# Patient Record
Sex: MALE | Race: WHITE | NOT HISPANIC OR LATINO | Employment: OTHER | URBAN - METROPOLITAN AREA
[De-identification: names, ages, dates, MRNs, and addresses within clinical notes are randomized per-mention and may not be internally consistent; named-entity substitution may affect disease eponyms.]

---

## 2017-01-04 ENCOUNTER — HOSPITAL ENCOUNTER (EMERGENCY)
Facility: HOSPITAL | Age: 82
Discharge: HOME | End: 2017-01-05
Attending: EMERGENCY MEDICINE | Admitting: EMERGENCY MEDICINE

## 2017-01-04 DIAGNOSIS — N39.0 ACUTE UTI: ICD-10-CM

## 2017-01-04 DIAGNOSIS — S22.080A T12 COMPRESSION FRACTURE, INITIAL ENCOUNTER (HCC): Primary | ICD-10-CM

## 2017-01-04 LAB
BACTERIA UR QL AUTO: ABNORMAL /HPF
BILIRUB UR QL STRIP: NEGATIVE
CLARITY UR: ABNORMAL
COLOR UR: YELLOW
GLUCOSE UR STRIP-MCNC: ABNORMAL MG/DL
HGB UR QL STRIP.AUTO: ABNORMAL
HYALINE CASTS UR QL AUTO: ABNORMAL /LPF
KETONES UR QL STRIP: NEGATIVE
LEUKOCYTE ESTERASE UR QL STRIP.AUTO: NEGATIVE
NITRITE UR QL STRIP: NEGATIVE
PH UR STRIP.AUTO: 6 [PH] (ref 4.5–8)
PROT UR QL STRIP: ABNORMAL
RBC # UR: ABNORMAL /HPF
REF LAB TEST METHOD: ABNORMAL
SP GR UR STRIP: 1.01 (ref 1–1.03)
SQUAMOUS #/AREA URNS HPF: ABNORMAL /HPF
UROBILINOGEN UR QL STRIP: ABNORMAL
WBC UR QL AUTO: ABNORMAL /HPF

## 2017-01-04 PROCEDURE — 99284 EMERGENCY DEPT VISIT MOD MDM: CPT | Performed by: EMERGENCY MEDICINE

## 2017-01-04 PROCEDURE — 99284 EMERGENCY DEPT VISIT MOD MDM: CPT

## 2017-01-04 PROCEDURE — 81001 URINALYSIS AUTO W/SCOPE: CPT | Performed by: EMERGENCY MEDICINE

## 2017-01-04 PROCEDURE — 87086 URINE CULTURE/COLONY COUNT: CPT | Performed by: EMERGENCY MEDICINE

## 2017-01-04 RX ORDER — BACLOFEN 10 MG/1
10 TABLET ORAL 3 TIMES DAILY
COMMUNITY

## 2017-01-04 RX ORDER — IPRATROPIUM BROMIDE AND ALBUTEROL SULFATE 2.5; .5 MG/3ML; MG/3ML
3 SOLUTION RESPIRATORY (INHALATION) EVERY 4 HOURS PRN
COMMUNITY
End: 2017-02-10 | Stop reason: HOSPADM

## 2017-01-04 RX ORDER — BUMETANIDE 1 MG/1
1 TABLET ORAL 2 TIMES DAILY
COMMUNITY

## 2017-01-04 RX ORDER — ISOSORBIDE DINITRATE 10 MG/1
10 TABLET ORAL 3 TIMES DAILY
COMMUNITY

## 2017-01-04 RX ORDER — POTASSIUM CHLORIDE 20MEQ/15ML
20 LIQUID (ML) ORAL DAILY
COMMUNITY

## 2017-01-04 RX ORDER — FERROUS SULFATE 220 (44)/5
325.6 ELIXIR ORAL DAILY
COMMUNITY

## 2017-01-04 RX ORDER — FAMOTIDINE 20 MG/1
20 TABLET, FILM COATED ORAL 2 TIMES DAILY
COMMUNITY

## 2017-01-04 RX ORDER — MONTELUKAST SODIUM 10 MG/1
10 TABLET ORAL NIGHTLY
COMMUNITY
End: 2017-01-26 | Stop reason: HOSPADM

## 2017-01-04 RX ORDER — SENNOSIDES 8.8 MG/5ML
5 LIQUID ORAL NIGHTLY
COMMUNITY

## 2017-01-04 RX ORDER — FLUTICASONE PROPIONATE 50 MCG
2 SPRAY, SUSPENSION (ML) NASAL DAILY
COMMUNITY

## 2017-01-04 RX ORDER — SODIUM CHLORIDE 1000 MG
2 TABLET, SOLUBLE MISCELLANEOUS 2 TIMES DAILY
COMMUNITY
End: 2017-01-26 | Stop reason: HOSPADM

## 2017-01-04 RX ORDER — SIMVASTATIN 10 MG
10 TABLET ORAL NIGHTLY
COMMUNITY
End: 2017-02-10 | Stop reason: HOSPADM

## 2017-01-04 RX ORDER — ERGOCALCIFEROL 1.25 MG/1
50000 CAPSULE ORAL
COMMUNITY

## 2017-01-04 RX ORDER — BUDESONIDE 0.5 MG/2ML
0.5 INHALANT ORAL 2 TIMES DAILY
COMMUNITY

## 2017-01-05 ENCOUNTER — APPOINTMENT (OUTPATIENT)
Dept: CT IMAGING | Facility: HOSPITAL | Age: 82
End: 2017-01-05

## 2017-01-05 VITALS
RESPIRATION RATE: 20 BRPM | WEIGHT: 171 LBS | HEIGHT: 73 IN | HEART RATE: 96 BPM | SYSTOLIC BLOOD PRESSURE: 142 MMHG | DIASTOLIC BLOOD PRESSURE: 83 MMHG | OXYGEN SATURATION: 93 % | TEMPERATURE: 97.9 F | BODY MASS INDEX: 22.66 KG/M2

## 2017-01-05 PROCEDURE — 74176 CT ABD & PELVIS W/O CONTRAST: CPT

## 2017-01-05 RX ORDER — CEFUROXIME AXETIL 250 MG/1
250 TABLET ORAL ONCE
Status: COMPLETED | OUTPATIENT
Start: 2017-01-05 | End: 2017-01-05

## 2017-01-05 RX ORDER — CEFUROXIME AXETIL 250 MG/1
250 TABLET ORAL 2 TIMES DAILY
Qty: 10 TABLET | Refills: 0 | Status: SHIPPED | OUTPATIENT
Start: 2017-01-05 | End: 2017-01-26 | Stop reason: HOSPADM

## 2017-01-05 RX ADMIN — CEFUROXIME AXETIL 250 MG: 250 TABLET ORAL at 01:39

## 2017-01-05 NOTE — ED NOTES
Reva from [facility] called. Patient fell 4 days ago and was complaining of flank pain. He had an xray this morning which showed a compression fracture of T-12. They were unsure if it was new or old and wanted patient evaluated. Also reported incontinence x 4 days     Gill Rai RN  01/04/17 2054

## 2017-01-05 NOTE — DISCHARGE INSTRUCTIONS
Follow-up with Dr. Amos this week for the urinary tract infection and evaluation for pain control for the vertebral fracture.  Follow-up with Dr. Alicia within one week for the vertebral fracture.

## 2017-01-05 NOTE — ED PROVIDER NOTES
Subjective   History of Present Illness  History of Present Illness    Chief complaint: Fall    Location: Nursing home    Quality/Severity:  Back pain, unable to quantify the level of pain    Timing/Onset/Duration: Acute onset 4 days ago    Modifying Factors: It hurts to move, feels better to remain still    Associated Symptoms: There's been no change in bladder or bowel function    Narrative: This 81-year-old white male fell 4 days ago and had an x-ray done to nursing home today that showed a T12 compression fracture of unknown chronicity.  The patiehnt was seen here for evaluation for this.  The patient apparently has also been complaining of left flank pain.  The patient has no other complaints.    PCP:  Bette      Review of Systems   Reason unable to perform ROS: Patient nonverbal.        Medication List      START taking these medications          cefuroxime 250 MG tablet   Commonly known as:  CEFTIN   Take 1 tablet by mouth 2 (Two) Times a Day.         CONTINUE taking these medications          ACETAMINOPHEN-CODEINE #3 PO       baclofen 10 MG tablet   Commonly known as:  LIORESAL       budesonide 0.5 MG/2ML nebulizer solution   Commonly known as:  PULMICORT       bumetanide 1 MG tablet   Commonly known as:  BUMEX       enoxaparin 80 MG/0.8ML solution syringe   Commonly known as:  LOVENOX       famotidine 20 MG tablet   Commonly known as:  PEPCID       ferrous sulfate 220 (44 FE) MG/5ML solution       FLONASE 50 MCG/ACT nasal spray   Generic drug:  fluticasone       Insulin Glargine 100 UNIT/ML injection pen   Commonly known as:  LANTUS SOLOSTAR       ipratropium-albuterol 0.5-2.5 mg/mL nebulizer   Commonly known as:  DUO-NEB       isosorbide dinitrate 10 MG tablet   Commonly known as:  ISORDIL       montelukast 10 MG tablet   Commonly known as:  SINGULAIR       potassium chloride 20 MEQ/15ML (10%) solution   Commonly known as:  KAYCIEL       Senna 8.8 MG/5ML syrup       simvastatin 10 MG tablet   Commonly known  as:  ZOCOR       SITagliptin 100 MG tablet   Commonly known as:  JANUVIA       sodium chloride 1 G tablet       vitamin D 07025 UNITS capsule capsule   Commonly known as:  ERGOCALCIFEROL           Past Medical History   Diagnosis Date   • Anemia    • Arthritis    • CAD (coronary artery disease)    • CHF (congestive heart failure)    • Chronic pain    • Constipation    • COPD (chronic obstructive pulmonary disease)    • Diabetes mellitus    • 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 No pertinent past surgical history.    History reviewed. No pertinent family history.    Social History     Social History   • Marital status:      Spouse name: N/A   • Number of children: N/A   • Years of education: N/A     Social History Main Topics   • Smoking status: Former Smoker   • Smokeless tobacco: None   • Alcohol use No   • Drug use: No   • Sexual activity: Defer     Other Topics Concern   • None     Social History Narrative   • None           Objective   Physical Exam   Constitutional: He appears well-developed and well-nourished. No distress.   ED Triage Vitals:  Temp: 97.9 °F (36.6 °C) (01/04/17 2104)  Heart Rate: 94 (01/04/17 2059)  Resp: 20 (01/04/17 2059)  BP: 129/89 (01/04/17 2059)  SpO2: 96 % (01/04/17 2059)  Temp src: Oral (01/04/17 2104)  Heart Rate Source: Monitor (01/04/17 2059)  Patient Position: Lying (01/04/17 2059)  BP Location: Right arm (01/04/17 2059)  FiO2 (%): n/a    The patient's vitals were reviewed by me.  Unless otherwise noted they are within normal limits.     HENT:   Head: Normocephalic and atraumatic.   Right Ear: External ear normal.   Left Ear: External  ear normal.   Nose: Nose normal.   Mouth/Throat: Oropharynx is clear and moist.   Eyes: Conjunctivae and EOM are normal. Pupils are equal, round, and reactive to light. Right eye exhibits no discharge. Left eye exhibits no discharge. No scleral icterus.   Neck: Normal range of motion. Neck supple. No JVD present. No tracheal deviation present. No thyromegaly present.   There is no tenderness or deformity upon palpation of the cervical, thoracic, lumbar sacral or coccygeal spine.  No bony step-offs.   Cardiovascular: Normal rate, regular rhythm, normal heart sounds and intact distal pulses.  Exam reveals no gallop and no friction rub.    No murmur heard.  Pulmonary/Chest: Effort normal and breath sounds normal. No stridor. No respiratory distress. He has no wheezes. He has no rales. He exhibits no tenderness.   Abdominal: Soft. Bowel sounds are normal. He exhibits no distension and no mass. There is no tenderness. There is no rebound and no guarding. No hernia.   Musculoskeletal: Normal range of motion. He exhibits no edema or deformity.   Lymphadenopathy:     He has no cervical adenopathy.   Neurological: He exhibits normal muscle tone.   Patient is sleeping but arousable.  He is nonverbal.   Skin: Skin is warm and dry. No rash noted. He is not diaphoretic. No erythema. No pallor.   Psychiatric: His behavior is normal.   Nursing note and vitals reviewed.      Procedures         ED Course  ED Course   Comment By Time   The laboratory values were reviewed by me.  There is 13-20 RBCs.  There is 13-20 daily BCs.  There is trace bacteria.  There is 3-6 squamous epithelial cells.  The urine has greater than 1000 glucose.  The laboratory values are otherwise unremarkable. Melvin Hawkins MD 01/05 0107      1:47 AM, 01/05/17:  I discussed this case with Dr. Chen, on call for neurosurgery at [facility].  The plan will be to send the patient back to the nursing home with management of his pain by Dr. Amos.   The patient is to have a follow-up appointment with Dr. Mcmahan within one week.  I will prescribe the patient an antibiotic for his urinary tract infection.  The patient has Tylenol 3 and baclofen artery prescribed for pain.    1:47 AM, 01/05/17:  The patient was reassessed.  His vital signs are stable.  Neurological exam: Unchanged.  Abdominal exam: Soft nontender no masses positive bowel sounds.            MDM    Final diagnoses:   T12 compression fracture, initial encounter   Acute UTI     CT Abdomen Pelvis Without Contrast   ED Interpretation   The CT the abdomen and pelvis without contrast indication acute   severe endplate fracture of the T12 vertebral body with minimal   compression.  There is a distended urinary bladder and enlarged   prostate, correlate for a lipid obstruction.  This was read by   Dr. Barrios.        Labs Reviewed   URINALYSIS W/ CULTURE IF INDICATED - Abnormal; Notable for the following:        Result Value    Appearance, UA Hazy (*)     Glucose, UA >=1000 mg/dL (3+) (*)     Blood, UA Trace (*)     Protein, UA Trace (*)     All other components within normal limits   URINALYSIS, MICROSCOPIC ONLY - Abnormal; Notable for the following:     RBC, UA 13-20 (*)     WBC, UA 13-20 (*)     Bacteria, UA Trace (*)     Squamous Epithelial Cells, UA 3-6 (*)     All other components within normal limits   URINE CULTURE     No results found.    Final diagnoses:   T12 compression fracture, initial encounter   Acute UTI         ED Medications:  Medications   cefuroxime (CEFTIN) tablet 250 mg (250 mg Oral Given 1/5/17 0139)       New Medications:     Medication List      START taking these medications          cefuroxime 250 MG tablet   Commonly known as:  CEFTIN   Take 1 tablet by mouth 2 (Two) Times a Day.         CONTINUE taking these medications          ACETAMINOPHEN-CODEINE #3 PO       baclofen 10 MG tablet   Commonly known as:  LIORESAL       budesonide 0.5 MG/2ML nebulizer solution   Commonly known  as:  PULMICORT       bumetanide 1 MG tablet   Commonly known as:  BUMEX       enoxaparin 80 MG/0.8ML solution syringe   Commonly known as:  LOVENOX       famotidine 20 MG tablet   Commonly known as:  PEPCID       ferrous sulfate 220 (44 FE) MG/5ML solution       FLONASE 50 MCG/ACT nasal spray   Generic drug:  fluticasone       Insulin Glargine 100 UNIT/ML injection pen   Commonly known as:  LANTUS SOLOSTAR       ipratropium-albuterol 0.5-2.5 mg/mL nebulizer   Commonly known as:  DUO-NEB       isosorbide dinitrate 10 MG tablet   Commonly known as:  ISORDIL       montelukast 10 MG tablet   Commonly known as:  SINGULAIR       potassium chloride 20 MEQ/15ML (10%) solution   Commonly known as:  KAYCIEL       Senna 8.8 MG/5ML syrup       simvastatin 10 MG tablet   Commonly known as:  ZOCOR       SITagliptin 100 MG tablet   Commonly known as:  JANUVIA       sodium chloride 1 G tablet       vitamin D 72502 UNITS capsule capsule   Commonly known as:  ERGOCALCIFEROL           Stopped Medications:     Medication List      START taking these medications          cefuroxime 250 MG tablet   Commonly known as:  CEFTIN   Take 1 tablet by mouth 2 (Two) Times a Day.         CONTINUE taking these medications          ACETAMINOPHEN-CODEINE #3 PO       baclofen 10 MG tablet   Commonly known as:  LIORESAL       budesonide 0.5 MG/2ML nebulizer solution   Commonly known as:  PULMICORT       bumetanide 1 MG tablet   Commonly known as:  BUMEX       enoxaparin 80 MG/0.8ML solution syringe   Commonly known as:  LOVENOX       famotidine 20 MG tablet   Commonly known as:  PEPCID       ferrous sulfate 220 (44 FE) MG/5ML solution       FLONASE 50 MCG/ACT nasal spray   Generic drug:  fluticasone       Insulin Glargine 100 UNIT/ML injection pen   Commonly known as:  LANTUS SOLOSTAR       ipratropium-albuterol 0.5-2.5 mg/mL nebulizer   Commonly known as:  DUO-NEB       isosorbide dinitrate 10 MG tablet   Commonly known as:  ISORDIL       montelukast  10 MG tablet   Commonly known as:  SINGULAIR       potassium chloride 20 MEQ/15ML (10%) solution   Commonly known as:  KAYCIEL       Senna 8.8 MG/5ML syrup       simvastatin 10 MG tablet   Commonly known as:  ZOCOR       SITagliptin 100 MG tablet   Commonly known as:  JANUVIA       sodium chloride 1 G tablet       vitamin D 59580 UNITS capsule capsule   Commonly known as:  ERGOCALCIFEROL                  Melvin Hawkins MD  01/05/17 0128       Melvin Hawkins MD  01/05/17 0151

## 2017-01-06 LAB — BACTERIA SPEC AEROBE CULT: NO GROWTH

## 2017-01-21 ENCOUNTER — APPOINTMENT (OUTPATIENT)
Dept: GENERAL RADIOLOGY | Facility: HOSPITAL | Age: 82
End: 2017-01-21

## 2017-01-21 ENCOUNTER — HOSPITAL ENCOUNTER (INPATIENT)
Facility: HOSPITAL | Age: 82
LOS: 4 days | Discharge: NURSING, REHAB OR LONG-TERM CARE | End: 2017-01-26
Attending: EMERGENCY MEDICINE | Admitting: INTERNAL MEDICINE

## 2017-01-21 ENCOUNTER — APPOINTMENT (OUTPATIENT)
Dept: CT IMAGING | Facility: HOSPITAL | Age: 82
End: 2017-01-21

## 2017-01-21 DIAGNOSIS — R13.12 OROPHARYNGEAL DYSPHAGIA: ICD-10-CM

## 2017-01-21 DIAGNOSIS — J18.9 PNEUMONIA OF RIGHT LOWER LOBE DUE TO INFECTIOUS ORGANISM: Primary | ICD-10-CM

## 2017-01-21 DIAGNOSIS — K56.41 FECAL IMPACTION (HCC): ICD-10-CM

## 2017-01-21 DIAGNOSIS — R33.9 URINARY RETENTION: ICD-10-CM

## 2017-01-21 DIAGNOSIS — A41.9 SEPSIS, DUE TO UNSPECIFIED ORGANISM: ICD-10-CM

## 2017-01-21 DIAGNOSIS — N40.0 PROSTATIC HYPERTROPHY: ICD-10-CM

## 2017-01-21 DIAGNOSIS — R73.9 HYPERGLYCEMIA: ICD-10-CM

## 2017-01-21 LAB
ALBUMIN SERPL-MCNC: 3.7 G/DL (ref 3.5–5.2)
ALBUMIN/GLOB SERPL: 0.8 G/DL
ALP SERPL-CCNC: 181 U/L (ref 40–129)
ALT SERPL W P-5'-P-CCNC: 12 U/L (ref 5–41)
ANION GAP SERPL CALCULATED.3IONS-SCNC: 17.2 MMOL/L
AST SERPL-CCNC: 13 U/L (ref 5–40)
BACTERIA UR QL AUTO: ABNORMAL /HPF
BASOPHILS # BLD AUTO: 0.05 10*3/MM3 (ref 0–0.2)
BASOPHILS NFR BLD AUTO: 0.3 % (ref 0–2)
BILIRUB SERPL-MCNC: 0.5 MG/DL (ref 0.2–1.2)
BILIRUB UR QL STRIP: NEGATIVE
BUN BLD-MCNC: 25 MG/DL (ref 8–23)
BUN/CREAT SERPL: 20.2 (ref 7–25)
CALCIUM SPEC-SCNC: 9.8 MG/DL (ref 8.8–10.5)
CHLORIDE SERPL-SCNC: 104 MMOL/L (ref 98–107)
CLARITY UR: CLEAR
CO2 SERPL-SCNC: 25.8 MMOL/L (ref 22–29)
COLOR UR: YELLOW
CREAT BLD-MCNC: 1.24 MG/DL (ref 0.76–1.27)
D-LACTATE SERPL-SCNC: 2.2 MMOL/L (ref 0.5–2)
DEPRECATED RDW RBC AUTO: 43.2 FL (ref 37–54)
EOSINOPHIL # BLD AUTO: 0 10*3/MM3 (ref 0.1–0.3)
EOSINOPHIL NFR BLD AUTO: 0 % (ref 0–4)
ERYTHROCYTE [DISTWIDTH] IN BLOOD BY AUTOMATED COUNT: 13.2 % (ref 11.5–14.5)
FLUAV AG NPH QL: NEGATIVE
FLUBV AG NPH QL IA: NEGATIVE
GFR SERPL CREATININE-BSD FRML MDRD: 56 ML/MIN/1.73
GLOBULIN UR ELPH-MCNC: 4.5 GM/DL
GLUCOSE BLD-MCNC: 564 MG/DL (ref 65–99)
GLUCOSE BLDC GLUCOMTR-MCNC: 303 MG/DL (ref 70–130)
GLUCOSE UR STRIP-MCNC: ABNORMAL MG/DL
HCT VFR BLD AUTO: 48.7 % (ref 42–52)
HGB BLD-MCNC: 15.9 G/DL (ref 14–18)
HGB UR QL STRIP.AUTO: ABNORMAL
HIV1 P24 AG SER QL: NORMAL
HIV1+2 AB SER QL: NEGATIVE
HYALINE CASTS UR QL AUTO: ABNORMAL /LPF
IMM GRANULOCYTES # BLD: 0.08 10*3/MM3 (ref 0–0.03)
IMM GRANULOCYTES NFR BLD: 0.5 % (ref 0–0.5)
KETONES UR QL STRIP: NEGATIVE
LEUKOCYTE ESTERASE UR QL STRIP.AUTO: NEGATIVE
LYMPHOCYTES # BLD AUTO: 0.86 10*3/MM3 (ref 0.6–4.8)
LYMPHOCYTES NFR BLD AUTO: 5.1 % (ref 20–45)
MCH RBC QN AUTO: 29.1 PG (ref 27–31)
MCHC RBC AUTO-ENTMCNC: 32.6 G/DL (ref 31–37)
MCV RBC AUTO: 89 FL (ref 80–94)
MONOCYTES # BLD AUTO: 0.91 10*3/MM3 (ref 0–1)
MONOCYTES NFR BLD AUTO: 5.4 % (ref 3–8)
NEUTROPHILS # BLD AUTO: 15.1 10*3/MM3 (ref 1.5–8.3)
NEUTROPHILS NFR BLD AUTO: 88.7 % (ref 45–70)
NITRITE UR QL STRIP: NEGATIVE
NRBC BLD MANUAL-RTO: 0 /100 WBC (ref 0–0)
PH UR STRIP.AUTO: 6 [PH] (ref 4.5–8)
PLATELET # BLD AUTO: 344 10*3/MM3 (ref 140–500)
PMV BLD AUTO: 11.6 FL (ref 7.4–10.4)
POTASSIUM BLD-SCNC: 4.4 MMOL/L (ref 3.5–5.2)
PROT SERPL-MCNC: 8.2 G/DL (ref 6–8.5)
PROT UR QL STRIP: NEGATIVE
RBC # BLD AUTO: 5.47 10*6/MM3 (ref 4.7–6.1)
RBC # UR: ABNORMAL /HPF
REF LAB TEST METHOD: ABNORMAL
SODIUM BLD-SCNC: 147 MMOL/L (ref 136–145)
SP GR UR STRIP: 1.01 (ref 1–1.03)
SQUAMOUS #/AREA URNS HPF: ABNORMAL /HPF
UROBILINOGEN UR QL STRIP: ABNORMAL
WBC NRBC COR # BLD: 17 10*3/MM3 (ref 4.8–10.8)
WBC UR QL AUTO: ABNORMAL /HPF

## 2017-01-21 PROCEDURE — 96365 THER/PROPH/DIAG IV INF INIT: CPT

## 2017-01-21 PROCEDURE — 51702 INSERT TEMP BLADDER CATH: CPT

## 2017-01-21 PROCEDURE — 87804 INFLUENZA ASSAY W/OPTIC: CPT | Performed by: EMERGENCY MEDICINE

## 2017-01-21 PROCEDURE — 96375 TX/PRO/DX INJ NEW DRUG ADDON: CPT

## 2017-01-21 PROCEDURE — 85025 COMPLETE CBC W/AUTO DIFF WBC: CPT | Performed by: EMERGENCY MEDICINE

## 2017-01-21 PROCEDURE — 25010000002 AZITHROMYCIN PER 500 MG: Performed by: EMERGENCY MEDICINE

## 2017-01-21 PROCEDURE — 87340 HEPATITIS B SURFACE AG IA: CPT | Performed by: EMERGENCY MEDICINE

## 2017-01-21 PROCEDURE — 71010 HC CHEST PA OR AP: CPT

## 2017-01-21 PROCEDURE — 82962 GLUCOSE BLOOD TEST: CPT

## 2017-01-21 PROCEDURE — 96368 THER/DIAG CONCURRENT INF: CPT

## 2017-01-21 PROCEDURE — 80053 COMPREHEN METABOLIC PANEL: CPT | Performed by: EMERGENCY MEDICINE

## 2017-01-21 PROCEDURE — 87040 BLOOD CULTURE FOR BACTERIA: CPT | Performed by: EMERGENCY MEDICINE

## 2017-01-21 PROCEDURE — 0 IOPAMIDOL 61 % SOLUTION: Performed by: EMERGENCY MEDICINE

## 2017-01-21 PROCEDURE — 96361 HYDRATE IV INFUSION ADD-ON: CPT

## 2017-01-21 PROCEDURE — 25010000002 CEFTRIAXONE PER 250 MG: Performed by: EMERGENCY MEDICINE

## 2017-01-21 PROCEDURE — 74177 CT ABD & PELVIS W/CONTRAST: CPT

## 2017-01-21 PROCEDURE — G0432 EIA HIV-1/HIV-2 SCREEN: HCPCS | Performed by: EMERGENCY MEDICINE

## 2017-01-21 PROCEDURE — 87899 AGENT NOS ASSAY W/OPTIC: CPT | Performed by: EMERGENCY MEDICINE

## 2017-01-21 PROCEDURE — 81001 URINALYSIS AUTO W/SCOPE: CPT | Performed by: EMERGENCY MEDICINE

## 2017-01-21 PROCEDURE — 93005 ELECTROCARDIOGRAM TRACING: CPT | Performed by: EMERGENCY MEDICINE

## 2017-01-21 PROCEDURE — 63710000001 INSULIN REGULAR HUMAN PER 5 UNITS: Performed by: EMERGENCY MEDICINE

## 2017-01-21 PROCEDURE — 83605 ASSAY OF LACTIC ACID: CPT | Performed by: EMERGENCY MEDICINE

## 2017-01-21 PROCEDURE — 99284 EMERGENCY DEPT VISIT MOD MDM: CPT | Performed by: EMERGENCY MEDICINE

## 2017-01-21 PROCEDURE — 93010 ELECTROCARDIOGRAM REPORT: CPT | Performed by: INTERNAL MEDICINE

## 2017-01-21 PROCEDURE — 99285 EMERGENCY DEPT VISIT HI MDM: CPT

## 2017-01-21 RX ORDER — SODIUM CHLORIDE 0.9 % (FLUSH) 0.9 %
10 SYRINGE (ML) INJECTION AS NEEDED
Status: DISCONTINUED | OUTPATIENT
Start: 2017-01-21 | End: 2017-01-26 | Stop reason: HOSPADM

## 2017-01-21 RX ORDER — GUAIFENESIN 600 MG/1
600 TABLET, EXTENDED RELEASE ORAL 2 TIMES DAILY
Status: ON HOLD | COMMUNITY
Start: 2017-01-14 | End: 2017-01-26

## 2017-01-21 RX ORDER — SENNOSIDES 8.6 MG
TABLET ORAL NIGHTLY
COMMUNITY
End: 2017-01-26 | Stop reason: HOSPADM

## 2017-01-21 RX ORDER — AZITHROMYCIN 500 MG/1
INJECTION, POWDER, LYOPHILIZED, FOR SOLUTION INTRAVENOUS
Status: DISPENSED
Start: 2017-01-21 | End: 2017-01-22

## 2017-01-21 RX ORDER — DOXYCYCLINE HYCLATE 100 MG/1
100 CAPSULE ORAL 2 TIMES DAILY
COMMUNITY
Start: 2017-01-14 | End: 2017-01-26 | Stop reason: HOSPADM

## 2017-01-21 RX ADMIN — IOPAMIDOL 100 ML: 612 INJECTION, SOLUTION INTRAVENOUS at 23:04

## 2017-01-21 RX ADMIN — SODIUM CHLORIDE 500 MG: 900 INJECTION, SOLUTION INTRAVENOUS at 23:10

## 2017-01-21 RX ADMIN — CEFTRIAXONE 1 G: 1 INJECTION, SOLUTION INTRAVENOUS at 22:25

## 2017-01-21 RX ADMIN — SODIUM CHLORIDE 1000 ML: 900 INJECTION, SOLUTION INTRAVENOUS at 21:21

## 2017-01-21 RX ADMIN — HUMAN INSULIN 10 UNITS: 100 INJECTION, SOLUTION SUBCUTANEOUS at 21:40

## 2017-01-21 NOTE — IP AVS SNAPSHOT
AFTER VISIT SUMMARY             Chance Bocanegra           About your hospitalization     You were admitted on:  January 22, 2017 You last received care in the:  [facility] SURG       Procedures & Surgeries         Medications    If you or your caregiver advised us that you are currently taking a medication and that medication is marked below as “Resume”, this simply indicates that we have reviewed those medications to make sure our new therapy recommendations do not interfere.  If you have concerns about medications other than those new ones which we are prescribing today, please consult the physician who prescribed them (or your primary physician).  Our review of your home medications is not meant to indicate that we are directing their use.             Your Medications      START taking these medications      MG capsule   Take 100 mg by mouth 2 (Two) Times a Day.   Last time this was given:  1/26/2017  8:43 AM   Next Dose Due:  1/26/17 @ 6:00pm           insulin aspart 100 UNIT/ML injection   Inject 0-7 Units under the skin 4 (Four) Times a Day Before Meals & at Bedtime.   Last time this was given:  1/25/2017  5:30 PM   Commonly known as:  novoLOG           levoFLOXacin 500 MG tablet   Take 1 tablet by mouth Daily for 6 doses. Indications: Pneumonia, Infection of Blood or Tissues affecting the Whole Body   Last time this was given:  1/26/2017  4:30 AM   Commonly known as:  LEVAQUIN   Next Dose Due:  1/27/16 @ 9:00 am           linezolid 600 MG tablet   Take 1 tablet by mouth Every 12 (Twelve) Hours for 6 days. Indications: Pneumonia, Infection of Blood or Tissues affecting the Whole Body   Last time this was given:  1/26/2017 10:30 AM   Commonly known as:  ZYVOX   Next Dose Due:  1/26/17 @ 9:00 pm           nitroglycerin 0.4 MG SL tablet   Place 1 tablet under the tongue Every 5 (Five) Minutes As Needed for chest pain. Take no more than 3 doses in 15 minutes.   Commonly known as:   NITROSTAT             CHANGE how you take these medications     Senna 8.8 MG/5ML syrup   Take 5 mL by mouth Every Night.   What changed:  Another medication with the same name was removed. Continue taking this medication, and follow the directions you see here.   Next Dose Due:  1/26/17 @ 6:00 pm             CONTINUE taking these medications     ACETAMINOPHEN-CODEINE #3 PO   Take 1 tablet by mouth 3 (Three) Times a Day.   Last time this was given:  1/22/2017  8:06 PM   Next Dose Due:  1/26/17 @ bedtime           baclofen 10 MG tablet   Take 10 mg by mouth 3 (Three) Times a Day.   Last time this was given:  1/26/2017  8:43 AM   Commonly known as:  LIORESAL   Next Dose Due:  1/26/17 @ 2:00 pm           budesonide 0.5 MG/2ML nebulizer solution   Take 0.5 mg by nebulization 2 (Two) Times a Day.   Last time this was given:  1/26/2017  8:02 AM   Commonly known as:  PULMICORT   Next Dose Due:  1/26/17 @ 6:00 pm           bumetanide 1 MG tablet   Take 1 mg by mouth 2 (Two) Times a Day. Patient takes 2 mg in the morning at 0800; 1 mg in the afternoon at 1400   Commonly known as:  BUMEX           enoxaparin 80 MG/0.8ML solution syringe   Inject 80 mg under the skin Every 12 (Twelve) Hours.   Last time this was given:  1/26/2017  8:43 AM   Commonly known as:  LOVENOX   Next Dose Due:  1/26/17 @ 9:00 pm           famotidine 20 MG tablet   Take 20 mg by mouth 2 (Two) Times a Day.   Last time this was given:  1/26/2017  8:43 AM   Commonly known as:  PEPCID   Next Dose Due:  1/26/17 prior to dinner           ferrous sulfate 220 (44 FE) MG/5ML solution   Take 325.6 mg by mouth Daily.   Next Dose Due:  1/26/17 @ 6:00 pm           FLONASE 50 MCG/ACT nasal spray   2 sprays into each nostril Daily.   Last time this was given:  1/26/2017  8:43 AM   Generic drug:  fluticasone   Next Dose Due:  1/26/17 @ 9:00 am           guaiFENesin 600 MG 12 hr tablet   Take 1 tablet by mouth 2 (Two) Times a Day for 8 days.   Last time this was given:   1/26/2017  8:43 AM   Commonly known as:  MUCINEX   Next Dose Due:  1/26/17 @ 9:00 pm           HUMALOG SC   Inject 3 Units under the skin 3 (Three) Times a Day With Meals. Hold if blood sugar is below 100   Next Dose Due:  1/26/17 @ 6:00 pm           Insulin Glargine 100 UNIT/ML injection pen   Inject 44 Units under the skin Daily.   Commonly known as:  LANTUS SOLOSTAR   Next Dose Due:  1/27/16 @ 9:00 am           ipratropium-albuterol 0.5-2.5 mg/mL nebulizer   Take 3 mL by nebulization Every 4 (Four) Hours As Needed for wheezing.   Last time this was given:  1/24/2017 10:48 AM   Commonly known as:  DUO-NEB           isosorbide dinitrate 10 MG tablet   Take 10 mg by mouth 3 (Three) Times a Day.   Last time this was given:  1/26/2017  8:43 AM   Commonly known as:  ISORDIL           potassium chloride 20 MEQ/15ML (10%) solution   Take 20 mEq by mouth Daily.   Last time this was given:  1/23/2017 11:37 PM   Commonly known as:  KAYCIEL   Next Dose Due:  1/26/17 @ 6:00 pm           PROBIOTIC DAILY PO   Take 1 tablet by mouth 2 (Two) Times a Day.   Next Dose Due:  1/26/17 @ 6:00 pm           simvastatin 10 MG tablet   Take 10 mg by mouth Every Night.   Commonly known as:  ZOCOR   Next Dose Due:  1/26/17 @ 9:00 pm           SITagliptin 100 MG tablet   Take 100 mg by mouth Daily.   Last time this was given:  1/26/2017  8:43 AM   Commonly known as:  JANUVIA   Next Dose Due:  1/26/17 @ 9:00 am           vitamin D 02672 UNITS capsule capsule   Take 50,000 Units by mouth Every 7 (Seven) Days.   Commonly known as:  ERGOCALCIFEROL   Notes to Patient:  Take every 7 days             STOP taking these medications     cefuroxime 250 MG tablet   Commonly known as:  CEFTIN           doxycycline 100 MG capsule   Commonly known as:  VIBRAMYCIN           montelukast 10 MG tablet   Commonly known as:  SINGULAIR           sodium chloride 1 G tablet                Where to Get Your Medications      These medications were sent to [facility]  Pharmacy - [facility], [place] - [address] - [phone]  - [phone] FX  [address], [place] [address]     Phone:  [phone]     guaiFENesin 600 MG 12 hr tablet         Information about where to get these medications is not yet available     ! Ask your nurse or doctor about these medications      MG capsule    insulin aspart 100 UNIT/ML injection    levoFLOXacin 500 MG tablet    linezolid 600 MG tablet    nitroglycerin 0.4 MG SL tablet                  Your Medications      Your Medication List           Morning Noon Evening Bedtime As Needed    ACETAMINOPHEN-CODEINE #3 PO   Take 1 tablet by mouth 3 (Three) Times a Day.                                         baclofen 10 MG tablet   Take 10 mg by mouth 3 (Three) Times a Day.   Commonly known as:  LIORESAL                                         budesonide 0.5 MG/2ML nebulizer solution   Take 0.5 mg by nebulization 2 (Two) Times a Day.   Commonly known as:  PULMICORT                                      bumetanide 1 MG tablet   Take 1 mg by mouth 2 (Two) Times a Day. Patient takes 2 mg in the morning at 0800; 1 mg in the afternoon at 1400   Commonly known as:  BUMEX                                    MG capsule   Take 100 mg by mouth 2 (Two) Times a Day.                                      enoxaparin 80 MG/0.8ML solution syringe   Inject 80 mg under the skin Every 12 (Twelve) Hours.   Commonly known as:  LOVENOX                                   famotidine 20 MG tablet   Take 20 mg by mouth 2 (Two) Times a Day.   Commonly known as:  PEPCID                                      ferrous sulfate 220 (44 FE) MG/5ML solution   Take 325.6 mg by mouth Daily.                                   FLONASE 50 MCG/ACT nasal spray   2 sprays into each nostril Daily.   Generic drug:  fluticasone                                   guaiFENesin 600 MG 12 hr tablet   Take 1 tablet by mouth 2 (Two) Times a Day for 8 days.   Commonly known as:   MUCINEX                                      HUMALOG SC   Inject 3 Units under the skin 3 (Three) Times a Day With Meals. Hold if blood sugar is below 100                                         insulin aspart 100 UNIT/ML injection   Inject 0-7 Units under the skin 4 (Four) Times a Day Before Meals & at Bedtime.   Commonly known as:  novoLOG                                   Insulin Glargine 100 UNIT/ML injection pen   Inject 44 Units under the skin Daily.   Commonly known as:  LANTUS SOLOSTAR                                   ipratropium-albuterol 0.5-2.5 mg/mL nebulizer   Take 3 mL by nebulization Every 4 (Four) Hours As Needed for wheezing.   Commonly known as:  DUO-NEB                                   isosorbide dinitrate 10 MG tablet   Take 10 mg by mouth 3 (Three) Times a Day.   Commonly known as:  ISORDIL                                         levoFLOXacin 500 MG tablet   Take 1 tablet by mouth Daily for 6 doses. Indications: Pneumonia, Infection of Blood or Tissues affecting the Whole Body   Commonly known as:  LEVAQUIN                                   linezolid 600 MG tablet   Take 1 tablet by mouth Every 12 (Twelve) Hours for 6 days. Indications: Pneumonia, Infection of Blood or Tissues affecting the Whole Body   Commonly known as:  ZYVOX                                      nitroglycerin 0.4 MG SL tablet   Place 1 tablet under the tongue Every 5 (Five) Minutes As Needed for chest pain. Take no more than 3 doses in 15 minutes.   Commonly known as:  NITROSTAT                                   potassium chloride 20 MEQ/15ML (10%) solution   Take 20 mEq by mouth Daily.   Commonly known as:  ESCOBAR                                         PROBIOTIC DAILY PO   Take 1 tablet by mouth 2 (Two) Times a Day.                                      Senna 8.8 MG/5ML syrup   Take 5 mL by mouth Every Night.                                   simvastatin 10 MG tablet   Take 10 mg by mouth Every Night.   Commonly known as:   ZOCOR                                   SITagliptin 100 MG tablet   Take 100 mg by mouth Daily.   Commonly known as:  JANUVIA                                   vitamin D 28626 UNITS capsule capsule   Take 50,000 Units by mouth Every 7 (Seven) Days.   Commonly known as:  ERGOCALCIFEROL   Notes to Patient:  Take every 7 days                                            Instructions for After Discharge        Activity Instructions     Activity as Tolerated       Recommend evaluation by your PT and OT on return to the nursing home.             Diet Instructions     Diet: Consistent Carbohydrate, Cardiac, Soft Texture; Nectar / Syrup Thick Liquids; Ground       Discharge Diet:   Consistent Carbohydrate  Cardiac  Soft Texture      Fluid Consistency:  Nectar / Syrup Thick Liquids   Soft Options:  Ground   Orders Placed This Encounter      Diet Soft Texture; Ground; Nectar / Syrup Thick; Cardiac, Consistent Carbohydrate             Discharge References/Attachments     ASPIRATION PRECAUTIONS (ENGLISH)    PNEUMONIA, ADULT, EASY-TO-READ (ENGLISH)    URINARY RETENTION, ACUTE, MALE, EASY-TO-READ (ENGLISH)       Follow-ups for After Discharge        Follow-up Information     Follow up with [facility] .    Specialties:  Skilled Nursing Facility, Intermediate Care Facility    Contact information:    [address] [facility] [address] [phone]        Follow up with Thomas Amos MD .    Specialty:  Internal Medicine    Contact information:    [address] [facility] [address] [phone]        Referrals and Follow-ups to Schedule     Call MD With Problems / Concerns    As directed    Instructions:  Be sure Dr. Amos knows patient is returning to the nursing home.       Follow-Up    As directed    Dr. Amos   Follow Up Details:  To be admitted to Dr. Amos's service             MyChart Signup     Our records indicate that you have declined [facility] MyChart signup. If you would like to sign up for MyChart, please  email [email or web address] or call [phone] to obtain an activation code.         Summary of Your Hospitalization        Reason for Hospitalization     Your primary diagnosis was:  Not on File    Your diagnoses also included:  Pneumonia Of Right Lower Lobe Due To Infectious Organism      Care Providers     Provider Service Role Specialty    Edwin Rizo MD Internal Medicine Attending Provider Internal Medicine    Donato Alvarado MD -- Consulting Physician  Pulmonary Disease      Your Allergies  Date Reviewed: 1/22/2017    Allergen Reactions    Flomax (Tamsulosin Hcl) Not Noted         Penicillins Not Noted         Pravachol (Pravastatin) Not Noted         Prevacid (Lansoprazole) Not Noted      Pending Labs     Order Current Status    Blood Culture Preliminary result    Blood Culture Preliminary result      Patient Belongings Returned     Document Return of Belongings Flowsheet     Were the patient bedside belongings sent home?   Yes   Belongings Retrieved from Security & Sent Home   N/A    Belongings Sent to Safe   --   Medications Retrieved from Pharmacy & Sent Home   N/A              More Information      Aspiration Precautions  Aspiration is the breathing in (inhalation) of a liquid or object into the lungs. Things that can be inhaled into the lungs include:   · Food.  · Any type of liquid, such as drinks or saliva.  · Stomach contents, such as vomit or stomach acid.  When these things go into the lungs, damage can occur and serious complications can result, such as:  · Lung infection (pneumonia).  · Collection of infected liquid (pus) in the lungs (lung abscess).  · Death.  CAUSES  The cause of aspiration may include:   · A lowered level of awareness (consciousness) due to:    Traumatic brain injury or head injury.    Stroke.    Diseases of the nerves, brain, or spinal cord.    Seizures.  · A problem with the gag reflex. The gag reflex protects the body from swallowing things too quickly  or things that are too large.  · Medical conditions that affect swallowing.  · Conditions that affect the food pipe (esophagus).  · Acid reflux. This is when stomach acid moves into the esophagus.  · Any type of surgery where a medicine to sleep (general anesthetic) or relax (sedative) is given.  · Alcohol abuse.  · Illegal drug abuse.  · Taking medicine that causes sleepiness, confusion, or weakness.  · Aging.  · Dental problems.  · Having a feeding tube.  SIGNS AND SYMPTOMS  Symptoms of aspiration may include:   · Coughing after swallowing food or liquids.  · Difficulty breathing. This may include:    Breathing quickly.    Breathing very slowly.    Loud breathing.    Rumbling sounds from the lungs while breathing.  · Coughing up phlegm (sputum) that:    Is yellow, tan, or green.    Has pieces of food in it.    Is bad smelling.  · A change in voice so that it sounds scratchy.  · A change in skin color. The skin may look red or blue.     · Fever.  · Watery eyes.  · Pain in the chest or back.  · A pained look on the face.     · A feeling of fullness in the throat or that something is stuck in the throat.  DIAGNOSIS  Aspiration may be diagnosed by:   · Chest X-ray.  · Bronchoscopy. This is a surgical procedure in which a thin, flexible tube with a camera is inserted into the nose or mouth to the lungs. The health care provider can then view the lungs.  · A swallowing evaluation study to find out:    A person's risk of aspiration.    How difficult it is for a person to swallow.    What types of foods are safe for a person to eat.  PREVENTION  If you are caring for someone who can eat and drink through his or her mouth:   · Have the person sit in an upright position when eating food or drinking fluids, such as:    Sitting up in a chair.    If sitting in a chair is not possible, position the person in bed so he or she is upright.  · Remind the person to eat slowly and chew well.  · Do not distract the person. This is  especially important for people with thinking or memory (cognitive) problems.  · Check the person's mouth for leftover food after eating.  · Keep the person sitting upright for 30-45 minutes after eating.  · Do not serve food or drink for at least 2 hours before bedtime.  If you are caring for someone with a feeding tube who cannot eat or drink through his or her mouth:  · Keep the person in an upright position as much as possible.  · Do not  lay the person flat if he or she is getting continuous feedings. Turn the feeding pump off if you need to lay the person flat for any reason.  · Check feeding tube residuals as directed by your health care provider. Ask your health care provider what residual amount is too high.  General guidelines to prevent aspiration in someone you are caring for include:  · Feed small amounts of food. Do not force feed.  · Food should be thickened as directed by the person's speech pathologist.  · Use as little water as possible when brushing the person's teeth or cleaning his or her mouth.  · Provide oral care before and after meals.  · Never put food or liquids in the mouth of a person who is not fully alert.  · Crush pills and put them in soft food such as pudding or ice cream. Some pills should not be crushed. Check with your health care provider before crushing any medicine.  SEEK MEDICAL CARE IF:  · The person has a feeding tube and the feeding tube residual amount is too high.  · The person has a fever.  · The person tries to avoid food, such as refusing to eat or be fed, or is eating less than normal.  SEEK IMMEDIATE MEDICAL CARE IF:   · The person has trouble breathing or starts to breathe quickly.  · The person is breathing very slowly or stops breathing.  · The person coughs a lot after eating or drinking.  · The person has a long-lasting (chronic) cough.  · The person coughs up thick, yellow, or tan sputum.  MAKE SURE YOU:   · Understand these instructions.  · Will watch the  person's condition.  · Will get help right away if the person is not doing well or gets worse.     This information is not intended to replace advice given to you by your health care provider. Make sure you discuss any questions you have with your health care provider.     Document Released: 01/20/2012 Document Revised: 01/08/2016 Document Reviewed: 03/25/2015  [employer] Interactive Patient Education ©2016 [employer]          Community-Acquired Pneumonia, Adult  Pneumonia is an infection of the lungs. One type of pneumonia can happen while a person is in a hospital. A different type can happen when a person is not in a hospital (community-acquired pneumonia). It is easy for this kind to spread from person to person. It can spread to you if you breathe near an infected person who coughs or sneezes. Some symptoms include:  · A dry cough.  · A wet (productive) cough.  · Fever.  · Sweating.  · Chest pain.  HOME CARE  · Take over-the-counter and prescription medicines only as told by your doctor.    Only take cough medicine if you are losing sleep.    If you were prescribed an antibiotic medicine, take it as told by your doctor. Do not stop taking the antibiotic even if you start to feel better.  · Sleep with your head and neck raised (elevated). You can do this by putting a few pillows under your head, or you can sleep in a recliner.  · Do not use tobacco products. These include cigarettes, chewing tobacco, and e-cigarettes. If you need help quitting, ask your doctor.  · Drink enough water to keep your pee (urine) clear or pale yellow.  A shot (vaccine) can help prevent pneumonia. Shots are often suggested for:  · People older than 65 years of age.  · People older than 19 years of age:    Who are having cancer treatment.    Who have long-term (chronic) lung disease.    Who have problems with their body's defense system (immune system).  You may also prevent pneumonia if you take these actions:  · Get the flu  (influenza) shot every year.  · Go to the dentist as often as told.  · Wash your hands often. If soap and water are not available, use hand .  GET HELP IF:  · You have a fever.  · You lose sleep because your cough medicine does not help.  GET HELP RIGHT AWAY IF:  · You are short of breath and it gets worse.  · You have more chest pain.  · Your sickness gets worse. This is very serious if:    You are an older adult.    Your body's defense system is weak.  · You cough up blood.     This information is not intended to replace advice given to you by your health care provider. Make sure you discuss any questions you have with your health care provider.     Document Released: 06/05/2009 Document Revised: 09/07/2016 Document Reviewed: 04/13/2016  [employer] Interactive Patient Education ©2016 [employer]          Acute Urinary Retention, Male  Acute urinary retention is when you are unable to pee (urinate). Acute urinary retention is common in older men. Prostates can get bigger, which blocks the flow of pee.   HOME CARE  · Drink enough fluids to keep your pee clear or pale yellow.  · If you are sent home with a tube that drains the bladder (catheter), there will be a drainage bag attached to it. There are two types of bags. One is big that you can wear at night without having to empty it. One is smaller and needs to be emptied more often.    Keep the drainage bag empty.    Keep the drainage bag lower than your catheter.  · Only take medicine as told by your doctor.  GET HELP IF:  · You have a low-grade fever.  · You have spasms or you are leaking pee when you have spasms.  GET HELP RIGHT AWAY IF:   · You have chills or a fever.  · Your catheter stops draining pee.  · Your catheter falls out.  · You have increased bleeding that does not stop after you have rested and increased the amount of fluids you had been drinking.  MAKE SURE YOU:   · Understand these instructions.  · Will watch your condition.  · Will get  help right away if you are not doing well or get worse.     This information is not intended to replace advice given to you by your health care provider. Make sure you discuss any questions you have with your health care provider.     Document Released: 06/05/2009 Document Revised: 05/03/2016 Document Reviewed: 05/29/2014  [employer] Interactive Patient Education ©2016 [employer]            SYMPTOMS OF A STROKE    Call 911 or have someone take you to the Emergency Department if you have any of the following:    · Sudden numbness or weakness of your face, arm or leg especially on one side of the body  · Sudden confusion, diffiiculty speaking or trouble understanding   · Changes in your vision or loss of sight in one eye  · Sudden severe headache with no known cause  · sudden dizziness, trouble walking, loss of balance or coordination    It is important to seek emergency care right away if you have further stroke symptoms. If you get emergency help quickly, the powerful clot-dissolving medicines can reduce the disabilities caused by a stroke.     For more information:    American Stroke Association  1-725-6-STROKE  www.strokeassociation.org           IF YOU SMOKE OR USE TOBACCO PLEASE READ THE FOLLOWING:    Why is smoking bad for me?  Smoking increases the risk of heart disease, lung disease, vascular disease, stroke, and cancer.     If you smoke, STOP!    If you would like more information on quitting smoking, please visit the [employer] website: [email or web address]/healthier-[email or web address]   This link will provide additional resources including the QUIT line and the [employer] support groups.     For more information:    American Cancer Society  ([phone] 223-6481    American Heart Association  1-804.657.8411               YOU ARE THE MOST IMPORTANT FACTOR IN YOUR RECOVERY.     Follow all instructions carefully.     I have reviewed my discharge instructions with my nurse, including the  following information, if applicable:     Information about my illness and diagnosis   Follow up appointments (including lab draws)   Wound Care   Equipment Needs   Medications (new and continuing) along with side effects   Preventative information such as vaccines and smoking cessations   Diet   Pain   I know when to contact my Doctor's office or seek emergency care      I want my nurse to describe the side effects of my medications: YES NO   If the answer is no, I understand the side effects of my medications: YES NO   My nurse described the side effects of my medications in a way that I could understand: YES NO   I have taken my personal belongings and my own medications with me at discharge: YES NO            I have received this information and my questions have been answered. I have discussed any concerns I see with this plan with the nurse or physician. I understand these instructions.    Signature of Patient or Responsible Person: _____________________________________    Date: _________________  Time: __________________    Signature of Healthcare Provider: _______________________________________  Date: _________________  Time: __________________

## 2017-01-22 PROBLEM — J18.9 PNEUMONIA OF RIGHT LOWER LOBE DUE TO INFECTIOUS ORGANISM: Status: ACTIVE | Noted: 2017-01-22

## 2017-01-22 LAB
ALBUMIN SERPL-MCNC: 3 G/DL (ref 3.5–5.2)
ALBUMIN/GLOB SERPL: 0.9 G/DL
ALP SERPL-CCNC: 137 U/L (ref 40–129)
ALT SERPL W P-5'-P-CCNC: 9 U/L (ref 5–41)
ANION GAP SERPL CALCULATED.3IONS-SCNC: 11 MMOL/L
AST SERPL-CCNC: 13 U/L (ref 5–40)
B PERT DNA SPEC QL NAA+PROBE: NOT DETECTED
BASOPHILS # BLD AUTO: 0.04 10*3/MM3 (ref 0–0.2)
BASOPHILS NFR BLD AUTO: 0.3 % (ref 0–2)
BILIRUB SERPL-MCNC: 0.3 MG/DL (ref 0.2–1.2)
BUN BLD-MCNC: 19 MG/DL (ref 8–23)
BUN/CREAT SERPL: 20.9 (ref 7–25)
C PNEUM DNA NPH QL NAA+NON-PROBE: NOT DETECTED
CALCIUM SPEC-SCNC: 9 MG/DL (ref 8.8–10.5)
CHLORIDE SERPL-SCNC: 113 MMOL/L (ref 98–107)
CO2 SERPL-SCNC: 30 MMOL/L (ref 22–29)
CREAT BLD-MCNC: 0.91 MG/DL (ref 0.76–1.27)
D-LACTATE SERPL-SCNC: 1.4 MMOL/L (ref 0.5–2)
D-LACTATE SERPL-SCNC: 1.8 MMOL/L (ref 0.5–2)
DEPRECATED RDW RBC AUTO: 44.7 FL (ref 37–54)
EOSINOPHIL # BLD AUTO: 0.01 10*3/MM3 (ref 0.1–0.3)
EOSINOPHIL NFR BLD AUTO: 0.1 % (ref 0–4)
ERYTHROCYTE [DISTWIDTH] IN BLOOD BY AUTOMATED COUNT: 13.3 % (ref 11.5–14.5)
FLUAV H1 2009 PAND RNA NPH QL NAA+PROBE: NOT DETECTED
FLUAV H1 HA GENE NPH QL NAA+PROBE: NOT DETECTED
FLUAV H3 RNA NPH QL NAA+PROBE: NOT DETECTED
FLUAV SUBTYP SPEC NAA+PROBE: NOT DETECTED
FLUBV RNA ISLT QL NAA+PROBE: NOT DETECTED
GFR SERPL CREATININE-BSD FRML MDRD: 80 ML/MIN/1.73
GLOBULIN UR ELPH-MCNC: 3.5 GM/DL
GLUCOSE BLD-MCNC: 195 MG/DL (ref 65–99)
GLUCOSE BLDC GLUCOMTR-MCNC: 156 MG/DL (ref 70–130)
GLUCOSE BLDC GLUCOMTR-MCNC: 156 MG/DL (ref 70–130)
GLUCOSE BLDC GLUCOMTR-MCNC: 198 MG/DL (ref 70–130)
GLUCOSE BLDC GLUCOMTR-MCNC: 222 MG/DL (ref 70–130)
GLUCOSE BLDC GLUCOMTR-MCNC: 225 MG/DL (ref 70–130)
HADV DNA SPEC NAA+PROBE: NOT DETECTED
HBV SURFACE AG SERPL QL IA: NORMAL
HCOV 229E RNA SPEC QL NAA+PROBE: NOT DETECTED
HCOV HKU1 RNA SPEC QL NAA+PROBE: NOT DETECTED
HCOV NL63 RNA SPEC QL NAA+PROBE: NOT DETECTED
HCOV OC43 RNA SPEC QL NAA+PROBE: NOT DETECTED
HCT VFR BLD AUTO: 41.9 % (ref 42–52)
HGB BLD-MCNC: 13.4 G/DL (ref 14–18)
HMPV RNA NPH QL NAA+NON-PROBE: NOT DETECTED
HPIV1 RNA SPEC QL NAA+PROBE: NOT DETECTED
HPIV2 RNA SPEC QL NAA+PROBE: NOT DETECTED
HPIV3 RNA NPH QL NAA+PROBE: NOT DETECTED
HPIV4 P GENE NPH QL NAA+PROBE: NOT DETECTED
IMM GRANULOCYTES # BLD: 0.06 10*3/MM3 (ref 0–0.03)
IMM GRANULOCYTES NFR BLD: 0.4 % (ref 0–0.5)
LYMPHOCYTES # BLD AUTO: 1.79 10*3/MM3 (ref 0.6–4.8)
LYMPHOCYTES NFR BLD AUTO: 12.4 % (ref 20–45)
M PNEUMO IGG SER IA-ACNC: NOT DETECTED
MAGNESIUM SERPL-MCNC: 2.3 MG/DL (ref 1.7–2.5)
MCH RBC QN AUTO: 29.2 PG (ref 27–31)
MCHC RBC AUTO-ENTMCNC: 32 G/DL (ref 31–37)
MCV RBC AUTO: 91.3 FL (ref 80–94)
MONOCYTES # BLD AUTO: 0.92 10*3/MM3 (ref 0–1)
MONOCYTES NFR BLD AUTO: 6.4 % (ref 3–8)
NEUTROPHILS # BLD AUTO: 11.59 10*3/MM3 (ref 1.5–8.3)
NEUTROPHILS NFR BLD AUTO: 80.4 % (ref 45–70)
NRBC BLD MANUAL-RTO: 0 /100 WBC (ref 0–0)
PLATELET # BLD AUTO: 268 10*3/MM3 (ref 140–500)
PMV BLD AUTO: 11.4 FL (ref 7.4–10.4)
POTASSIUM BLD-SCNC: 3.5 MMOL/L (ref 3.5–5.2)
PROCALCITONIN SERPL-MCNC: 0.15 NG/ML (ref 0.1–0.25)
PROT SERPL-MCNC: 6.5 G/DL (ref 6–8.5)
RBC # BLD AUTO: 4.59 10*6/MM3 (ref 4.7–6.1)
RHINOVIRUS RNA SPEC NAA+PROBE: NOT DETECTED
RSV RNA NPH QL NAA+NON-PROBE: NOT DETECTED
SODIUM BLD-SCNC: 154 MMOL/L (ref 136–145)
WBC NRBC COR # BLD: 14.41 10*3/MM3 (ref 4.8–10.8)

## 2017-01-22 PROCEDURE — 84145 PROCALCITONIN (PCT): CPT | Performed by: INTERNAL MEDICINE

## 2017-01-22 PROCEDURE — 63710000001 INSULIN ASPART PER 5 UNITS: Performed by: INTERNAL MEDICINE

## 2017-01-22 PROCEDURE — G8981 BODY POS CURRENT STATUS: HCPCS | Performed by: PHYSICAL THERAPIST

## 2017-01-22 PROCEDURE — 87581 M.PNEUMON DNA AMP PROBE: CPT | Performed by: INTERNAL MEDICINE

## 2017-01-22 PROCEDURE — 94640 AIRWAY INHALATION TREATMENT: CPT

## 2017-01-22 PROCEDURE — 82962 GLUCOSE BLOOD TEST: CPT

## 2017-01-22 PROCEDURE — 63710000001 INSULIN DETEMIR PER 5 UNITS: Performed by: INTERNAL MEDICINE

## 2017-01-22 PROCEDURE — 25010000002 VANCOMYCIN PER 500 MG: Performed by: INTERNAL MEDICINE

## 2017-01-22 PROCEDURE — 25010000002 LEVOFLOXACIN PER 250 MG

## 2017-01-22 PROCEDURE — 83735 ASSAY OF MAGNESIUM: CPT | Performed by: INTERNAL MEDICINE

## 2017-01-22 PROCEDURE — 92610 EVALUATE SWALLOWING FUNCTION: CPT

## 2017-01-22 PROCEDURE — 83605 ASSAY OF LACTIC ACID: CPT | Performed by: EMERGENCY MEDICINE

## 2017-01-22 PROCEDURE — G8982 BODY POS GOAL STATUS: HCPCS | Performed by: PHYSICAL THERAPIST

## 2017-01-22 PROCEDURE — 93005 ELECTROCARDIOGRAM TRACING: CPT | Performed by: INTERNAL MEDICINE

## 2017-01-22 PROCEDURE — 25010000002 VANCOMYCIN PER 500 MG

## 2017-01-22 PROCEDURE — 87486 CHLMYD PNEUM DNA AMP PROBE: CPT | Performed by: INTERNAL MEDICINE

## 2017-01-22 PROCEDURE — 80053 COMPREHEN METABOLIC PANEL: CPT | Performed by: INTERNAL MEDICINE

## 2017-01-22 PROCEDURE — 87798 DETECT AGENT NOS DNA AMP: CPT | Performed by: INTERNAL MEDICINE

## 2017-01-22 PROCEDURE — 97162 PT EVAL MOD COMPLEX 30 MIN: CPT | Performed by: PHYSICAL THERAPIST

## 2017-01-22 PROCEDURE — 94799 UNLISTED PULMONARY SVC/PX: CPT

## 2017-01-22 PROCEDURE — 83605 ASSAY OF LACTIC ACID: CPT | Performed by: INTERNAL MEDICINE

## 2017-01-22 PROCEDURE — 97110 THERAPEUTIC EXERCISES: CPT | Performed by: PHYSICAL THERAPIST

## 2017-01-22 PROCEDURE — 99222 1ST HOSP IP/OBS MODERATE 55: CPT | Performed by: INTERNAL MEDICINE

## 2017-01-22 PROCEDURE — 25010000002 ENOXAPARIN PER 10 MG: Performed by: INTERNAL MEDICINE

## 2017-01-22 PROCEDURE — 87081 CULTURE SCREEN ONLY: CPT | Performed by: INTERNAL MEDICINE

## 2017-01-22 PROCEDURE — 93010 ELECTROCARDIOGRAM REPORT: CPT | Performed by: INTERNAL MEDICINE

## 2017-01-22 PROCEDURE — 87633 RESP VIRUS 12-25 TARGETS: CPT | Performed by: INTERNAL MEDICINE

## 2017-01-22 PROCEDURE — 85025 COMPLETE CBC W/AUTO DIFF WBC: CPT | Performed by: INTERNAL MEDICINE

## 2017-01-22 RX ORDER — IPRATROPIUM BROMIDE AND ALBUTEROL SULFATE 2.5; .5 MG/3ML; MG/3ML
3 SOLUTION RESPIRATORY (INHALATION) EVERY 4 HOURS PRN
Status: DISCONTINUED | OUTPATIENT
Start: 2017-01-22 | End: 2017-01-26 | Stop reason: HOSPADM

## 2017-01-22 RX ORDER — BACLOFEN 10 MG/1
10 TABLET ORAL 3 TIMES DAILY
Status: DISCONTINUED | OUTPATIENT
Start: 2017-01-22 | End: 2017-01-26 | Stop reason: HOSPADM

## 2017-01-22 RX ORDER — NITROGLYCERIN 0.4 MG/1
0.4 TABLET SUBLINGUAL
Status: DISCONTINUED | OUTPATIENT
Start: 2017-01-22 | End: 2017-01-26 | Stop reason: HOSPADM

## 2017-01-22 RX ORDER — ATORVASTATIN CALCIUM 20 MG/1
20 TABLET, FILM COATED ORAL DAILY
Status: DISCONTINUED | OUTPATIENT
Start: 2017-01-22 | End: 2017-01-26 | Stop reason: HOSPADM

## 2017-01-22 RX ORDER — CEFEPIME HYDROCHLORIDE 1 G/1
1 INJECTION, POWDER, FOR SOLUTION INTRAMUSCULAR; INTRAVENOUS ONCE
Status: DISCONTINUED | OUTPATIENT
Start: 2017-01-22 | End: 2017-01-22

## 2017-01-22 RX ORDER — FLUTICASONE PROPIONATE 50 MCG
2 SPRAY, SUSPENSION (ML) NASAL DAILY
Status: DISCONTINUED | OUTPATIENT
Start: 2017-01-22 | End: 2017-01-26 | Stop reason: HOSPADM

## 2017-01-22 RX ORDER — DEXTROSE MONOHYDRATE 50 MG/ML
50 INJECTION, SOLUTION INTRAVENOUS CONTINUOUS
Status: DISCONTINUED | OUTPATIENT
Start: 2017-01-22 | End: 2017-01-24

## 2017-01-22 RX ORDER — FERROUS SULFATE TAB EC 324 MG (65 MG FE EQUIVALENT) 324 (65 FE) MG
324 TABLET DELAYED RESPONSE ORAL 2 TIMES DAILY WITH MEALS
Status: DISCONTINUED | OUTPATIENT
Start: 2017-01-22 | End: 2017-01-26 | Stop reason: HOSPADM

## 2017-01-22 RX ORDER — VANCOMYCIN HYDROCHLORIDE
1250 ONCE
Status: DISCONTINUED | OUTPATIENT
Start: 2017-01-22 | End: 2017-01-22 | Stop reason: DRUGHIGH

## 2017-01-22 RX ORDER — BUMETANIDE 1 MG/1
1 TABLET ORAL 2 TIMES DAILY
Status: CANCELLED | OUTPATIENT
Start: 2017-01-22

## 2017-01-22 RX ORDER — ISOSORBIDE DINITRATE 10 MG/1
10 TABLET ORAL 3 TIMES DAILY
Status: DISCONTINUED | OUTPATIENT
Start: 2017-01-22 | End: 2017-01-26 | Stop reason: HOSPADM

## 2017-01-22 RX ORDER — LEVOFLOXACIN 5 MG/ML
INJECTION, SOLUTION INTRAVENOUS
Status: COMPLETED
Start: 2017-01-22 | End: 2017-01-22

## 2017-01-22 RX ORDER — NICOTINE POLACRILEX 4 MG
15 LOZENGE BUCCAL
Status: DISCONTINUED | OUTPATIENT
Start: 2017-01-22 | End: 2017-01-26 | Stop reason: HOSPADM

## 2017-01-22 RX ORDER — FAMOTIDINE 20 MG/1
20 TABLET, FILM COATED ORAL 2 TIMES DAILY
Status: DISCONTINUED | OUTPATIENT
Start: 2017-01-22 | End: 2017-01-26 | Stop reason: HOSPADM

## 2017-01-22 RX ORDER — DEXTROSE MONOHYDRATE 50 MG/ML
100 INJECTION, SOLUTION INTRAVENOUS CONTINUOUS
Status: DISCONTINUED | OUTPATIENT
Start: 2017-01-22 | End: 2017-01-22 | Stop reason: CLARIF

## 2017-01-22 RX ORDER — POTASSIUM CHLORIDE 20MEQ/15ML
20 LIQUID (ML) ORAL DAILY
Status: DISCONTINUED | OUTPATIENT
Start: 2017-01-22 | End: 2017-01-22 | Stop reason: CLARIF

## 2017-01-22 RX ORDER — SODIUM CHLORIDE 0.9 % (FLUSH) 0.9 %
1-10 SYRINGE (ML) INJECTION AS NEEDED
Status: DISCONTINUED | OUTPATIENT
Start: 2017-01-22 | End: 2017-01-26 | Stop reason: HOSPADM

## 2017-01-22 RX ORDER — BISACODYL 10 MG
10 SUPPOSITORY, RECTAL RECTAL DAILY PRN
Status: DISCONTINUED | OUTPATIENT
Start: 2017-01-22 | End: 2017-01-26 | Stop reason: HOSPADM

## 2017-01-22 RX ORDER — SODIUM CHLORIDE 9 MG/ML
INJECTION, SOLUTION INTRAVENOUS
Status: COMPLETED
Start: 2017-01-22 | End: 2017-01-22

## 2017-01-22 RX ORDER — SENNOSIDES 8.6 MG
1 TABLET ORAL 2 TIMES DAILY
Status: DISCONTINUED | OUTPATIENT
Start: 2017-01-22 | End: 2017-01-26 | Stop reason: HOSPADM

## 2017-01-22 RX ORDER — MONTELUKAST SODIUM 10 MG/1
10 TABLET ORAL NIGHTLY
Status: DISCONTINUED | OUTPATIENT
Start: 2017-01-22 | End: 2017-01-26 | Stop reason: HOSPADM

## 2017-01-22 RX ORDER — DOCUSATE SODIUM 100 MG/1
100 CAPSULE, LIQUID FILLED ORAL 2 TIMES DAILY
Status: DISCONTINUED | OUTPATIENT
Start: 2017-01-22 | End: 2017-01-26 | Stop reason: HOSPADM

## 2017-01-22 RX ORDER — DEXTROSE MONOHYDRATE 25 G/50ML
25 INJECTION, SOLUTION INTRAVENOUS
Status: DISCONTINUED | OUTPATIENT
Start: 2017-01-22 | End: 2017-01-26 | Stop reason: HOSPADM

## 2017-01-22 RX ORDER — LEVOFLOXACIN 5 MG/ML
500 INJECTION, SOLUTION INTRAVENOUS EVERY 24 HOURS
Status: DISCONTINUED | OUTPATIENT
Start: 2017-01-22 | End: 2017-01-25

## 2017-01-22 RX ORDER — SODIUM CHLORIDE 9 MG/ML
75 INJECTION, SOLUTION INTRAVENOUS CONTINUOUS
Status: DISCONTINUED | OUTPATIENT
Start: 2017-01-22 | End: 2017-01-22

## 2017-01-22 RX ORDER — BUDESONIDE 0.5 MG/2ML
0.5 INHALANT ORAL 2 TIMES DAILY
Status: DISCONTINUED | OUTPATIENT
Start: 2017-01-22 | End: 2017-01-26 | Stop reason: HOSPADM

## 2017-01-22 RX ORDER — GUAIFENESIN 600 MG/1
600 TABLET, EXTENDED RELEASE ORAL 2 TIMES DAILY
Status: DISCONTINUED | OUTPATIENT
Start: 2017-01-22 | End: 2017-01-26 | Stop reason: HOSPADM

## 2017-01-22 RX ORDER — ACETAMINOPHEN AND CODEINE PHOSPHATE 300; 30 MG/1; MG/1
1 TABLET ORAL EVERY 6 HOURS PRN
Status: DISCONTINUED | OUTPATIENT
Start: 2017-01-22 | End: 2017-01-26 | Stop reason: HOSPADM

## 2017-01-22 RX ORDER — POTASSIUM CHLORIDE 1.5 G/1.77G
20 POWDER, FOR SOLUTION ORAL DAILY
Status: DISCONTINUED | OUTPATIENT
Start: 2017-01-22 | End: 2017-01-26 | Stop reason: HOSPADM

## 2017-01-22 RX ORDER — ONDANSETRON 2 MG/ML
4 INJECTION INTRAMUSCULAR; INTRAVENOUS EVERY 6 HOURS PRN
Status: DISCONTINUED | OUTPATIENT
Start: 2017-01-22 | End: 2017-01-26 | Stop reason: HOSPADM

## 2017-01-22 RX ADMIN — FAMOTIDINE 20 MG: 20 TABLET, FILM COATED ORAL at 09:42

## 2017-01-22 RX ADMIN — BACLOFEN 10 MG: 10 TABLET ORAL at 15:48

## 2017-01-22 RX ADMIN — DEXTROSE MONOHYDRATE 100 ML/HR: 50 INJECTION, SOLUTION INTRAVENOUS at 09:00

## 2017-01-22 RX ADMIN — INSULIN ASPART 2 UNITS: 100 INJECTION, SOLUTION INTRAVENOUS; SUBCUTANEOUS at 17:14

## 2017-01-22 RX ADMIN — LEVOFLOXACIN 500 MG: 500 INJECTION, SOLUTION INTRAVENOUS at 06:57

## 2017-01-22 RX ADMIN — BISACODYL 10 MG: 10 SUPPOSITORY RECTAL at 15:43

## 2017-01-22 RX ADMIN — ISOSORBIDE DINITRATE 10 MG: 10 TABLET ORAL at 15:48

## 2017-01-22 RX ADMIN — IPRATROPIUM BROMIDE AND ALBUTEROL SULFATE 3 ML: .5; 3 SOLUTION RESPIRATORY (INHALATION) at 08:09

## 2017-01-22 RX ADMIN — DOCUSATE SODIUM 100 MG: 100 CAPSULE, LIQUID FILLED ORAL at 17:18

## 2017-01-22 RX ADMIN — GUAIFENESIN 600 MG: 600 TABLET, EXTENDED RELEASE ORAL at 09:41

## 2017-01-22 RX ADMIN — FERROUS SULFATE TAB EC 324 MG (65 MG FE EQUIVALENT) 324 MG: 324 (65 FE) TABLET DELAYED RESPONSE at 09:41

## 2017-01-22 RX ADMIN — GUAIFENESIN 600 MG: 600 TABLET, EXTENDED RELEASE ORAL at 17:17

## 2017-01-22 RX ADMIN — DEXTROSE MONOHYDRATE 100 ML/HR: 50 INJECTION, SOLUTION INTRAVENOUS at 21:32

## 2017-01-22 RX ADMIN — INSULIN DETEMIR 23 UNITS: 100 INJECTION, SOLUTION SUBCUTANEOUS at 20:28

## 2017-01-22 RX ADMIN — BACLOFEN 10 MG: 10 TABLET ORAL at 20:06

## 2017-01-22 RX ADMIN — POTASSIUM CHLORIDE 20 MEQ: 1.5 POWDER, FOR SOLUTION ORAL at 09:46

## 2017-01-22 RX ADMIN — INSULIN DETEMIR 23 UNITS: 100 INJECTION, SOLUTION SUBCUTANEOUS at 09:02

## 2017-01-22 RX ADMIN — ACETAMINOPHEN AND CODEINE PHOSPHATE 1 TABLET: 300; 30 TABLET ORAL at 20:06

## 2017-01-22 RX ADMIN — ACETAMINOPHEN AND CODEINE PHOSPHATE 1 TABLET: 300; 30 TABLET ORAL at 12:30

## 2017-01-22 RX ADMIN — VANCOMYCIN HYDROCHLORIDE 1250 MG: 1 INJECTION, POWDER, LYOPHILIZED, FOR SOLUTION INTRAVENOUS at 01:13

## 2017-01-22 RX ADMIN — SENNOSIDES 8.6 MG: 8.6 TABLET, FILM COATED ORAL at 17:18

## 2017-01-22 RX ADMIN — SODIUM CHLORIDE 75 ML/HR: 9 INJECTION, SOLUTION INTRAVENOUS at 03:22

## 2017-01-22 RX ADMIN — AZTREONAM 2 G: 2 INJECTION, POWDER, LYOPHILIZED, FOR SOLUTION INTRAMUSCULAR; INTRAVENOUS at 11:15

## 2017-01-22 RX ADMIN — DOCUSATE SODIUM 100 MG: 100 CAPSULE, LIQUID FILLED ORAL at 09:41

## 2017-01-22 RX ADMIN — SITAGLIPTIN 100 MG: 100 TABLET, FILM COATED ORAL at 09:42

## 2017-01-22 RX ADMIN — FERROUS SULFATE TAB EC 324 MG (65 MG FE EQUIVALENT) 324 MG: 324 (65 FE) TABLET DELAYED RESPONSE at 17:17

## 2017-01-22 RX ADMIN — INSULIN ASPART 3 UNITS: 100 INJECTION, SOLUTION INTRAVENOUS; SUBCUTANEOUS at 12:10

## 2017-01-22 RX ADMIN — Medication 1 TABLET: at 17:17

## 2017-01-22 RX ADMIN — FAMOTIDINE 20 MG: 20 TABLET, FILM COATED ORAL at 17:18

## 2017-01-22 RX ADMIN — INSULIN ASPART 2 UNITS: 100 INJECTION, SOLUTION INTRAVENOUS; SUBCUTANEOUS at 08:26

## 2017-01-22 RX ADMIN — ENOXAPARIN SODIUM 80 MG: 80 INJECTION SUBCUTANEOUS at 09:45

## 2017-01-22 RX ADMIN — INSULIN ASPART 3 UNITS: 100 INJECTION, SOLUTION INTRAVENOUS; SUBCUTANEOUS at 17:15

## 2017-01-22 RX ADMIN — MONTELUKAST SODIUM 10 MG: 10 TABLET, FILM COATED ORAL at 20:06

## 2017-01-22 RX ADMIN — BACLOFEN 10 MG: 10 TABLET ORAL at 09:43

## 2017-01-22 RX ADMIN — ISOSORBIDE DINITRATE 10 MG: 10 TABLET ORAL at 09:42

## 2017-01-22 RX ADMIN — BUDESONIDE 0.5 MG: 0.5 SUSPENSION RESPIRATORY (INHALATION) at 19:32

## 2017-01-22 RX ADMIN — Medication 1 TABLET: at 09:41

## 2017-01-22 RX ADMIN — ATORVASTATIN CALCIUM 20 MG: 20 TABLET, FILM COATED ORAL at 09:42

## 2017-01-22 RX ADMIN — BUDESONIDE 0.5 MG: 0.5 SUSPENSION RESPIRATORY (INHALATION) at 08:09

## 2017-01-22 RX ADMIN — ISOSORBIDE DINITRATE 10 MG: 10 TABLET ORAL at 20:06

## 2017-01-22 RX ADMIN — ENOXAPARIN SODIUM 80 MG: 80 INJECTION SUBCUTANEOUS at 20:06

## 2017-01-22 RX ADMIN — AZTREONAM 2 G: 2 INJECTION, POWDER, LYOPHILIZED, FOR SOLUTION INTRAMUSCULAR; INTRAVENOUS at 18:48

## 2017-01-22 RX ADMIN — INSULIN ASPART 4 UNITS: 100 INJECTION, SOLUTION INTRAVENOUS; SUBCUTANEOUS at 12:09

## 2017-01-22 RX ADMIN — SODIUM CHLORIDE 1000 MG: 900 INJECTION, SOLUTION INTRAVENOUS at 12:35

## 2017-01-22 RX ADMIN — FLUTICASONE PROPIONATE 2 SPRAY: 50 SPRAY, METERED NASAL at 09:45

## 2017-01-22 RX ADMIN — SENNOSIDES 8.6 MG: 8.6 TABLET, FILM COATED ORAL at 09:42

## 2017-01-22 NOTE — PLAN OF CARE
Problem: Patient Care Overview (Adult)  Goal: Plan of Care Review    01/22/17 1121   Coping/Psychosocial Response Interventions   Plan Of Care Reviewed With patient   Outcome Evaluation   Outcome Summary/Follow up Plan Patient admitted with pna from [facility]. At baseline, patient is able to transfer from bed /chair/toilet with assist of one but is nonambulatory, using wheelchair per nurse Rec[facility] at [facility]. Patient requires max assist of 2 for transfers supine to sit . Patient with difficulty sitting upright on edge of bed with increased complaint of pain in left trunk area. PT services will continue to follow patient to progress transfers and bed mobility to progress to prior level of function.

## 2017-01-22 NOTE — ED NOTES
Dr. Hawkins notified that no bowel sounds were audible by this nurse.  Abdomen soft.     Jacy Davila RN  01/22/17 0015

## 2017-01-22 NOTE — PLAN OF CARE
Problem: Inpatient Physical Therapy  Goal: Bed Mobility Goal STG- PT    01/22/17 1128   Bed Mobility PT STG   Bed Mobility PT STG, Date Established 01/22/17   Bed Mobility PT STG, Time to Achieve 3 days   Bed Mobility PT STG, Activity Type all bed mobility   Bed Mobility PT STG, [place] Level minimum assist (75% patient effort)       Goal: Transfer Training Goal 1 STG- PT    01/22/17 1128   Transfer Training PT STG   Transfer Training PT STG, Date Established 01/22/17   Transfer Training PT STG, Time to Achieve 5 days   Transfer Training PT STG, Activity Type bed to chair /chair to bed   Transfer Training PT STG, [place] Level minimum assist (75% patient effort)       Goal: Transfer Training Goal 1 LTG- PT    01/22/17 1128   Transfer Training PT LTG   Transfer Training PT LTG, Date Established 01/22/17   Transfer Training PT LTG, Activity Type sit to stand/stand to sit   Transfer Training PT LTG, [place] Level minimum assist (75% patient effort)

## 2017-01-22 NOTE — NURSING NOTE
With permission of patient spoke with wife and two sisters at bedside.   Patient has been a resident at [facility].  He is wheelchair bound.  He does not wear oxygen at NH.  He and family says the plan is back to [facility] when medically ready.     will need to call Nursing Home on Monday to speak with admission liaison for bed hold status.   Please advise of any other needs.

## 2017-01-22 NOTE — PLAN OF CARE
Problem: Patient Care Overview (Adult)  Goal: Plan of Care Review  Outcome: Ongoing (interventions implemented as appropriate)    01/22/17 1401   Coping/Psychosocial Response Interventions   Plan Of Care Reviewed With family  (Nursing staff)   Outcome Evaluation   Outcome Summary/Follow up Plan Clinical Swallow Eval: Pt with mild- moderate oropharyngeal dysphagia per eval and diet. Recent FEES completed at [facility] with change in diet. Agree with [facility] soft with nectar thick liquids. Rec upright positioning during all po and 30 minutes after due to hx of reflux, oral care before breakfast and after all meals and prn as needed. Also further recommend to soak dentures at night due to poor oral condition during the evaluation. Will follow up for report from [facility] before making further recommendations. See report.

## 2017-01-22 NOTE — PLAN OF CARE
Admitted for + sepsis screen. Pt recently on PO ceftin and changed to PO doxy at NH, presented with severe hyperglycemia and abd pain, 15K WBC count and tachycardia, and reported low sats but charting shows sats> 90% and CXR shows no focal infiltrate.+ urinary retention and + constipation with fecal impaction per ED.  Plan:  Admit for urinary retention, dehydration, fredy, constipation, + sepsis screen.  - cover empirically with broad spectrums but these can likely be stopped tomorrow as no source, urine clear, CXR clear. Leukocytosis likely result of constipation, acute urinary retention/obstruction, dehydration    ID:  Sepsis bundle recommends vanc/aztreonam/levaquin, will get single dose only, continue if needed.  - procal and blood cx pending  - CT showed basal RLL infiltrate per radiology, not apparent on CXR as much.    Urinary retention:   fuller placed, consult urology in AM    Constipation:  - po senna, docusate  - rectal bisacodyl  - iv hydration with NS and hold bumex as pt clinically dry on labs with FREDY and evidence of hemoconcentration compared to prior labs.    Dehydration:  - resultant of hyperglysemia, glucosuria, diuretics  - hold bumex for now  - IV NS at 100c/hr  - treat hyperglycemia    DM with hyperglycemia:  - home lantus converted to BID levemir 23 units here  - cont home novolog 3U TID  - added SSI medium dose, reasess basal and mealtime insulin needs according to SSI need and readjust  - goal 150-200  - cardiac/diabetic diet.

## 2017-01-22 NOTE — PLAN OF CARE
Problem: Fall Risk (Adult)  Goal: Identify Related Risk Factors and Signs and Symptoms  Outcome: Ongoing (interventions implemented as appropriate)

## 2017-01-22 NOTE — ED NOTES
Oxygen discontinued at this time to evaluate how patient tolerates room air.     Jacy Davila RN  01/21/17 7107

## 2017-01-22 NOTE — ED PROVIDER NOTES
Subjective   History of Present Illness  History of Present Illness    Chief complaint: Elevated blood sugar    Location: Nursing home    Quality/Severity:  Blood sugar of approximately 560    Timing/Onset/Duration: It was noted today at the nursing home    Modifying Factors: Modifying factors are unknown    Associated Symptoms: The patient cannot give associated symptoms, he is nonverbal    Narrative: This 81-year-old white male was noted to have a blood sugar of approximately 560 at the nursing home.  The patient was sent here for further evaluation.  The patient completed Ceftin 2 days ago.  The patient is currently on doxycycline.    PCP:  Bette      Review of Systems   Reason unable to perform ROS: non verbal.        Medication List      ASK your doctor about these medications          ACETAMINOPHEN-CODEINE #3 PO       baclofen 10 MG tablet   Commonly known as:  LIORESAL       budesonide 0.5 MG/2ML nebulizer solution   Commonly known as:  PULMICORT       bumetanide 1 MG tablet   Commonly known as:  BUMEX       cefuroxime 250 MG tablet   Commonly known as:  CEFTIN   Take 1 tablet by mouth 2 (Two) Times a Day.       enoxaparin 80 MG/0.8ML solution syringe   Commonly known as:  LOVENOX       famotidine 20 MG tablet   Commonly known as:  PEPCID       ferrous sulfate 220 (44 FE) MG/5ML solution       FLONASE 50 MCG/ACT nasal spray   Generic drug:  fluticasone       Insulin Glargine 100 UNIT/ML injection pen   Commonly known as:  LANTUS SOLOSTAR       ipratropium-albuterol 0.5-2.5 mg/mL nebulizer   Commonly known as:  DUO-NEB       isosorbide dinitrate 10 MG tablet   Commonly known as:  ISORDIL       montelukast 10 MG tablet   Commonly known as:  SINGULAIR       potassium chloride 20 MEQ/15ML (10%) solution   Commonly known as:  KAYCIEL       Senna 8.8 MG/5ML syrup       simvastatin 10 MG tablet   Commonly known as:  ZOCOR       SITagliptin 100 MG tablet   Commonly known as:  JANUVIA       sodium chloride 1 G tablet        vitamin D 75994 UNITS capsule capsule   Commonly known as:  ERGOCALCIFEROL           Past Medical History   Diagnosis Date   • Anemia    • Arthritis    • CAD (coronary artery disease)    • CHF (congestive heart failure)    • Chronic pain    • Constipation    • COPD (chronic obstructive pulmonary disease)    • Diabetes mellitus    • 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    No family history on file.    Social History     Social History   • Marital status:      Spouse name: N/A   • Number of children: N/A   • Years of education: N/A     Social History Main Topics   • Smoking status: Former Smoker   • Smokeless tobacco: Not on file   • Alcohol use No   • Drug use: No   • Sexual activity: Defer     Other Topics Concern   • Not on file     Social History Narrative   • No narrative on file           Objective   Physical Exam   Constitutional: He appears well-developed and well-nourished. No distress.   ED Triage Vitals:  Temp: 100.5 °F (38.1 °C) (01/21/17 2035)  Heart Rate: 131 (01/21/17 2035)  Resp: 32 (01/21/17 2035)  BP: 161/107 (01/21/17 2035)  SpO2: 90 % (01/21/17 2035)  Temp src: Rectal (01/21/17 2035)  Heart Rate Source: Monitor (01/21/17 2035)  Patient Position: Lying (01/21/17 2035)  BP Location: Right arm (01/21/17 2035)  FiO2 (%): n/a    The patient's vitals were reviewed by me.  Unless otherwise noted they are within normal limits.  The patient is febrile with a temperature 100.5.  The patient is tachypneic with a respiratory rate of 32.  The patient is tachycardic with a heart rate of 131.  The patient is hypertensive with a blood pressure  of 161/107.  The patient's saturations are borderline low at 90%.   HENT:   Head: Normocephalic and atraumatic.   Right Ear: External ear normal.   Left Ear: External ear normal.   Nose: Nose normal.   Dry mucous membranes   Eyes: Conjunctivae and EOM are normal. Pupils are equal, round, and reactive to light. Right eye exhibits no discharge. Left eye exhibits no discharge. No scleral icterus.   Neck: Normal range of motion. Neck supple. No JVD present. No tracheal deviation present. No thyromegaly present.   No meningeal signs   Cardiovascular: Regular rhythm, normal heart sounds and intact distal pulses.  Exam reveals no gallop and no friction rub.    No murmur heard.  Pulmonary/Chest: Effort normal. No stridor. No respiratory distress. He has wheezes (mild bilateral expiratory wheeze). He has no rales. He exhibits no tenderness.   Abdominal: Soft. Bowel sounds are normal. He exhibits no distension and no mass. There is no tenderness. There is no rebound and no guarding. No hernia.   Musculoskeletal: Normal range of motion. He exhibits no edema or deformity.   Lymphadenopathy:     He has no cervical adenopathy.   Neurological:   The patient is sleeping but arousable.  The patient localizes pain.  The patient's pupils were equal round reactive to light.   Skin: Skin is warm and dry. No rash noted. He is not diaphoretic. No erythema. No pallor.   Psychiatric: His behavior is normal.   Nursing note and vitals reviewed.      Procedures         ED Course  ED Course   Comment By Time   The laboratory values were reviewed by me.  The urinalysis shows 6-12 RBCs, 0-2 WBCs, trace bacteria, greater than 1000 glucose, trace blood.  The venous lactase is 2.2.  The white blood cell count is 17,000, the neutrophil percent is 88%.  The serum glucose is 564, the BUN is 25, the sodium is 147, and alkaline phosphatase is 181.  The laboratory values are otherwise unremarkable. Melvin Hawkins MD 01/21 2218      12:31 AM,  01/22/17:  The nurse noted a distended bladder upon the patient's presentation and placed a Perry catheter and 1400 cc of urine was returned.  \  12:22 AM, 01/22/17:  The patient was reassessed.  His vital signs were stable.  Neurological exam: He appears to be more alert and has actually spoken some words.  There is no focal deficits noted.  Abdominal exam: Soft nontender no masses positive bowel sounds    12:22 AM, 01/22/17:  The patient was partially digitally disimpacted.  His rectal exam tested heme negative.  There was a normal tone and no masses.    12:30 AM, 01/22/17:  The pharmacy was consulted for vancomycin loading dose and recommended 1250 mg ×1 then 1000 mg every 24 hours.  This is being given for healthcare associated pneumonia.    12:36 AM, 01/22/17:  Dr. Rizo, on call for the hospitalist, he will admit the patient.  He also wanted the patient to get cefepime.        MDM  lowernote  Final diagnoses:   None         ED Medications:  Medications   sodium chloride 0.9 % flush 10 mL (not administered)   sodium chloride 0.9 % bolus 1,000 mL (1,000 mL Intravenous New Bag 1/21/17 2121)   cefTRIAXone (ROCEPHIN) IVPB 1 g (not administered)   azithromycin 500 mg IVPB in 250 mL NS (not administered)   insulin regular (humuLIN R,novoLIN R) injection 10 Units (10 Units Intravenous Given 1/21/17 2140)       New Medications:     Medication List      ASK your doctor about these medications          ACETAMINOPHEN-CODEINE #3 PO       baclofen 10 MG tablet   Commonly known as:  LIORESAL       budesonide 0.5 MG/2ML nebulizer solution   Commonly known as:  PULMICORT       bumetanide 1 MG tablet   Commonly known as:  BUMEX       cefuroxime 250 MG tablet   Commonly known as:  CEFTIN   Take 1 tablet by mouth 2 (Two) Times a Day.       enoxaparin 80 MG/0.8ML solution syringe   Commonly known as:  LOVENOX       famotidine 20 MG tablet   Commonly known as:  PEPCID       ferrous sulfate 220 (44 FE) MG/5ML solution       FLONASE  50 MCG/ACT nasal spray   Generic drug:  fluticasone       Insulin Glargine 100 UNIT/ML injection pen   Commonly known as:  LANTUS SOLOSTAR       ipratropium-albuterol 0.5-2.5 mg/mL nebulizer   Commonly known as:  DUO-NEB       isosorbide dinitrate 10 MG tablet   Commonly known as:  ISORDIL       montelukast 10 MG tablet   Commonly known as:  SINGULAIR       potassium chloride 20 MEQ/15ML (10%) solution   Commonly known as:  KAYCIEL       Senna 8.8 MG/5ML syrup       simvastatin 10 MG tablet   Commonly known as:  ZOCOR       SITagliptin 100 MG tablet   Commonly known as:  JANUVIA       sodium chloride 1 G tablet       vitamin D 43268 UNITS capsule capsule   Commonly known as:  ERGOCALCIFEROL           Stopped Medications:     Medication List      ASK your doctor about these medications          ACETAMINOPHEN-CODEINE #3 PO       baclofen 10 MG tablet   Commonly known as:  LIORESAL       budesonide 0.5 MG/2ML nebulizer solution   Commonly known as:  PULMICORT       bumetanide 1 MG tablet   Commonly known as:  BUMEX       cefuroxime 250 MG tablet   Commonly known as:  CEFTIN   Take 1 tablet by mouth 2 (Two) Times a Day.       enoxaparin 80 MG/0.8ML solution syringe   Commonly known as:  LOVENOX       famotidine 20 MG tablet   Commonly known as:  PEPCID       ferrous sulfate 220 (44 FE) MG/5ML solution       FLONASE 50 MCG/ACT nasal spray   Generic drug:  fluticasone       Insulin Glargine 100 UNIT/ML injection pen   Commonly known as:  LANTUS SOLOSTAR       ipratropium-albuterol 0.5-2.5 mg/mL nebulizer   Commonly known as:  DUO-NEB       isosorbide dinitrate 10 MG tablet   Commonly known as:  ISORDIL       montelukast 10 MG tablet   Commonly known as:  SINGULAIR       potassium chloride 20 MEQ/15ML (10%) solution   Commonly known as:  KAYCIEL       Senna 8.8 MG/5ML syrup       simvastatin 10 MG tablet   Commonly known as:  ZOCOR       SITagliptin 100 MG tablet   Commonly known as:  JANUVIA       sodium chloride 1 G  tablet       vitamin D 69068 UNITS capsule capsule   Commonly known as:  ERGOCALCIFEROL             Final diagnoses:   Pneumonia of right lower lobe due to infectious organism   Sepsis, due to unspecified organism   Urinary retention   Prostatic hypertrophy   Hyperglycemia   Fecal impaction            Melvin Hawkins MD  01/22/17 0052

## 2017-01-22 NOTE — PROGRESS NOTES
Pharmacy Vancomycin Renal dosing consult  Initiate dose at  1000  mg IVPB every 12   hours  .  Serum creatinine will be ordered per policy.  Plan for trough as patient approaches steady state, prior to the 4 th dose.  Due to infection severity, will target a trough of 15-20.   Pharmacy will continue to follow the patient’s culture results and clinical progress daily.   Day  1  Of therapy   WBC= 14.41   Platelets= 268   Scr = 0.91   Est CrCL= 65.2   Next level is due: Monday 1/23/17 at 12 noon

## 2017-01-22 NOTE — CONSULTS
[facility]    Reason: pneumonia    HPI:  Mr. Chung is an 82yo WM with a history of a recently treated pneumonia.  He has some underlying dementia, most history is obtained from the chart.  He was brought to ER last night with elevated blood sugar, however he had temp 100.5 and rr of 32 and fecal impaction.  Ct ab/pelvis, did show a right lower lobe infiltrate c/w pneumonia.  He does say he has a cough at times, it is a little wet currently.  The infiltrate is new compated with ct abd/pelvis earlier this month and he did have leukocytosis with left shift.      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    • GERD (gastroesophageal reflux disease)    • Hyperlipidemia    • Hypertension    • DEYSI (iron deficiency anemia)    • MRSA (methicillin resistant Staphylococcus aureus)    • Osteoarthritis    • Osteoporosis    • Pneumonia      aspiration pneumonia   • TIA (transient ischemic attack)      Social History     Social History   • Marital status:      Spouse name: N/A   • Number of children: N/A   • Years of education: N/A     Social History Main Topics   • Smoking status: Former Smoker   • Smokeless tobacco: Not on file   • Alcohol use No   • Drug use: No   • Sexual activity: Defer     Other Topics Concern   • Not on file     Social History Narrative     No family history on file.  Meds: reviewed  ALL: pcn, flomax, pravachol, prevacid    Vital Sign Min/Max for last 24 hours  Temp  Min: 97.7 °F (36.5 °C)  Max: 100.5 °F (38.1 °C)   BP  Min: 122/83  Max: 161/107   Pulse  Min: 89  Max: 131   Resp  Min: 14  Max: 32   SpO2  Min: 90 %  Max: 99 %   Flow (L/min)  Min: 2  Max: 3    Weight  Min: 125 lb (56.7 kg)  Max: 159 lb 9.6 oz (72.4 kg)     GEN:  NAD, AxOx2  HEENT: PERRL, EOMI, no icterus, mmm, no jvd, trachea midline, neck supple  CHEST: decrased bilat, no wheezes, faint crackles right base, no use of accessory muscles  CV: RRR, no m/g/r  ABD: soft, nt, nd +bs, no hepatosplenomegaly  EXT: no c/c/e  SKIN: no rashes, no xanthomas, nl turgor  LYMPH: no palpable cervical or supraclavicular lymphadenopathy  NEURO: CN 2-12 intact and symmetric bilaterally  PSYCH: a little confused  MSK: mild kyphosis, moves all 4 extremities, but very weak    Labs:  Na 154  Bicarb 30  Wbc 14.4  hgb 13.4    Imaging: reviewed    A/P:  1. Pneumonia -- I am concerned he has recurrent aspiration pneumonia, possibly this is just pneumonitis.  Procal has been ordered, I would continue current antibiotics for now, likely switch to focus on just aspiration if procal is low. Will order swallow eval as well.  2. Sepsis -- iv fluids and antibiotics have been given  3. Hyponatremia -- suspect dehydration  4. Metabolic alkalosis  5. Dementia    Await procalcitonin, check swallow eval.  Will follow, thanks.

## 2017-01-22 NOTE — ED NOTES
"When this nurse walked into the room, patient states, \"I'm cold.\"  Warm blankets applied.  When asked if that felt better, patient nodded his head yes and states, \"yes.\"     Jacy Davila RN  01/21/17 6342    "

## 2017-01-22 NOTE — PROGRESS NOTES
Acute Care - Physical Therapy Initial Evaluation   Brianne Lowe     Patient Name: Chance Bocanegra  : 1935  MRN: [id number]  Today's Date: 2017   Onset of Illness/Injury or Date of Surgery Date: 17  Date of Referral to PT: 17  Referring Physician: Dr. Rizo      Admit Date: 2017     Visit Dx:    ICD-10-CM ICD-9-CM   1. Pneumonia of right lower lobe due to infectious organism J18.9 483.8   2. Sepsis, due to unspecified organism A41.9 038.9     995.91   3. Urinary retention R33.9 788.20   4. Prostatic hypertrophy N40.0 600.90   5. Hyperglycemia R73.9 790.29   6. 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       PT ASSESSMENT (last 72 hours)      PT Evaluation       17 1000 17 0227    Rehab Evaluation    Document Type evaluation  -SP     Subjective Information agree to therapy;complains of;pain  -SP     Patient Effort, Rehab Treatment fair  -SP     General Information    Patient Profile Review yes  -SP     Onset of Illness/Injury or Date of Surgery Date 17  -SP     Referring Physician Dr. Rizo  -SP     General Observations Patient supine with HOB elevated, nursing present and patient's brother.    -SP     Pertinent History Of Current Problem Patient is resident of [facility].  Per chart, he fell at NH 1-4-17 and x-ray showed T-12 fracture of unknown chronicity.  Spoke with nurse Almita at [facility] and she reports that prior to this fall patient could transfer in/out bed and on off toliet with SBA, since fall he is requiring more assist of one person for transfers.  She states that he does not ambulate, but is able to independently propel wheelchair.  Patient's brother states that he has observed patient to transfer in NH with assist and use wheelchair, has not ambulated for prolonged period of time.   Patient admitted to [facility] with (+) sepsis screen, PNA   -SP     Prior Level of Function transfer;w/c or scooter   assist required for transfers; uses w/c  -SP     Equipment Currently Used at Home wheelchair  -SP     Plans/Goals Discussed With patient;agreed upon  -SP     Risks Reviewed increased discomfort  -SP     Benefits Reviewed increase independence  -SP     Barriers to Rehab medically complex;previous functional deficit  -SP     Living Environment    Lives With facility resident  -SP facility resident  -AM    Living Arrangements residential facility;extended care facility  -SP residential facility  -AM    Home Accessibility no concerns  -SP no concerns  -AM    Stair Railings at Home  --   nursing home resident  -AM    Type of Financial/Environmental Concern none  -SP none  -AM    Transportation Available none  -SP none  -AM    Living Environment Comment  none  -AM    Clinical Impression    Date of Referral to PT 01/22/17  -SP     PT Diagnosis impaired mobility  -SP     Patient/Family Goals Statement decrease pain with movement  -SP     Pathology/Pathophysiology Noted (Describe Specifically for Each System) musculoskeletal  -SP     Impairments Found (describe specific impairments) gait, locomotion, and balance  -SP     Predicted Duration of Therapy Intervention (days/wks) 5 days  -SP     Vision  Assessment/Intervention    Visual Impairment visual impairment, bilaterally  -SP     Cognitive Assessment/Intervention    Current Cognitive/Communication Assessment functional  -SP     Orientation Status oriented to;person;situation  -SP     Follows Commands/Answers Questions 100% of the time  -SP     Personal Safety WNL/WFL  -SP     Personal Safety Interventions gait belt;nonskid shoes/slippers when out of bed  -SP     General LE Assessment    ROM --   (B) LE AROM grossly wfl to allow transfers  -SP     MMT (Manual Muscle Testing)    General MMT Assessment lower extremity strength deficits identified  -SP     General MMT Assessment Detail (B) LE strength grossly 3/5 throughout  -SP     Bed Mobility, Assessment/Treatment    Bed Mobility, Roll Right, [place] moderate assist (50% patient effort);verbal cues required  -SP     Bed Mobility, Scoot/Bridge, [place] maximum assist (25% patient effort);2 person assist required  -SP     Bed Mob, Supine to Sit, [place] maximum assist (25% patient effort);2 person assist required  -SP     Bed Mob, Sit to Supine, [place] maximum assist (25% patient effort);2 person assist required  -SP     Transfer Assessment/Treatment    Transfer, Comment patient leans to and supports self on right UE and is unable to sit upright independently on edge of bed  -SP     Gait Assessment/Treatment    Gait, Comment patient nonambulatory  -SP     Positioning and Restraints    Pre-Treatment Position in bed  -SP     Post Treatment Position bed  -SP     In Bed notified nsg;supine;call light within reach;encouraged to call for assist  -SP       User Key  (r) = Recorded By, (t) = Taken By, (c) = Cosigned By    Initials Name Provider Type    SP Elda Ramirez, ANGELINA Physical Therapist    RICH Abel RN Registered Nurse          Physical Therapy Education     Title: PT OT SLP Therapies (Done)     Topic: Physical Therapy (Done)     Point: Mobility training (Done)     Learning Progress Summary    Learner Readiness Method Response Comment Documented by Status   Patient Acceptance E VU,NR transfers and importance of activity SP 01/22/17 1121 Done                      User Key     Initials Effective Dates Name Provider Type Discipline    ALYSSA 08/11/15 -  Elda Ramirez, PT Physical Therapist PT                PT Recommendation and Plan  Planned Therapy Interventions: balance training, bed mobility training, transfer training  PT Frequency: daily, 5 times/wk  Plan of Care Review  Plan Of Care Reviewed With: patient  Outcome Summary/Follow up Plan: Patient admitted with pna from [facility].  At baseline, patient is able to transfer from bed /chair/toilet with assist of one but is nonambulatory, using wheelchair per nurse Recio at [facility].   Patient  requires max assist of 2 for transfers supine to sit .  Patient with difficulty sitting upright on edge of bed with increased complaint of pain in left trunk area.  PT services will continue to follow patient to progress transfers and bed mobility to progress to prior level of function.          IP PT Goals       01/22/17 1128          Bed Mobility PT STG    Bed Mobility PT STG, Date Established 01/22/17  -SP      Bed Mobility PT STG, Time to Achieve 3 days  -SP      Bed Mobility PT STG, Activity Type all bed mobility  -SP      Bed Mobility PT STG, [place] Level minimum assist (75% patient effort)  -SP      Transfer Training PT STG    Transfer Training PT STG, Date Established 01/22/17  -SP      Transfer Training PT STG, Time to Achieve 5 days  -SP      Transfer Training PT STG, Activity Type bed to chair /chair to bed  -SP      Transfer Training PT STG, [place] Level minimum assist (75% patient effort)  -SP      Transfer Training PT LTG    Transfer Training PT LTG, Date Established 01/22/17  -SP      Transfer Training PT LTG, Activity Type sit to stand/stand to sit  -SP      Transfer Training PT LTG,  [place] Level minimum assist (75% patient effort)  -SP        User Key  (r) = Recorded By, (t) = Taken By, (c) = Cosigned By    Initials Name Provider Type    ALYSSA Ramirez PT Physical Therapist                Outcome Measures       01/22/17 1000          How much help from another person do you currently need...    Turning from your back to your side while in flat bed without using bedrails? 2  -SP      Moving from lying on back to sitting on the side of a flat bed without bedrails? 1  -SP      Moving to and from a bed to a chair (including a wheelchair)? 1  -SP      Standing up from a chair using your arms (e.g., wheelchair, bedside chair)? 1  -SP      Climbing 3-5 steps with a railing? 1  -SP      To walk in hospital room? 1  -SP      AM-PAC 6 Clicks Score 7  -SP      Functional Assessment    Outcome Measure Options AM-PAC 6 Clicks Basic Mobility (PT)  -SP        User Key  (r) = Recorded By, (t) = Taken By, (c) = Cosigned By    Initials Name Provider Type    ALYSSA Ramirez PT Physical Therapist           Time Calculation:         PT Charges       01/22/17 1131          Time Calculation    Start Time 1000  -SP      Stop Time 1020  -SP      Time Calculation (min) 20 min  -SP      PT - Next Appointment 01/23/17  -SP        User Key  (r) = Recorded By, (t) = Taken By, (c) = Cosigned By    Initials Name Provider Type    ALYSSA Ramirez PT Physical Therapist          Therapy Charges for Today     Code Description Service Date Service Provider Modifiers Qty    80019688382 HC PT CHNG MAIN POS CURRENT 1/22/2017 Elda Ramirez, PT GP, CN 1    71792318881 HC PT CHNG MAIN POS PROJECTED 1/22/2017 Elda Ramirez, PT GP, CK 1    70810734203 HC PT EVAL MOD COMPLEXITY 1 1/22/2017 Elda Ramirez, PT GP 1    20525244567 HC PT THER SUPP EA 15 MIN 1/22/2017 Elda Ramirez, PT GP 1    15185083194 HC PT THER PROC EA 15 MIN 1/22/2017 Elda Ramirez  PT GP 1          PT G-Codes  Outcome Measure Options: AM-PAC 6 Clicks Basic Mobility (PT)  Changing and Maintaining Body Position Current Status (): 100 percent impaired, limited or restricted  Changing and Maintaining Body Position Goal Status (): At least 40 percent but less than 60 percent impaired, limited or restricted      Elda Ramirez, PT  1/22/2017

## 2017-01-22 NOTE — PLAN OF CARE
Problem: Patient Care Overview (Adult)  Goal: Discharge Needs Assessment  Outcome: Ongoing (interventions implemented as appropriate)    01/22/17 1000 01/22/17 1210   Discharge Needs Assessment   Discharge Planning Comments --  With permission of patient spoke with wife and two sisters at bedside. Patient has been a resident at [place]. He is wheelchair bound. He does not wear oxygen at NH. He and family says the plan is back to [place] when medically ready.    Living Environment   Transportation Available none --    Self-Care   Equipment Currently Used at Home wheelchair --

## 2017-01-22 NOTE — SIGNIFICANT NOTE
01/22/17 1153   Rehab Treatment   Discipline speech language pathologist   Rehab Evaluation   Evaluation Not Performed other (see comments)  (Call placed to [facility]. Pt had recent FEES completed on 1/17/17 with recommended diet of Mechanical soft with Nectar thick liquids. Will notify MD/nursing to get diet changed. FEES report not available per nurse Almita @ [facility]. )

## 2017-01-22 NOTE — ED NOTES
Oxygen saturation dropped to 90% on room air.  Oxygen 2L/NC restarted.     Jacy Davila, RN  01/22/17 0114

## 2017-01-22 NOTE — H&P
HOSPITALIST SERVICES       @ [facility] MINNA RODRIGUEZ                Hospitalist Team    ADMISSION HISTORY AND PHYSICAL    PATIENT:      Patient Care Team:  Thomas Amos MD as PCP - General  Thomas Amos MD as PCP - Family Medicine    CHIEF COMPLAINT:     Elevated blood sugar and fever and shortness of breath    HISTORY OF PRESENT ILLNESS:    Mr. Cahnce Bocanegra is an 81 year old  male who was sent to [facility] ED with an elevated blood sugar of 560. Patient was also found to have temp of 100.5 and respiratory rate of 32 and heart rate of 131. His oxygen saturation was 90%. Patient is not a very good historian and is nonverbal. He was recently treated for pneumonia and completed Ceftin 2 days ago. And after that patient was put on Doxycycline. Patient does not seem to have chest pain, shortness of breath, abdominal pain this AM. HE is constipated with evidence of fecal impaction and had disimpaction done in ER but patient has not moved bowel. Patient does not appear to be in acute distress at this time. No other hx available from patient.                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    • GERD (gastroesophageal reflux disease)    • Hyperlipidemia    • Hypertension    • DEYSI (iron deficiency anemia)    • MRSA (methicillin resistant Staphylococcus aureus)    • Osteoarthritis    • Osteoporosis    • Pneumonia      aspiration pneumonia   • TIA (transient ischemic attack)      No past surgical history on file.  No family history on file.  Social History   Substance Use Topics   • Smoking status: Former Smoker   • Smokeless tobacco: Not on file   • Alcohol use No      Prescriptions Prior to Admission   Medication Sig Dispense Refill Last Dose   • ACETAMINOPHEN-CODEINE #3 PO Take 1 tablet by mouth 3 (Three) Times a Day.   1/21/2017 at 1400   • baclofen (LIORESAL) 10 MG tablet Take 10 mg by mouth 3 (Three) Times a Day.   1/21/2017 at 1400   • budesonide (PULMICORT) 0.5 MG/2ML nebulizer solution Take 0.5 mg by nebulization 2 (Two) Times a Day.   1/21/2017 at Unknown time   • bumetanide (BUMEX) 1 MG tablet Take 1 mg by mouth 2 (Two) Times a Day. Patient takes 2 mg in the morning at 0800; 1 mg in the afternoon at 1400   1/21/2017 at 1400   • doxycycline (VIBRAMYCIN) 100 MG capsule Take 100 mg by mouth 2 (Two) Times a Day. For treatment of RML pneumonia   1/21/2017 at 2000   • enoxaparin (LOVENOX) 80 MG/0.8ML solution syringe Inject 80 mg under the skin Every 12 (Twelve) Hours.   1/21/2017 at 0800   • famotidine (PEPCID) 20 MG tablet Take 20 mg by mouth 2 (Two) Times a Day.   1/21/2017 at 2100   • fluticasone (FLONASE) 50 MCG/ACT nasal spray 2 sprays into each nostril Daily.   1/21/2017 at 0800   • guaiFENesin (MUCINEX) 600 MG 12 hr tablet Take 600 mg by mouth 2 (Two) Times a Day.   1/21/2017 at Unknown time   • Insulin Glargine (LANTUS SOLOSTAR) 100 UNIT/ML injection pen Inject 44 Units under the skin Daily.   1/20/2017 at 0630   • Insulin Lispro (HUMALOG SC) Inject 3 Units under the skin 3 (Three) Times a Day With Meals. Hold if blood sugar is below 100   1/21/2017 at 1800   • ipratropium-albuterol (DUO-NEB) 0.5-2.5 mg/mL nebulizer Take 3 mL by nebulization Every 4 (Four) Hours As Needed for wheezing.   1/21/2017 at 1700   • isosorbide dinitrate (ISORDIL) 10 MG tablet Take 10 mg by mouth 3 (Three) Times a Day.   1/21/2017 at 1400   • montelukast (SINGULAIR) 10 MG tablet Take 10 mg by mouth Every Night.   1/20/2017 at 2000   • potassium chloride (KAYCIEL) 20 MEQ/15ML (10%) solution Take 20 mEq by mouth Daily.   1/21/2017 at 0800   • Probiotic Product (PROBIOTIC DAILY PO) Take 1  "tablet by mouth 2 (Two) Times a Day.   1/21/2017 at Unknown time   • senna (SENOKOT) 8.6 MG tablet tablet Take  by mouth Every Night.   1/21/2017 at Unknown time   • simvastatin (ZOCOR) 10 MG tablet Take 10 mg by mouth Every Night.   1/20/2017 at 2100   • SITagliptin (JANUVIA) 100 MG tablet Take 100 mg by mouth Daily.   1/21/2017 at 0800   • sodium chloride 1 G tablet Take 2 g by mouth 2 (Two) Times a Day.   1/21/2017 at 0800   • cefuroxime (CEFTIN) 250 MG tablet Take 1 tablet by mouth 2 (Two) Times a Day. 10 tablet 0    • ferrous sulfate 220 (44 FE) MG/5ML solution Take 325.6 mg by mouth Daily.   1/21/2017 at 0800   • Sennosides (SENNA) 8.8 MG/5ML syrup Take 5 mL by mouth Every Night.   1/4/2017 at 2100   • vitamin D (ERGOCALCIFEROL) 10017 UNITS capsule capsule Take 50,000 Units by mouth Every 7 (Seven) Days.   1/20/2017 at 0800     Allergies:  Flomax [tamsulosin hcl]; Penicillins; Pravachol [pravastatin]; and Prevacid [lansoprazole]    REVIEW OF SYSTEMS:  Please see the above history of present illness for pertinent positives and negatives.  The remainder of the patient's systems have been reviewed and are negative.     Vital Signs  Temp:  [97.7 °F (36.5 °C)-100.5 °F (38.1 °C)] 98.7 °F (37.1 °C)  Heart Rate:  [] 96  Resp:  [14-32] 20  BP: (122-161)/() 140/83    Flowsheet Rows         First Filed Value    Admission Height  71\" (180.3 cm) Documented at 01/21/2017 2035    Admission Weight  125 lb (56.7 kg) Documented at 01/21/2017 2151           Physical Exam:    PHYSICAL EXAMINATION:    VITAL SIGNS: As per Nurse's notes    GENERAL APPEARANCE: The patient is a well developed, well nourished,  male, in no acute distress without complaints of shortness of breath, dyspnea or acute pain. Lips and nail beds are pink.    HEENT: Normocephalic, atraumatic. PERRL. The sclerae anicteric and conjunctivae pink and moist.  The oral mucosa, hard and soft palate, tongue and posterior pharynx were normal. " "    NECK: Supple and symmetric. No masses. No thyromegaly. No tenderness. Trachea central. No carotid bruits. No evidence of JVD.    LYMPH NODES: No palpable lymphadenopathy.    SKIN: Warm, dry and intact. No rash or lesions or wounds or patechiae.    CHEST: Normal AP diameter and normal contour without any kyphoscoliosis.    LUNGS: Accessory muscles of respiration are not active. A few rales rt side. No wheezing. Respiratory expansion equal bilaterally.     CARDIOVASCULAR: RRR. S1, S2 normal without murmur/gallop/rub. No S3, S4. The carotid pulses were normal and 2+ bilaterally without bruits. Peripheral pulses were 2+ and symmetric. Capillary refill less than 3 seconds.    ABDOMEN: Soft, non-tender, non-distended. No masses. No rebound/guarding. No hepatosplenomegaly. Normal bowel sounds.     RECTAL: Not done.     EXTREMITIES:  No evidence of cyanosis, clubbing, or edema. No rash or lesions. + pedal pulses. No ulcers or varicosities identified. Pulses are 2+ throughout. No evidence of Pallor, Pulselessness, Poikilothermia (\"coldness\"), Paralysis or Paresthesia. Moves all extremities well. Negative Homans sign bilaterally.     MUSCULOSKELETAL: Has chronic backache. Muscle strength and tone normal.    PSYCHIATRY: Responds appropriately to questions. No evidence of acute anxiety, depression, panic attacks or hallucinations.    MENTAL STATUS EXAMINATION: Neatly dressed, conscious and alert. Speech normal in tone. Pleasant and cooperative. Orientation x3. Thought process, content and insight are normal. Memory without deficits.    NEUROLOGIC: Examination is grossly intact globally with no focal deficits. Patient not cooperative for detailed neurologic examination. No facial asymmetry.             Results Review:    I reviewed the patient's new clinical results.  Lab Results (most recent)     Procedure Component Value Units Date/Time    Blood Culture [[id number]] Collected:  01/21/17 2050    Specimen:  Blood from Blood, " Venous Line Updated:  01/21/17 2102    CBC & Differential [[id number]] Collected:  01/21/17 2054    Specimen:  Blood Updated:  01/21/17 2110    Narrative:       The following orders were created for panel order CBC & Differential.  Procedure                               Abnormality         Status                     ---------                               -----------         ------                     CBC Auto Differential[[id number]]         Abnormal            Final result                 Please view results for these tests on the individual orders.    CBC Auto Differential [[id number]]  (Abnormal) Collected:  01/21/17 2054    Specimen:  Blood Updated:  01/21/17 2110     WBC 17.00 (H) 10*3/mm3      RBC 5.47 10*6/mm3      Hemoglobin 15.9 g/dL      Hematocrit 48.7 %      MCV 89.0 fL      MCH 29.1 pg      MCHC 32.6 g/dL      RDW 13.2 %      RDW-SD 43.2 fl      MPV 11.6 (H) fL      Platelets 344 10*3/mm3      Neutrophil % 88.7 (H) %      Lymphocyte % 5.1 (L) %      Monocyte % 5.4 %      Eosinophil % 0.0 %      Basophil % 0.3 %      Immature Grans % 0.5 %      Neutrophils, Absolute 15.10 (H) 10*3/mm3      Lymphocytes, Absolute 0.86 10*3/mm3      Monocytes, Absolute 0.91 10*3/mm3      Eosinophils, Absolute 0.00 (L) 10*3/mm3      Basophils, Absolute 0.05 10*3/mm3      Immature Grans, Absolute 0.08 (H) 10*3/mm3      nRBC 0.0 /100 WBC     Urinalysis With / Culture If Indicated [[id number]]  (Abnormal) Collected:  01/21/17 2055    Specimen:  Urine from Urine, Catheter Updated:  01/21/17 2114     Color, UA Yellow      Appearance, UA Clear      pH, UA 6.0      Specific Gravity, UA 1.010      Glucose, UA >=1000 mg/dL (3+) (A)      Ketones, UA Negative      Bilirubin, UA Negative      Blood, UA Trace (A)      Protein, UA Negative      Leuk Esterase, UA Negative      Nitrite, UA Negative      Urobilinogen, UA 0.2 E.U./dL     Urinalysis, Microscopic Only [[id number]]  (Abnormal) Collected:  01/21/17 2055    Specimen:  Urine from Urine,  Catheter Updated:  01/21/17 2118     RBC, UA 6-12 (A) /HPF      WBC, UA 0-2 (A) /HPF      Bacteria, UA Trace (A) /HPF      Squamous Epithelial Cells, UA None Seen /HPF      Hyaline Casts, UA 13-20 /LPF      Methodology Manual Light Microscopy     Influenza Antigen [[id number]]  (Normal) Collected:  01/21/17 2055    Specimen:  Swab from Nasopharynx Updated:  01/21/17 2119     Influenza A Ag, EIA Negative      Influenza B Ag, EIA Negative     Lactic Acid, Plasma [[id number]]  (Abnormal) Collected:  01/21/17 2054    Specimen:  Blood Updated:  01/21/17 2127     Lactate 2.2 (C) mmol/L     Comprehensive Metabolic Panel [[id number]]  (Abnormal) Collected:  01/21/17 2054    Specimen:  Blood Updated:  01/21/17 2133     Glucose 564 (C) mg/dL      BUN 25 (H) mg/dL      Creatinine 1.24 mg/dL      Sodium 147 (H) mmol/L      Potassium 4.4 mmol/L      Chloride 104 mmol/L      CO2 25.8 mmol/L      Calcium 9.8 mg/dL      Total Protein 8.2 g/dL      Albumin 3.70 g/dL      ALT (SGPT) 12 U/L      AST (SGOT) 13 U/L      Alkaline Phosphatase 181 (H) U/L      Total Bilirubin 0.5 mg/dL      eGFR Non African Amer 56 (L) mL/min/1.73      Globulin 4.5 gm/dL      A/G Ratio 0.8 g/dL      BUN/Creatinine Ratio 20.2      Anion Gap 17.2 mmol/L     Narrative:       The MDRD GFR formula is only valid for adults with stable renal function between ages 18 and 70.    Blood Culture [[id number]] Collected:  01/21/17 2211    Specimen:  Blood from Blood, Venous Line Updated:  01/21/17 2216    POC Glucose Fingerstick [[id number]]  (Abnormal) Collected:  01/21/17 2243    Specimen:  Blood Updated:  01/21/17 2249     Glucose 303 (H) mg/dL     Narrative:       Meter: [id number] : [id number] Te Lezama    Hepatitis B Surface Antigen [[id number]] Collected:  01/21/17 2316    Specimen:  Blood Updated:  01/21/17 2316    Hepatitis C Antibody [[id number]] Collected:  01/21/17 2316    Specimen:  Blood Updated:  01/21/17 2316    HIV-1 / O / 2 Ag / Antibody 4th Generation  [[id number]]  (Normal) Collected:  01/21/17 2314    Specimen:  Blood Updated:  01/21/17 2348     HIV-1/ HIV-2 Negative      HIV-1 P24 Ag Screen Non-Reactive     Lactate Acid, Reflex [[id number]]  (Normal) Collected:  01/22/17 0108    Specimen:  Blood Updated:  01/22/17 0123     Lactate 1.8 mmol/L     POC Glucose Fingerstick [[id number]]  (Abnormal) Collected:  01/22/17 0216    Specimen:  Blood Updated:  01/22/17 0224     Glucose 198 (H) mg/dL     Narrative:       Meter: [id number] : [id number] Naomi Machuca    Procalcitonin [[id number]] Collected:  01/22/17 0549    Specimen:  Blood Updated:  01/22/17 0549    Lactic Acid, Plasma [[id number]]  (Normal) Collected:  01/22/17 0549    Specimen:  Blood Updated:  01/22/17 0633     Lactate 1.4 mmol/L     Magnesium [[id number]]  (Normal) Collected:  01/22/17 0549    Specimen:  Blood Updated:  01/22/17 0647     Magnesium 2.3 mg/dL     CBC & Differential [[id number]] Collected:  01/22/17 0549    Specimen:  Blood Updated:  01/22/17 0700    Narrative:       The following orders were created for panel order CBC & Differential.  Procedure                               Abnormality         Status                     ---------                               -----------         ------                     CBC Auto Differential[[id number]]         Abnormal            Final result                 Please view results for these tests on the individual orders.    CBC Auto Differential [[id number]]  (Abnormal) Collected:  01/22/17 0549    Specimen:  Blood Updated:  01/22/17 0700     WBC 14.41 (H) 10*3/mm3      RBC 4.59 (L) 10*6/mm3      Hemoglobin 13.4 (L) g/dL      Hematocrit 41.9 (L) %      MCV 91.3 fL      MCH 29.2 pg      MCHC 32.0 g/dL      RDW 13.3 %      RDW-SD 44.7 fl      MPV 11.4 (H) fL      Platelets 268 10*3/mm3      Neutrophil % 80.4 (H) %      Lymphocyte % 12.4 (L) %      Monocyte % 6.4 %      Eosinophil % 0.1 %      Basophil % 0.3 %      Immature Grans % 0.4 %      Neutrophils, Absolute 11.59  (H) 10*3/mm3      Lymphocytes, Absolute 1.79 10*3/mm3      Monocytes, Absolute 0.92 10*3/mm3      Eosinophils, Absolute 0.01 (L) 10*3/mm3      Basophils, Absolute 0.04 10*3/mm3      Immature Grans, Absolute 0.06 (H) 10*3/mm3      nRBC 0.0 /100 WBC     Comprehensive Metabolic Panel [[id number]]  (Abnormal) Collected:  01/22/17 0549    Specimen:  Blood Updated:  01/22/17 0700     Glucose 195 (H) mg/dL      BUN 19 mg/dL      Creatinine 0.91 mg/dL      Sodium 154 (H) mmol/L      Potassium 3.5 mmol/L      Chloride 113 (H) mmol/L      CO2 30.0 (H) mmol/L      Calcium 9.0 mg/dL      Total Protein 6.5 g/dL      Albumin 3.00 (L) g/dL      ALT (SGPT) 9 U/L      AST (SGOT) 13 U/L      Alkaline Phosphatase 137 (H) U/L      Total Bilirubin 0.3 mg/dL      eGFR Non African Amer 80 mL/min/1.73      Globulin 3.5 gm/dL      A/G Ratio 0.9 g/dL      BUN/Creatinine Ratio 20.9      Anion Gap 11.0 mmol/L     Narrative:       The MDRD GFR formula is only valid for adults with stable renal function between ages 18 and 70.          Imaging Results (most recent)     Procedure Component Value Units Date/Time    XR Chest 1 View [[id number]] Resulted:  01/21/17 2228     Updated:  01/21/17 2229    CT Abdomen Pelvis With Contrast [[id number]] Resulted:  01/22/17 0009     Updated:  01/22/17 0009        reviewed    ECG/EMG Results (most recent)     None        not reviewed    Assessment/Plan           ASSESSMENT AND PLAN:       SUMMARY:    ?   PROPHYLAXIS:   -Oxygen Saturation: As per Nurses' notes.   -DVT Prophylaxis: On Inj. Lovenox   -Gastritis Prophylaxis: On Famotidine  -Constipation Prophylaxis: colace 100 mg po BID   -Immunizations: N/A  -Intake and Output Orders: As per order sheet   -Perry Catheter: Not indicated at this time  -Smoking/ Nicotine issues: N/A    PAIN MANAGEMENT:  -Pain Management: On Tylenol #3   -Miscellaneous Medications: Tylenol 650 mg 1 po q4-6 hours for headache or temp >100 degrees     ACTIVITIES:  -Bathroom Privileges: May  use bathroom   -Activity: Encouraged to sit up in side chair for 2-4 hours BID   -PT/OT: Physical THerapy consult has been requested for evaluation and ROM exercises     NUTRITION AND FLUIDS:  -Diet/ Nutrition: Regular, cardiac diet   -Fluid Status/Electrolytes: D5W 100 mL/Hr      SOCIAL ISSUES:   -MRSA SCREEN: As per institutional protocol; Hx of positive MRSA in past  -Behavioral/ Agitation Issues: NONE   -Social Issues: Resides in NH   -Occupational Issues: Patient is Retired.   -Code Status: DNR on admission   -Disposition: Should be able to go back to NH once ready for discharge    THERAPEUTIC:   ALLERGIES: Flomax, PCN, Pravachol, Prevacid   ANTIBIOTICS: Inj Vanco,  Inj Levaquin and Inj Cefepime  INSULIN THERAPY: Low dose insulin coverage as ordered   CHEST PAIN: NTG 0.4 MG s/l at onset of chest pain; Repeat q5 min x 2 PRN   NEBULIZER TREATMENT: DuoNeb 3 ml via nebulizer q4-6 hrs PRN for shortness of   breath and/or wheezing   ANXIETY: N/A    DEPRESSION: N/A    INSOMNIA: N/A             PRIMARY DIAGNOSES:    1) RLL Pneumonia; Patient apparently was recently treated for pneumonia. Has been put on  Inj Vanco,  Inj Levaquin and Inj Cefepime at this time    2) Sepsis: His lactic acid level is 1.4 this morning    3) Hypernatremia: I am concerned about his serum Na 154 this AM. Will put him on D5W. I will also put him on insulin coverage    3) Urinary retention: Patient has indwelling Perry    4) BPH: As above. Is allergic to Flomax    5) Hyperglycemia: Blood sugars fine. On Lantus Insulin and Januvia. Will also put on low dose insulin coverage    6) Fecal Impaction and Constipation: Since patient has been disimpacted in ER, he has not moved bowels. Will order Fleet enema    7) DNR status: After reviewing paperwork, appropriate orders entered    8) HTN: Last BP is 140/83    9) Hyperlipidemia: On Atorvastatin    10) CAD: ON Isosorbide and NTG    11) CHF: Hx noted    12) COPD/ Asthma: On Pulmicort, Singulair and  DuoNeb    13) Vitamin D deficiency: On replacement    14) DJD: Patient takes Tylenol #3 for pain control    15) GERD: is on Famotidine    16) Osteoporosis: Hx noted    17) Anemia: On chronic oral iron     18) DVT Prophylaxis: On Inj Lovenox  ?     SECONDARY DIAGNOSES:  ?     Hx of TIA, Hx of Aspiration pneumonia, Hx of MRSA positive in past, Hx of Femoral DVT        SURGICAL DIAGNOSES:        No surgical hx available              PLAN:    Labs and diagnostic tests reviewed: Gluc 195, Na 154, K 3.5, CO2 30.0, AG 11.0, Cr 0.91, Venous Lactate 1.8 to 1.4, WBC 14.41, Hb 13.4, 80.4N, 12.4L, HIV Neg    Diagnostic tests reviewed:  CT scan abdomen: Impacted stool  CXR: RLL Pneumonia      Consultation: Will also request Pulmonary consultation    Patient is clinically and hemodynamically stable      Any new recommendations: Has not moved bowels despite disimpaction. Will order Fleet enema today    New Labs ordered: CBC, CMP and Lactic acid for tomorrow AM    New diagnostic tests ordered: N/A    Any changes in medications: Patient is hypernatremic and is receiving saline infusion. Will D/C saline and give D5W and will monitor blood sugars    To continue current management and supportive care    Pain management issues: On Tylenol#3    Discharge planning issues: Patient should be able to go back to NH once ready for discharge    Will follow patient closely    Nothing new to add for right now        I discussed the patients findings and my recommendations with patient.     Shailesh Worthy MD  01/22/17  7:03 AM          Shailesh Worthy M.D., FACP  Internal Medicine/ Hospitalist        Time:       EMR Dragon/Transcription disclaimer:      Much of this encounter note is an electronic transcription/translation of spoken language to printed text. The electronic translation of spoken language may permit erroneous, or at times, nonsensical words or phrases to be inadvertently transcribed; Although I have reviewed the note for such  errors, some may still exist.

## 2017-01-22 NOTE — PROGRESS NOTES
Acute Care - Speech Language Pathology   Swallow Initial Evaluation MARIS Barber     Patient Name: Chance Bocanegra  : 1935  MRN: [id number]  Today's Date: 2017  Onset of Illness/Injury or Date of Surgery Date: 17     Referring Physician: Donato Alvarado      Admit Date: 2017    Visit Dx:     ICD-10-CM ICD-9-CM   1. Pneumonia of right lower lobe due to infectious organism J18.9 483.8   2. Sepsis, due to unspecified organism A41.9 038.9     995.91   3. Urinary retention R33.9 788.20   4. Prostatic hypertrophy N40.0 600.90   5. Hyperglycemia R73.9 790.29   6. Fecal impaction K56.41 560.32   7. 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       SWALLOW EVALUATION (last 72 hours)      Swallow Evaluation       17 1312    Rehab Evaluation    Document Type evaluation  -AD    Subjective Information no complaints;agree to therapy  -AD    Patient Effort, Rehab Treatment Comment Alert and cooperative, but confused. Limited verbal and limited ability to follow commands  -AD    Symptoms Noted During/After Treatment none  -AD    General Information    Patient Profile Review yes  -AD    Onset of Illness/Injury 17  -AD     Referring Physician Donato Alvarado  -AD    Subjective Patient Observations Pt was seen at bedside, upright during lunch. Family present and providing history for part of the evaluation.   -AD    Pertinent History Of Current Problem Pt admitted with RLL pneumonia, elevated blood sugar, and fecal impaction. Found to be septic and suspected aspiration. Hx of aspiration pneumonia per [facility] records. He recently had a FEES study completed on 1/17/17 and his diet was changed to Mechanical Soft with nectar thick liquids. His diet was also changed here this am. Pt also with a history of dementia, GERD, T12  compression fracture, TIA and recent pneumonia.   -AD    Current Diet Limitations nectar thick liquids;mechanical soft  -AD    Precautions/Limitations, Vision --   vision problems reported but level unknown  -AD    Precautions/Limitations, Hearing --   Unable to fully assess as pt w/advancing dementia  -AD    Prior Level of Function- Communication limited to basic wants/needs  -AD    Prior Level of Function- Swallowing dietary restrictions (e.g. consistent carb, low salt, etc);diet modifications- liquid;diet modifications- foods;concerns regarding dehydration;concerns regarding malnutrition;esophageal concerns;other (comment)   cardiac, consistent carbs  -AD    Plans/Goals Discussed With patient and family;agreed upon   pt does not demonstrate understanding at this time  -AD    Barriers to Rehab previous functional deficit;cognitive status;visual deficit  -AD    Clinical Impression    Patient's Goals For Discharge patient could not state  -AD    Family Goals For Discharge patient able to return to all previous activities/roles  -AD    SLP Swallowing Diagnosis mild dysphagia;moderate dysphagia;oral dysfunction;pharyngeal dysfunction  -AD    Criteria for Skilled Therapeutic Interventions Met no significant expected improvement in functional status  -AD    FCM, Swallowing 4-->Level 4  -AD    Therapy  Frequency evaluation only  -AD    SLP Diet Recommendation soft textures;ground;IV - mechanical soft, no mixed consistencies;nectar/syrup-thick liquids  -AD    Recommended Diagnostics other (see comments)   need to obtain copy of FEES from [facility]  -    Recommended Feeding/Eating Techniques maintain upright posture during/after eating for 30 mins;small sips/bites;multiple swallow technique  -AD    SLP Rec. for Method of Medication Administration meds whole in pudding/applesauce  -AD    Monitor For Signs Of Aspiration right lower lobe infiltrates   unknown if silent aspiration or not  -AD    Anticipated Discharge Disposition skilled nursing facility   [facility]  -    Pain Assessment    Pain Assessment No/denies pain   Per nurse complained prior to eval. Pain meds given.  -AD    Cognitive Assessment/Intervention    Current Cognitive/Communication Assessment impaired  -AD    Orientation Status oriented to;disoriented to;place;time;situation   name  -AD    Follows Commands/Answers Questions able to follow single-step instructions;25% of the time  -AD    Oral Motor Structure and Function    Oral Motor Anatomy and Physiology --   difficult to assess  -AD    Dentition Assessment upper dentures/partial in place;lower dentures/partial in place;poor oral hygiene;other (see comments)   Oral care provided. Dentures w/caked material/food  -AD    Secretion Management WNL/WFL  -AD    Mucosal Quality moist, healthy  -AD    Velar Elevation --   unable to assess as pt unable to follow commands  -AD    Gag Response hyperactive;other (see comments)   gags w/oral care to palate  -AD    Volitional Swallow unable to initiate volitional swallow  -AD    Volitional Cough absent volitional cough  -AD    Oral Musculature General Assessment lingual impairment  -AD    Lingual Strength and Mobility reduced ROM;other (see comments)   deviates to the right w/protrusion. Unable to assess further  -AD    General  Feeding/Swallowing Observations    Current Feeding Method oral feeding methods  -AD    Respiratory Support Currently in Use nasal cannula in use  -AD    Observations of Self Feeding Skills appropriate self feeding skills observed  -AD    Observations of Posture During Feeding Upright in bed at 45 degree angle. Raised to 80 degrees  -AD    SpO2 Level Following Swallow 94  -AD    Clinical Swallow Exam    Mode of Presentation self fed;spoon;straw  -AD    Oral Preparation Concerns cohesive solid:;excess chewing noted;increased prep time  -AD    Oral Transit Concerns cohesive solid:;increased oral transit time  -AD    Oral Residue cohesive solid:;residue throughout oral cavity  -AD    Oral Phase Results impaired oral phase, signs of dysfunction present  -AD    Pharyngeal Phase Results multiple swallows;throat clear;susupected impaired pharyngeal phase of swallowing   Cough not directly w/swallow ? pneum or pen/asp?  -AD    Summary of Clinical Exam Pt presents with a mild to moderate oropharyngeal dysphagia. Hx of aspiration pneumonia. Recent eval with diet change to [facility] soft with nectar. He demonstrates continued signs of dysphagia with multiple swallows, cough and oral residue throughout the oral cavity. Pt does clear some on his own. Needs assistance with oral care and denture care. Caked food on upper and lower plate. Rec to continue with current diet and obtain records from [place] re: recent FEES.   -AD    Swallow Recommendations    Eating Assistance requires caregiver to assist with positioning during eating;other (see comments)   assist with setup  -AD    Oral Care oral care with toothbrush and dentifrice before breakfast and after meals and PRN  -AD    Modified Eating Strategies upright positioning 90 degrees;reflux precautions  -AD    Other Recommendations meds whole;other (comment)   in puree  -AD    Recommended Diet soft textures;ground;nectar/syrup-thick liquids;IV - mechanical soft, no mixed consistencies   -AD    Positioning and Restraints    Pre-Treatment Position in bed  -AD    Post Treatment Position bed  -AD    In Bed call light within reach;encouraged to call for assist;side rails up x2  -AD      User Key  (r) = Recorded By, (t) = Taken By, (c) = Cosigned By    Initials Name Effective Dates    SOL Jaffe, MS CCC-SLP 06/22/16 -         EDUCATION  The patient has been educated in the following areas:   Dysphagia (Swallowing Impairment) Oral Care/Hydration Modified Diet Instruction Positioning. Spoke with sister in laws. Also provided education to nurse, Kendra, and CNA, Melinda. Family demonstrates understanding of swallow eval and diet recommendations as well as symptoms related to dysphagia. Also covered oral care and positioning with staff.    SLP Recommendation and Plan  SLP Swallowing Diagnosis: mild dysphagia, moderate dysphagia, oral dysfunction, pharyngeal dysfunction  SLP Diet Recommendation: soft textures, ground, IV - mechanical soft, no mixed consistencies, nectar/syrup-thick liquids  Recommended Feeding/Eating Techniques: maintain upright posture during/after eating for 30 mins, small sips/bites, multiple swallow technique  SLP Rec. for Method of Medication Administration: meds whole in pudding/applesauce  Monitor For Signs Of Aspiration: right lower lobe infiltrates (unknown if silent aspiration or not)  Recommended Diagnostics: other (see comments) (need to obtain copy of FEES from [facility])  Criteria for Skilled Therapeutic Interventions Met: no significant expected improvement in functional status  Anticipated Discharge Disposition: skilled nursing facility ([facility])  Therapy Frequency: evaluation only     Plan of Care Review  Plan Of Care Reviewed With: family (Nursing staff)  Outcome Summary/Follow up Plan: Clinical Swallow Eval: Pt with mild- moderate oropharyngeal dysphagia per eval and diet. Recent FEES completed at [place] with change in diet. Agree with  mech soft with nectar thick liquids. Rec upright positioning during all po and 30 minutes after due to hx of reflux, oral care before breakfast and after all meals and prn as needed. Also further recommend to soak dentures at night due to poor oral condition during the evaluation. Will follow up for report from Julio before making further recommendations. See report.     Time Calculation:         Time Calculation- SLP       01/22/17 1406          Time Calculation- SLP    SLP Start Time 1218  -AD      SLP Stop Time 1312  -AD      SLP Time Calculation (min) 54 min  -AD      Total Timed Code Minutes- SLP 0 minute(s)  -AD      SLP Non-Billable Time (min) 0 min  -AD      TCU Minutes- SLP 0 min  -AD      SLP Received On 01/22/17  -AD        User Key  (r) = Recorded By, (t) = Taken By, (c) = Cosigned By    Initials Name Provider Type    AD Jamia Jaffe, MS CCC-SLP Speech Therapist          Therapy Charges for Today     Code Description Service Date Service Provider Modifiers Qty    [id number] HC ST EVAL ORAL PHARYNG SWALLOW 4 1/22/2017 Jamia Jaffe MS CCC-SLP GN 1        Jamia Jaffe MS CCC-SLP  1/22/2017

## 2017-01-23 LAB
ALBUMIN SERPL-MCNC: 2.3 G/DL (ref 3.5–5.2)
ALBUMIN/GLOB SERPL: 0.7 G/DL
ALP SERPL-CCNC: 106 U/L (ref 40–129)
ALT SERPL W P-5'-P-CCNC: 6 U/L (ref 5–41)
ANION GAP SERPL CALCULATED.3IONS-SCNC: 8.7 MMOL/L
AST SERPL-CCNC: 12 U/L (ref 5–40)
BASOPHILS # BLD AUTO: 0.03 10*3/MM3 (ref 0–0.2)
BASOPHILS NFR BLD AUTO: 0.3 % (ref 0–2)
BILIRUB SERPL-MCNC: 0.4 MG/DL (ref 0.2–1.2)
BUN BLD-MCNC: 12 MG/DL (ref 8–23)
BUN/CREAT SERPL: 15.8 (ref 7–25)
CALCIUM SPEC-SCNC: 8.1 MG/DL (ref 8.8–10.5)
CHLORIDE SERPL-SCNC: 108 MMOL/L (ref 98–107)
CO2 SERPL-SCNC: 26.3 MMOL/L (ref 22–29)
CREAT BLD-MCNC: 0.76 MG/DL (ref 0.76–1.27)
D-LACTATE SERPL-SCNC: 0.8 MMOL/L (ref 0.5–2)
DEPRECATED RDW RBC AUTO: 45.3 FL (ref 37–54)
EOSINOPHIL # BLD AUTO: 0.35 10*3/MM3 (ref 0.1–0.3)
EOSINOPHIL NFR BLD AUTO: 3.3 % (ref 0–4)
ERYTHROCYTE [DISTWIDTH] IN BLOOD BY AUTOMATED COUNT: 13.4 % (ref 11.5–14.5)
GFR SERPL CREATININE-BSD FRML MDRD: 98 ML/MIN/1.73
GLOBULIN UR ELPH-MCNC: 3.2 GM/DL
GLUCOSE BLD-MCNC: 92 MG/DL (ref 65–99)
GLUCOSE BLDC GLUCOMTR-MCNC: 115 MG/DL (ref 70–130)
GLUCOSE BLDC GLUCOMTR-MCNC: 193 MG/DL (ref 70–130)
GLUCOSE BLDC GLUCOMTR-MCNC: 237 MG/DL (ref 70–130)
GLUCOSE BLDC GLUCOMTR-MCNC: 511 MG/DL (ref 70–130)
GLUCOSE BLDC GLUCOMTR-MCNC: 67 MG/DL (ref 70–130)
GLUCOSE BLDC GLUCOMTR-MCNC: 76 MG/DL (ref 70–130)
HCT VFR BLD AUTO: 37.2 % (ref 42–52)
HCV AB SER DONR QL: NORMAL
HGB BLD-MCNC: 11.7 G/DL (ref 14–18)
IMM GRANULOCYTES # BLD: 0.03 10*3/MM3 (ref 0–0.03)
IMM GRANULOCYTES NFR BLD: 0.3 % (ref 0–0.5)
LYMPHOCYTES # BLD AUTO: 2.16 10*3/MM3 (ref 0.6–4.8)
LYMPHOCYTES NFR BLD AUTO: 20.4 % (ref 20–45)
MCH RBC QN AUTO: 28.8 PG (ref 27–31)
MCHC RBC AUTO-ENTMCNC: 31.5 G/DL (ref 31–37)
MCV RBC AUTO: 91.6 FL (ref 80–94)
MONOCYTES # BLD AUTO: 0.65 10*3/MM3 (ref 0–1)
MONOCYTES NFR BLD AUTO: 6.1 % (ref 3–8)
NEUTROPHILS # BLD AUTO: 7.37 10*3/MM3 (ref 1.5–8.3)
NEUTROPHILS NFR BLD AUTO: 69.6 % (ref 45–70)
NRBC BLD MANUAL-RTO: 0 /100 WBC (ref 0–0)
PLATELET # BLD AUTO: 196 10*3/MM3 (ref 140–500)
PMV BLD AUTO: 11.5 FL (ref 7.4–10.4)
POTASSIUM BLD-SCNC: 3.3 MMOL/L (ref 3.5–5.2)
PROT SERPL-MCNC: 5.5 G/DL (ref 6–8.5)
RBC # BLD AUTO: 4.06 10*6/MM3 (ref 4.7–6.1)
SODIUM BLD-SCNC: 143 MMOL/L (ref 136–145)
VANCOMYCIN SERPL-MCNC: 12.1 MCG/ML
WBC NRBC COR # BLD: 10.59 10*3/MM3 (ref 4.8–10.8)

## 2017-01-23 PROCEDURE — 25010000002 VANCOMYCIN PER 500 MG: Performed by: INTERNAL MEDICINE

## 2017-01-23 PROCEDURE — 25010000002 ENOXAPARIN PER 10 MG: Performed by: INTERNAL MEDICINE

## 2017-01-23 PROCEDURE — 94640 AIRWAY INHALATION TREATMENT: CPT

## 2017-01-23 PROCEDURE — 97110 THERAPEUTIC EXERCISES: CPT

## 2017-01-23 PROCEDURE — 25010000002 VANCOMYCIN 750 MG RECONSTITUTED SOLUTION 1 EACH VIAL: Performed by: INTERNAL MEDICINE

## 2017-01-23 PROCEDURE — 80202 ASSAY OF VANCOMYCIN: CPT | Performed by: INTERNAL MEDICINE

## 2017-01-23 PROCEDURE — 80053 COMPREHEN METABOLIC PANEL: CPT | Performed by: INTERNAL MEDICINE

## 2017-01-23 PROCEDURE — 63710000001 INSULIN ASPART PER 5 UNITS: Performed by: INTERNAL MEDICINE

## 2017-01-23 PROCEDURE — 82962 GLUCOSE BLOOD TEST: CPT

## 2017-01-23 PROCEDURE — 83605 ASSAY OF LACTIC ACID: CPT | Performed by: INTERNAL MEDICINE

## 2017-01-23 PROCEDURE — 85025 COMPLETE CBC W/AUTO DIFF WBC: CPT | Performed by: INTERNAL MEDICINE

## 2017-01-23 PROCEDURE — 25010000002 LEVOFLOXACIN PER 250 MG: Performed by: INTERNAL MEDICINE

## 2017-01-23 PROCEDURE — 99232 SBSQ HOSP IP/OBS MODERATE 35: CPT | Performed by: HOSPITALIST

## 2017-01-23 PROCEDURE — 63710000001 INSULIN DETEMIR PER 5 UNITS: Performed by: INTERNAL MEDICINE

## 2017-01-23 PROCEDURE — 97166 OT EVAL MOD COMPLEX 45 MIN: CPT

## 2017-01-23 RX ORDER — VANCOMYCIN HYDROCHLORIDE
1250 EVERY 12 HOURS
Status: DISCONTINUED | OUTPATIENT
Start: 2017-01-23 | End: 2017-01-23 | Stop reason: CLARIF

## 2017-01-23 RX ORDER — POLYETHYLENE GLYCOL 3350 17 G/17G
17 POWDER, FOR SOLUTION ORAL DAILY
Status: DISCONTINUED | OUTPATIENT
Start: 2017-01-24 | End: 2017-01-26 | Stop reason: HOSPADM

## 2017-01-23 RX ORDER — ARFORMOTEROL TARTRATE 15 UG/2ML
15 SOLUTION RESPIRATORY (INHALATION)
Status: DISCONTINUED | OUTPATIENT
Start: 2017-01-23 | End: 2017-01-26 | Stop reason: HOSPADM

## 2017-01-23 RX ORDER — POTASSIUM CHLORIDE 20MEQ/15ML
40 LIQUID (ML) ORAL ONCE
Status: COMPLETED | OUTPATIENT
Start: 2017-01-23 | End: 2017-01-23

## 2017-01-23 RX ADMIN — FAMOTIDINE 20 MG: 20 TABLET, FILM COATED ORAL at 08:30

## 2017-01-23 RX ADMIN — BUDESONIDE 0.5 MG: 0.5 SUSPENSION RESPIRATORY (INHALATION) at 19:48

## 2017-01-23 RX ADMIN — BACLOFEN 10 MG: 10 TABLET ORAL at 21:44

## 2017-01-23 RX ADMIN — INSULIN ASPART 3 UNITS: 100 INJECTION, SOLUTION INTRAVENOUS; SUBCUTANEOUS at 12:01

## 2017-01-23 RX ADMIN — SITAGLIPTIN 100 MG: 100 TABLET, FILM COATED ORAL at 08:30

## 2017-01-23 RX ADMIN — ISOSORBIDE DINITRATE 10 MG: 10 TABLET ORAL at 21:44

## 2017-01-23 RX ADMIN — ARFORMOTEROL TARTRATE 15 MCG: 15 SOLUTION RESPIRATORY (INHALATION) at 19:48

## 2017-01-23 RX ADMIN — BACLOFEN 10 MG: 10 TABLET ORAL at 08:31

## 2017-01-23 RX ADMIN — ENOXAPARIN SODIUM 80 MG: 80 INJECTION SUBCUTANEOUS at 21:44

## 2017-01-23 RX ADMIN — DEXTROSE MONOHYDRATE 100 ML/HR: 50 INJECTION, SOLUTION INTRAVENOUS at 18:15

## 2017-01-23 RX ADMIN — ATORVASTATIN CALCIUM 20 MG: 20 TABLET, FILM COATED ORAL at 08:31

## 2017-01-23 RX ADMIN — VANCOMYCIN HYDROCHLORIDE 1250 MG: 500 INJECTION, POWDER, LYOPHILIZED, FOR SOLUTION INTRAVENOUS at 15:21

## 2017-01-23 RX ADMIN — Medication 1 TABLET: at 17:33

## 2017-01-23 RX ADMIN — INSULIN ASPART 3 UNITS: 100 INJECTION, SOLUTION INTRAVENOUS; SUBCUTANEOUS at 11:55

## 2017-01-23 RX ADMIN — BUDESONIDE 0.5 MG: 0.5 SUSPENSION RESPIRATORY (INHALATION) at 12:20

## 2017-01-23 RX ADMIN — GUAIFENESIN 600 MG: 600 TABLET, EXTENDED RELEASE ORAL at 17:33

## 2017-01-23 RX ADMIN — DOCUSATE SODIUM 100 MG: 100 CAPSULE, LIQUID FILLED ORAL at 17:33

## 2017-01-23 RX ADMIN — INSULIN DETEMIR 23 UNITS: 100 INJECTION, SOLUTION SUBCUTANEOUS at 08:44

## 2017-01-23 RX ADMIN — FERROUS SULFATE TAB EC 324 MG (65 MG FE EQUIVALENT) 324 MG: 324 (65 FE) TABLET DELAYED RESPONSE at 08:31

## 2017-01-23 RX ADMIN — POTASSIUM CHLORIDE 40 MEQ: 1.5 SOLUTION ORAL at 23:37

## 2017-01-23 RX ADMIN — INSULIN ASPART 2 UNITS: 100 INJECTION, SOLUTION INTRAVENOUS; SUBCUTANEOUS at 21:43

## 2017-01-23 RX ADMIN — GUAIFENESIN 600 MG: 600 TABLET, EXTENDED RELEASE ORAL at 08:31

## 2017-01-23 RX ADMIN — DEXTROSE MONOHYDRATE 100 ML/HR: 50 INJECTION, SOLUTION INTRAVENOUS at 08:20

## 2017-01-23 RX ADMIN — INSULIN DETEMIR 23 UNITS: 100 INJECTION, SOLUTION SUBCUTANEOUS at 21:43

## 2017-01-23 RX ADMIN — Medication 1 TABLET: at 08:31

## 2017-01-23 RX ADMIN — AZTREONAM 2 G: 2 INJECTION, POWDER, LYOPHILIZED, FOR SOLUTION INTRAMUSCULAR; INTRAVENOUS at 17:46

## 2017-01-23 RX ADMIN — DOCUSATE SODIUM 100 MG: 100 CAPSULE, LIQUID FILLED ORAL at 08:31

## 2017-01-23 RX ADMIN — SENNOSIDES 8.6 MG: 8.6 TABLET, FILM COATED ORAL at 17:33

## 2017-01-23 RX ADMIN — SODIUM CHLORIDE 1000 MG: 900 INJECTION, SOLUTION INTRAVENOUS at 01:06

## 2017-01-23 RX ADMIN — MONTELUKAST SODIUM 10 MG: 10 TABLET, FILM COATED ORAL at 21:44

## 2017-01-23 RX ADMIN — AZTREONAM 2 G: 2 INJECTION, POWDER, LYOPHILIZED, FOR SOLUTION INTRAMUSCULAR; INTRAVENOUS at 03:11

## 2017-01-23 RX ADMIN — FLUTICASONE PROPIONATE 2 SPRAY: 50 SPRAY, METERED NASAL at 08:30

## 2017-01-23 RX ADMIN — FERROUS SULFATE TAB EC 324 MG (65 MG FE EQUIVALENT) 324 MG: 324 (65 FE) TABLET DELAYED RESPONSE at 17:33

## 2017-01-23 RX ADMIN — ENOXAPARIN SODIUM 80 MG: 80 INJECTION SUBCUTANEOUS at 08:30

## 2017-01-23 RX ADMIN — BACLOFEN 10 MG: 10 TABLET ORAL at 15:35

## 2017-01-23 RX ADMIN — LEVOFLOXACIN 500 MG: 500 INJECTION, SOLUTION INTRAVENOUS at 03:11

## 2017-01-23 RX ADMIN — AZTREONAM 2 G: 2 INJECTION, POWDER, LYOPHILIZED, FOR SOLUTION INTRAMUSCULAR; INTRAVENOUS at 11:12

## 2017-01-23 RX ADMIN — POTASSIUM CHLORIDE 20 MEQ: 1.5 POWDER, FOR SOLUTION ORAL at 08:30

## 2017-01-23 RX ADMIN — SENNOSIDES 8.6 MG: 8.6 TABLET, FILM COATED ORAL at 08:30

## 2017-01-23 RX ADMIN — ISOSORBIDE DINITRATE 10 MG: 10 TABLET ORAL at 08:31

## 2017-01-23 RX ADMIN — ISOSORBIDE DINITRATE 10 MG: 10 TABLET ORAL at 15:35

## 2017-01-23 RX ADMIN — FAMOTIDINE 20 MG: 20 TABLET, FILM COATED ORAL at 17:33

## 2017-01-23 NOTE — PROGRESS NOTES
Patient was on Vancomycin 1000mg IV q12h.  Random Vanc level= 12.1.  Will increase to 1250mg IV q12h.  Pharmacy will  continue to follow while on Vancomycin.    Jillian Jeff, PharmD  1/23/2017  12:39 PM

## 2017-01-23 NOTE — PLAN OF CARE
Problem: Patient Care Overview (Adult)  Goal: Plan of Care Review  Outcome: Ongoing (interventions implemented as appropriate)    01/23/17 0208   Coping/Psychosocial Response Interventions   Plan Of Care Reviewed With patient   Patient Care Overview   Progress no change         Problem: Pneumonia (Adult)  Intervention: Maximize Oxygenation/Ventilation/Perfusion    01/23/17 0208   Activity   Activity Type activity adjusted per tolerance   Promote Aggressive Pulmonary Hygiene/Secretion Management   Cough And Deep Breathing done with encouragement   Respiratory Interventions   Airway/Ventilation Management pulmonary hygiene promoted         Goal: Signs and Symptoms of Listed Potential Problems Will be Absent or Manageable (Pneumonia)    01/23/17 0208   Pneumonia   Problems Assessed (Pneumonia) respiratory compromise   Problems Present (Pneumonia) respiratory compromise

## 2017-01-23 NOTE — CONSULTS
"Adult Nutrition  Assessment/PES    Patient Name:  Chance Bocanegra  YOB: 1935  MRN: [id number]  Admit Date:  1/21/2017    Assessment Date:  1/23/2017        Reason for Assessment       01/23/17 1118    Reason for Assessment    Reason For Assessment/Visit nurse/nurse practitioner consult    Identified At Risk By Screening Criteria MST SCORE 2+    Diagnosis --   Hyperglycemia, SOA, Fecal impaction, dementia, hx CHF              Nutrition/Diet History       01/23/17 1120    Nutrition/Diet History    Typical Food/Fluid Intake Spoke with pt briefly, denies any food allergies, when asked about appetite states I fall asleep. Pt drowsy during visit. When asked again states I usually do pretty good. Noted confusion.             Anthropometrics       01/23/17 1121    Anthropometrics    RD Documented Current Weight  72.4 kg (159 lb 9.8 oz)    Anthropometrics (Special Considerations)    RD Calculated BMI (kg/m2) 22.2    Body Mass Index (BMI)    BMI Grade 19.1 - 24.9 - normal            Labs/Tests/Procedures/Meds       01/23/17 1122    Labs/Tests/Procedures/Meds    Labs/Tests Review Reviewed;K+    Medication Review Reviewed, pertinent;Antibiotic;Diabetic Oral Agent;Insulin;Antacid;Anticoag;Iron   bacid, pepcid, colace               Estimated/Assessed Needs       01/23/17 1123    Calculation Measurements    Weight Used For Calculations 72.4 kg (159 lb 9.8 oz)    Height Used for Calculations 1.803 m (5' 10.98\")    Estimated/Assessed Energy Needs    Energy Need Method [facility]    Age 81    R[facility] ([facility]) 1450.87    Activity Factors ([facility])  Confined to bed  1.2    Estimated Kcal Range  1740 kcal    195 gm CHO, 45% calories    Estimated/Assessed Protein Needs    Weight Used for Protein Calculation 72.4 kg (159 lb 9.8 oz)    Protein (gm/kg) 1.0    1.0 Gm Protein (gm) 72.4    Estimated/Assessed Fluid Needs    Fluid Need Method RDA method    RDA Method (mL)  1740            Nutrition " Prescription Ordered       01/23/17 1124    Nutrition Prescription PO    Current PO Diet Soft Texture    Texture Ground    Fluid Consistency Nectar/syrup thick            Evaluation of Received Nutrient/Fluid Intake       01/23/17 1125    Evaluation of Received Nutrient/Fluid Intake    Nutrition Delivered Fluid Evaluation    Fluid Intake Evaluation    Oral Fluid (mL) 840    Total Fluid Intake (mL) 840    % Fluid Needs 48%    PO Evaluation    Number of Meals 2    % PO Intake 50%              Problem/Interventions:        Problem 1       01/23/17 1125    Nutrition Diagnoses Problem 1    Problem 1 Inadequate Intake/Infusion    Inadequate Intake Type Oral    Etiology (related to) Medical Diagnosis    Signs/Symptoms (evidenced by) PO Intake                    Intervention Goal       01/23/17 1126    Intervention Goal    PO Increase intake;PO intake (%)    PO Intake % 75 %    Weight Maintain weight            Nutrition Intervention       01/23/17 1126    Nutrition Intervention    RD/Tech Action Follow Tx progress            Nutrition Prescription       01/23/17 1126    Nutrition Prescription PO    PO Prescription Begin/change supplement    Supplement Ensure    Supplement Frequency 3 times a day            Education/Evaluation       01/23/17 1126    Education    Education Education not appropriate at this time   Pt confused but pleasant. Will follow    Monitor/Evaluation    Monitor Per protocol;I&O;PO intake;Supplement intake;Pertinent labs;Weight        Comments:     Recommend: Ensure 3 per day to aid with po intake.    Will cont to follow.    Electronically signed by:  Thuy Godinez RD  01/23/17 11:27 AM

## 2017-01-23 NOTE — PLAN OF CARE
Problem: Inpatient Occupational Therapy  Goal: Transfer Training Goal 1 STG- OT    01/23/17 1316   Transfer Training OT STG   Transfer Training OT STG, Date Established 01/16/17   Transfer Training OT STG, Time to Achieve 3 days   Transfer Training OT STG, Activity Type sit to stand/stand to sit   Transfer Training OT STG, [place] Level moderate assist (50% patient effort)   Transfer Training OT STG, Assist Device walker, standard  (for improved ind with transfers to w/c)       Goal: Dynamic Sitting Balance Goal LTG- OT    01/23/17 1316   Dynamic Sitting Balance OT LTG   Dynamic Sitting Balance OT LTG, Date Established 01/23/17   Dynamic Sitting Balance OT LTG, Time to Achieve 3 days   Dynamic Sitting Balance OT LTG, [place] Level minimum assist (75% patient effort)  3-5 minutes for improved ADL ind   Dynamic Sitting Balance OT LTG, Assist Device UE Support

## 2017-01-23 NOTE — PLAN OF CARE
Problem: Patient Care Overview (Adult)  Goal: Plan of Care Review  Outcome: Ongoing (interventions implemented as appropriate)    01/23/17 1038   Coping/Psychosocial Response Interventions   Plan Of Care Reviewed With patient   Outcome Evaluation   Outcome Summary/Follow up Plan PT: patient requires max A x 2 for bed mobility and demonstrates decreased sitting balance on edge of bed. Improved balance today compaired to last visit, maintaining static sitting balance x 3 minutes with close SBA. PT to see memo craig in hospital to progress sitting balance and progress to transfers if able. Recommend nursing use colleen lift for transfers to chair at this time.   Patient Care Overview   Progress progress toward functional goals is gradual

## 2017-01-23 NOTE — PLAN OF CARE
Problem: Patient Care Overview (Adult)  Goal: Plan of Care Review    01/23/17 0918   Outcome Evaluation   Outcome Summary/Follow up Plan OT evaluation completed. Pt required max assist of 2 for bed mobiltiy. Pt sat at EOB briefly with SBA, max assist needed for dynamic sitting balance. Ot to follow while in hospital to improve balance and independence with safet transfers.

## 2017-01-23 NOTE — PROGRESS NOTES
Acute Care - Occupational Therapy Initial Evaluation   Brianne Lowe     Patient Name: Chance Bocanegra  : 1935  MRN: [id number]  Today's Date: 2017  Onset of Illness/Injury or Date of Surgery Date: 17     Referring Physician: Dr. Rizo    Admit Date: 2017       ICD-10-CM ICD-9-CM   1. Pneumonia of right lower lobe due to infectious organism J18.9 483.8   2. Sepsis, due to unspecified organism A41.9 038.9     995.91   3. Urinary retention R33.9 788.20   4. Prostatic hypertrophy N40.0 600.90   5. Hyperglycemia R73.9 790.29   6. Fecal impaction K56.41 560.32   7. 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       OT ASSESSMENT FLOWSHEET (last 72 hours)      OT Evaluation       17 1208 17 0920 17 0918 17 0810 17 1555    Rehab Evaluation    Document Type   evaluation  -EN      Subjective Information   agree to therapy;complains of;dizziness  -EN      Patient Effort, Rehab Treatment   fair  -EN      Symptoms Noted During/After Treatment   fatigue  -EN      General Information    Patient Profile Review   yes  -EN      Onset of Illness/Injury or Date of Surgery Date   17  -EN       Referring Physician   Dr. Rizo  -EN      General Observations   Pt supine in bed, IV and catheter in place.  -EN      Pertinent History Of Current Problem   Patient is resident of [facility]. Per chart review, pt fell at NH 1/4/17 and x-ray revealed T-12 fracture of unknown chronicity. Per chart review, pt does not ambulate and transfers to a w/c with SBA and propells independently. Patient admitted to [facility] with sepsis and pneumonia.  -EN      Precautions/Limitations   fall precautions  -EN      Prior Level of Function   --   assist with ADLs and transfers  -EN      Equipment Currently Used at Home   wheelchair  -EN      Plans/Goals Discussed With   patient;agreed upon  -EN      Risks Reviewed   patient:;increased discomfort  -EN      Benefits Reviewed   patient:;improve function;increase strength  -EN      Barriers to Rehab   previous functional deficit;cognitive status  -EN      Living Environment    Lives With   --   ECF  -EN      Living Arrangements   extended care facility  -EN      Clinical Impression    Functional Level At Time Of Evaluation   Pt required max assist of 2 for bed mobility. SBA to min assist for static sitting balance, max assist for dynamic sitting balance. Max assist for ADL activities.  -EN      Patient/Family Goals Statement   Required encouragement to participate.  -EN      Criteria for Skilled Therapeutic Interventions Met   yes;treatment indicated  -EN      Rehab Potential   fair, will monitor progress closely  -EN      Therapy Frequency   3-5 times/wk  -EN      Predicted Duration of Therapy Intervention (days/wks)   will continue to assess  -EN      Anticipated Discharge Disposition   extended care facility  -EN      Functional Level Prior    Ambulation   3-->assistive equipment and person  -EN      Transferring   3-->assistive equipment and person  -EN      Toileting   3-->assistive equipment and person  -EN      Bathing   3-->assistive equipment and person  -EN      Dressing    0-->independent  -EN      Eating   0-->independent  -EN      Communication   0-->understands/communicates without difficulty  -EN      Pain Assessment    Pain Assessment   --   generalized pain, no numeric value stated  -EN      Cognitive Assessment/Intervention    Current Cognitive/Communication Assessment   impaired  -EN      Orientation Status   oriented to;person  -EN      Follows Commands/Answers Questions   50% of the time;able to follow single-step instructions;needs cueing;needs increased time;needs repetition  -EN      Personal Safety   --   impaired  -EN      Personal Safety Interventions   gait belt;nonskid shoes/slippers when out of bed  -EN      Short/Long Term Memory   --   impaired  -EN      ROM (Range of Motion)    General ROM Detail   B UE AROM WFL  -EN      MMT (Manual Muscle Testing)    General MMT Assessment Detail   B ue strength appeared WFL  -EN      Muscle Tone Assessment    Muscle Tone Assessment         Bilateral Upper Extremities Muscle Tone Assessment         Bilateral Lower Extremities Muscle Tone Assessment         Bed Mobility, Assessment/Treatment    Bed Mobility, Assistive Device   bed rails;draw sheet;head of bed elevated  -EN      Bed Mob, Supine to Sit, [place]   maximum assist (25% patient effort);2 person assist required  -EN      Bed Mob, Sit to Supine, [place]   maximum assist (25% patient effort);2 person assist required  -EN      Bed Mobility, Comment         Transfer Assessment/Treatment    Transfer, Comment   -LH       Upper Body Bathing Assessment/Training    UB Bathing Assess/Train, [place] Level   moderate assist (50% patient effort)  -EN      UB Bathing Assess/Train, Comment   Difficulty with ADLs due to poor sitting balance  -EN      Lower Body Bathing Assessment/Training    LB Bathing Assess/Train, [place] Level   maximum assist (25% patient effort)  -EN      Upper Body Dressing Assessment/Training    UB Dressing Assess/Train, [place]    moderate assist (50% patient effort)  -EN      Lower Body Dressing Assessment/Training    LB Dressing Assess/Train, [place]   maximum assist (25% patient effort)  -EN      Balance Skills Training    Sitting-Level of Assistance   Maximum assistance   SBA briefly for static, max for dynamic  -EN      Sitting-Balance Support   Feet supported  -EN      Sitting # of Minutes   5  -EN      General Therapy Interventions    Planned Therapy Interventions   transfer training;balance training  -EN      Positioning and Restraints    Pre-Treatment Position   in bed  -EN      Post Treatment Position   bed  -EN      In Bed   notified nsg;call light within reach;encouraged to call for assist;exit alarm on   sidelying on right side due to c/o sore buttocks  -EN        01/22/17 1312 01/22/17 1153 01/22/17 1000 01/22/17 0812 01/22/17 0227    Rehab Evaluation      User Key  (r) = Recorded By, (t) = Taken By, (c) = Cosigned By    Initials Name Effective Dates    LH Christina Acevedo, PT 08/11/15 -     SP Elda Ramirez, PT 08/11/15 -     SOL Jaffe, MS CCC-SLP 06/22/16 -     EN Mireya Carbone, OTR 06/22/16 -     CC Katya Boyd, RN 06/16/16 -     MARICRUZ Sorensen, RN 06/16/16 -     AM Jess Abel RN 06/16/16 -            Occupational Therapy Education     Title: PT OT SLP Therapies (Active)     Topic: Occupational Therapy (Active)     Point: ADL training (Active)    Description: Instruct learner(s) on proper safety adaptation and remediation techniques during self care or transfers.   Instruct in proper use of assistive devices.    Learning Progress Summary    Learner Readiness Method Response Comment Documented by Status   Patient Acceptance E NR pt educated on benefits of activity and bed mobility. EN 01/23/17 1315 Active                      User Key     Initials Effective Dates Name Provider Type Discipline    EN 06/22/16 -  Mireya Carbone, OTR Occupational Therapist OT                   OT Recommendation and Plan  Anticipated Discharge Disposition: extended care facility  Planned Therapy Interventions: transfer training, balance training  Therapy Frequency: 3-5 times/wk  Plan of Care Review  Plan Of Care Reviewed With: patient  Progress: progress toward functional goals is gradual  Outcome Summary/Follow up Plan: OT evaluation completed. Pt required max assist of 2 for bed mobiltiy. Pt sat at EOB briefly with SBA, max assist needed for dynamic sitting balance. Ot to follow while in hospital to improve balance and independence with safet transfers.          OT Goals       01/23/17 1316          Transfer Training OT STG    Transfer Training OT STG, Date Established 01/16/17  -EN      Transfer Training OT STG, Time to Achieve 3 days  -EN      Transfer Training OT STG, Activity Type sit to stand/stand to sit  -EN      Transfer Training OT STG, [place] Level moderate assist (50% patient effort)  -EN      Transfer Training OT STG, Assist Device walker, standard   for improved ind with transfers to w/c  -EN      Dynamic Sitting Balance OT LTG    Dynamic Sitting Balance OT LTG, Date Established 01/23/17  -EN      Dynamic Sitting Balance OT LTG, Time to Achieve 3 days  -EN      Dynamic Sitting Balance OT LTG, [place] Level minimum assist (75% patient effort)   3-5 minutes for improved ADL ind  -EN      Dynamic Sitting Balance OT LTG, Assist Device UE Support  -EN        User Key  (r) = Recorded By, (t) = Taken By, (c) = Cosigned By    Initials Name Provider Type    GONZALEZ Carbone OTR Occupational Therapist                Outcome Measures       01/23/17 1300 01/23/17 1000 01/23/17 0918    How much help from another person do you currently need...    Turning from your back to your side while in flat bed without using bedrails?  2  -LH     Moving from lying on back to sitting on the side of a flat bed without bedrails?  1  -LH     Moving to and from a bed to a chair (including a  wheelchair)?  1  -LH     Standing up from a chair using your arms (e.g., wheelchair, bedside chair)?  1  -LH     Climbing 3-5 steps with a railing?  1  -LH     To walk in hospital room?  1  -LH     AM-PAC 6 Clicks Score  7  -LH     How much help from another is currently needed...    Putting on and taking off regular lower body clothing?   2  -EN    Bathing (including washing, rinsing, and drying)   2  -EN    Toileting (which includes using toilet bed pan or urinal)   2  -EN    Putting on and taking off regular upper body clothing   2  -EN    Taking care of personal grooming (such as brushing teeth)   3  -EN    Eating meals   3  -EN    Score   14  -EN    Functional Assessment    Outcome Measure Options AM-PAC 6 Clicks Daily Activity (OT)  -EN AM-PAC 6 Clicks Basic Mobility (PT)  -       01/22/17 1000          How much help from another person do you currently need...    Turning from your back to your side while in flat bed without using bedrails? 2  -SP      Moving from lying on back to sitting on the side of a flat bed without bedrails? 1  -SP      Moving to and from a bed to a chair (including a wheelchair)? 1  -SP      Standing up from a chair using your arms (e.g., wheelchair, bedside chair)? 1  -SP      Climbing 3-5 steps with a railing? 1  -SP      To walk in hospital room? 1  -SP      AM-PAC 6 Clicks Score 7  -SP      Functional Assessment    Outcome Measure Options AM-PAC 6 Clicks Basic Mobility (PT)  -SP        User Key  (r) = Recorded By, (t) = Taken By, (c) = Cosigned By    Initials Name Provider Type     Christina Acevedo, PT Physical Therapist    SP Elda Ramirez, PT Physical Therapist    EN Mireya Carbone, OTR Occupational Therapist          Time Calculation:   OT Start Time: 0918  OT Stop Time: 0932  OT Time Calculation (min): 14 min    Therapy Charges for Today     Code Description Service Date Service Provider Modifiers Qty    58139867364  OT EVAL MOD COMPLEXITY 1 1/23/2017  Mireya Carbone, OTR GO 1               Mireya Carbone, OTR  1/23/2017

## 2017-01-23 NOTE — PROGRESS NOTES
Acute Care - Physical Therapy Treatment Note   Brianne Lowe     Patient Name: Chance Bocanegra  : 1935  MRN: [id number]  Today's Date: 2017  Onset of Illness/Injury or Date of Surgery Date: 17  Date of Referral to PT: 17  Referring Physician: Dr. Rizo    Admit Date: 2017    Visit Dx:    ICD-10-CM ICD-9-CM   1. Pneumonia of right lower lobe due to infectious organism J18.9 483.8   2. Sepsis, due to unspecified organism A41.9 038.9     995.91   3. Urinary retention R33.9 788.20   4. Prostatic hypertrophy N40.0 600.90   5. Hyperglycemia R73.9 790.29   6. Fecal impaction K56.41 560.32   7. Oropharyngeal dysphagia R13.12 787.22     Patient Active Problem List   Diagnosis   • Pneumonia of right lower lobe due to infectious organism               Adult Rehabilitation Note       17 0920          Rehab Assessment/Intervention    Discipline physical therapist  -      Document Type therapy note (daily note)  -      Subjective Information agree to therapy;complains of;pain  -      Patient Effort, Rehab Treatment fair  -      Symptoms Noted During/After Treatment fatigue  -      Recorded by [] Christina Acevedo, PT      Pain Assessment    Pain Assessment --   c/o generalized pain in arms/legs, unable to rate   -      Recorded by [] Christina Acevedo, PT      Bed Mobility, Assessment/Treatment    Bed Mobility, Assistive Device bed rails;head of bed elevated  -      Bed Mob, Supine to Sit, [place] maximum assist (25% patient effort);2 person assist required;verbal cues required  -      Bed Mob, Sit to Supine, [place] maximum assist (25% patient effort);dependent (less than 25% patient effort);2 person assist required;verbal cues required  -      Bed Mobility, Comment Patient sat edge of bed x 5 minutes.  Initial Loss of balance left, requiring min A, however improved with time, progressing to close SBA x 3 minutes.  -      Recorded by [] Christina Acevedo, PT       Transfer Assessment/Treatment    Transfer, Comment unsafet to attempt transfers at this time due to poor sitting balance, decreased safety  -LH      Recorded by [LH] Christina Acevedo, PT      Positioning and Restraints    Pre-Treatment Position in bed  -LH      Post Treatment Position bed  -LH      In Bed notified nsg;call light within reach;encouraged to call for assist;exit alarm on;side lying right   patient c/o pain in buttock.  Nurse notified  -LH      Recorded by [] Christina Acevedo PT        User Key  (r) = Recorded By, (t) = Taken By, (c) = Cosigned By    Initials Name Effective Dates     Christina Acevedo, PT 08/11/15 -                 IP PT Goals       01/22/17 1128          Bed Mobility PT STG    Bed Mobility PT STG, Date Established 01/22/17  -SP      Bed Mobility PT STG, Time to Achieve 3 days  -SP      Bed Mobility PT STG, Activity Type all bed mobility  -SP      Bed Mobility PT STG, [place] Level minimum assist (75% patient effort)  -SP      Transfer Training PT STG    Transfer Training PT STG, Date Established 01/22/17  -SP      Transfer Training PT STG, Time to Achieve 5 days  -SP      Transfer Training PT STG, Activity Type bed to chair /chair to bed  -SP      Transfer Training PT STG, [place] Level minimum assist (75% patient effort)  -SP      Transfer Training PT LTG    Transfer Training PT LTG, Date Established 01/22/17  -SP      Transfer Training PT LTG, Activity Type sit to stand/stand to sit  -SP      Transfer Training PT LTG, [place] Level minimum assist (75% patient effort)  -SP        User Key  (r) = Recorded By, (t) = Taken By, (c) = Cosigned By    Initials Name Provider Type    ALYSSA Ramirez, PT Physical Therapist          Physical Therapy Education     Title: PT OT SLP Therapies (Done)     Topic: Physical Therapy (Done)     Point: Mobility training (Done)    Learning Progress Summary    Learner Readiness Method Response Comment Documented by Status    Patient Acceptance E VU   01/23/17 1041 Done    Acceptance E VU,NR transfers and importance of activity  01/22/17 1121 Done                      User Key     Initials Effective Dates Name Provider Type Discipline     08/11/15 -  Christina Acevedo, PT Physical Therapist PT    SP 08/11/15 -  Elda Ramirez, PT Physical Therapist PT                    PT Recommendation and Plan  Planned Therapy Interventions: balance training, bed mobility training, transfer training  PT Frequency: daily, 5 times/wk  Plan of Care Review  Plan Of Care Reviewed With: patient  Progress: progress toward functional goals is gradual  Outcome Summary/Follow up Plan: PT:  patient requires max A x 2 for bed mobility and demonstrates decreased sitting balance on edge of bed.  Improved balance today compaired to last visit, maintaining static sitting balance x 3 minutes with close SBA.  PT to see memo craig in hospital to progress sitting balance and progress to transfers if able.  Recommend nursing use colleen lift for transfers to chair at this time.          Outcome Measures       01/23/17 1000 01/22/17 1000       How much help from another person do you currently need...    Turning from your back to your side while in flat bed without using bedrails? 2  - 2  -SP     Moving from lying on back to sitting on the side of a flat bed without bedrails? 1  - 1  -SP     Moving to and from a bed to a chair (including a wheelchair)? 1  - 1  -SP     Standing up from a chair using your arms (e.g., wheelchair, bedside chair)? 1  - 1  -SP     Climbing 3-5 steps with a railing? 1  - 1  -SP     To walk in hospital room? 1  - 1  -SP     AM-PAC 6 Clicks Score 7  - 7  -SP     Functional Assessment    Outcome Measure Options AM-PAC 6 Clicks Basic Mobility (PT)  - AM-PAC 6 Clicks Basic Mobility (PT)  -SP       User Key  (r) = Recorded By, (t) = Taken By, (c) = Cosigned By    Initials Name Provider Type     Christina Acevedo, PT  Physical Therapist    SP Elda Ramirez, PT Physical Therapist           Time Calculation:         PT Charges       01/23/17 1041          Time Calculation    Start Time 0920  -      Stop Time 0932  -      Time Calculation (min) 12 min  -      PT Received On 01/23/17  -      PT - Next Appointment 01/24/17  -        User Key  (r) = Recorded By, (t) = Taken By, (c) = Cosigned By    Initials Name Provider Type     Christina Acevedo, PT Physical Therapist          Therapy Charges for Today     Code Description Service Date Service Provider Modifiers Qty    60453098664  PT THER PROC EA 15 MIN 1/23/2017 Christina Acevedo, PT GP 1          PT G-Codes  Outcome Measure Options: AM-PAC 6 Clicks Basic Mobility (PT)  Changing and Maintaining Body Position Current Status (): 100 percent impaired, limited or restricted  Changing and Maintaining Body Position Goal Status (): At least 40 percent but less than 60 percent impaired, limited or restricted    Christina Acevedo, PT  1/23/2017

## 2017-01-23 NOTE — SIGNIFICANT NOTE
01/23/17 1455   Rehab Treatment   Discipline speech language pathologist   Rehab Evaluation   Evaluation Not Performed other (see comments)  (FEES report obtained from [facility]. Pt with no penetration or aspiration of any consistency but due to clinical cough reported with foods and hx of aspiration pneumonia, diet changed to mech soft with nectar thick liquids. Copy of report placed in chart.)

## 2017-01-23 NOTE — NURSING NOTE
1/23/17 @ 1325-- I SPOKE WITH COLT FROM THE [place] [facility]LIER AND SHE STATED PATIENT IS CUR[facility] AND CAN RETURN AT DISCHARGE- I NOTIFIED HER PER MD AND AM MEETING TODAY THAT PATIENT IS NOT ANTICIPATED FOR DISCHARGE TODAY

## 2017-01-23 NOTE — PROGRESS NOTES
[facility]  Phone: [phone]  Alec Bolton MD    Subjective:  LOS: 1    He is without new complaints. unclear if he follows diet at NH.    Objective   Vital Signs past 24hrs  BP range: BP: (122-133)/(71-79) 132/71  Pulse range: Heart Rate:  [72-97] 72  Resp rate range: Resp:  [18-20] 19  Temp range: Temp (24hrs), Av.9 °F (36.6 °C), Min:97.1 °F (36.2 °C), Max:98.8 °F (37.1 °C)    O2 Device: nasal cannulaFlow (L/min):  [2] 2  Oxygen range:SpO2:  [94 %-98 %] 98 %   159 lb 9.6 oz (72.4 kg); Body mass index is 22.26 kg/(m^2).    Intake/Output Summary (Last 24 hours) at 17 1315  Last data filed at 17 1112   Gross per 24 hour   Intake   2410 ml   Output   1500 ml   Net    910 ml       Physical Exam   Constitutional: He appears well-developed.   HENT:   Head: Normocephalic.   Eyes: Pupils are equal, round, and reactive to light.   Neck: Normal range of motion. No JVD present.   Cardiovascular: Normal rate and regular rhythm.    No murmur heard.  Pulmonary/Chest: No respiratory distress. He has decreased breath sounds. He has rales (R>L) in the right lower field and the left lower field.   Abdominal: Soft. Bowel sounds are normal. He exhibits no mass. There is no tenderness.   Musculoskeletal: He exhibits no edema.   Neurological: He is alert.   Skin: Skin is warm and dry.     Results Review:    I have reviewed the laboratory and imaging data since the last note by [facility] physician.  My annotations are noted in assessment and plan.    Medication Review:  I have reviewed the current MAR.  My annotations are noted in assessment and plan.      dextrose 100 mL/hr Last Rate: 100 mL/hr (17 0820)   Pharmacy to dose vancomycin       Plan   PCCM Problems  Pneumonia RLL  COPD  Dysphagia  Sepsis  Relevant Medical Diagnoses  Dementia  Hypokalemia  Fecal impaction  T12 compression #    Plan of Treatment  Failed outpatient abx but now procalcitonin normal and WBC normal. May be able to step down abx  soon. Cultures so far NG.    Strict adherence to modified diet. May need downgrading if recurrent aspiration. Unclear if he is following diet at NH.    COPD without exacerbation. Add [place].    Sepsis resolving.    Alec Bolton MD  01/23/17  1:15 PM    EMR Dragon/Transcription disclaimer:   Some of this note may be an electronic transcription/translation of spoken language to printed text. The electronic translation of spoken language may permit erroneous, or at times, nonsensical words or phrases to be inadvertently transcribed; Although I have reviewed the note for such errors, some may still exist.

## 2017-01-23 NOTE — THERAPY DISCHARGE NOTE
Acute Care - Speech Language Pathology   Swallow Eval/Discharge MARIS Barber     Patient Name: Chance Bocanegra  : 1935  MRN: [id number]  Today's Date: 2017  Onset of Illness/Injury or Date of Surgery Date: 17     Referring Physician: Donato Alvarado      Admit Date: 2017    Visit Dx:    ICD-10-CM ICD-9-CM   1. Pneumonia of right lower lobe due to infectious organism J18.9 483.8   2. Sepsis, due to unspecified organism A41.9 038.9     995.91   3. Urinary retention R33.9 788.20   4. Prostatic hypertrophy N40.0 600.90   5. Hyperglycemia R73.9 790.29   6. Fecal impaction K56.41 560.32   7. 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       SWALLOW EVALUATION (last 72 hours)      Swallow Evaluation       17 1312    Rehab Evaluation    Document Type evaluation  -AD    Subjective Information no complaints;agree to therapy  -AD    Patient Effort, Rehab Treatment Comment Alert and cooperative, but confused. Limited verbal and limited ability to follow commands  -AD    Symptoms Noted During/After Treatment none  -AD    General Information    Patient Profile Review yes  -AD    Onset of Illness/Injury 17  -AD     Referring Physician Donato Alvarado  -AD    Subjective Patient Observations Pt was seen at bedside, upright during lunch. Family present and providing history for part of the evaluation.   -AD    Pertinent History Of Current Problem Pt admitted with RLL pneumonia, elevated blood sugar, and fecal impaction. Found to be septic and suspected aspiration. Hx of aspiration pneumonia per [facility] records. He recently had a FEES study completed on 1/17/17 and his diet was changed to Mechanical Soft with nectar thick liquids. His diet was also changed here this am. Pt also with a history of dementia, GERD, T12  compression fracture, TIA and recent pneumonia.   -AD    Current Diet Limitations nectar thick liquids;mechanical soft  -AD    Precautions/Limitations, Vision --   vision problems reported but level unknown  -AD    Precautions/Limitations, Hearing --   Unable to fully assess as pt w/advancing dementia  -AD    Prior Level of Function- Communication limited to basic wants/needs  -AD    Prior Level of Function- Swallowing dietary restrictions (e.g. consistent carb, low salt, etc);diet modifications- liquid;diet modifications- foods;concerns regarding dehydration;concerns regarding malnutrition;esophageal concerns;other (comment)   cardiac, consistent carbs  -AD    Plans/Goals Discussed With patient and family;agreed upon   pt does not demonstrate understanding at this time  -AD    Barriers to Rehab previous functional deficit;cognitive status;visual deficit  -AD    Clinical Impression    Patient's Goals For Discharge patient could not state  -AD    Family Goals For Discharge patient able to return to all previous activities/roles  -AD    SLP Swallowing Diagnosis mild dysphagia;moderate dysphagia;oral dysfunction;pharyngeal dysfunction  -AD    Criteria for Skilled Therapeutic Interventions Met no significant expected improvement in functional status  -AD    FCM, Swallowing 4-->Level 4  -AD    Therapy Frequency  evaluation only  -AD    SLP Diet Recommendation soft textures;ground;IV - mechanical soft, no mixed consistencies;nectar/syrup-thick liquids  -AD    Recommended Diagnostics other (see comments)   need to obtain copy of FEES from [facility]  -    Recommended Feeding/Eating Techniques maintain upright posture during/after eating for 30 mins;small sips/bites;multiple swallow technique  -AD    SLP Rec. for Method of Medication Administration meds whole in pudding/applesauce  -AD    Monitor For Signs Of Aspiration right lower lobe infiltrates   unknown if silent aspiration or not  -AD    Anticipated Discharge Disposition skilled nursing facility   [facility]  -    Pain Assessment    Pain Assessment No/denies pain   Per nurse complained prior to eval. Pain meds given.  -AD    Cognitive Assessment/Intervention    Current Cognitive/Communication Assessment impaired  -AD    Orientation Status oriented to;disoriented to;place;time;situation   name  -AD    Follows Commands/Answers Questions able to follow single-step instructions;25% of the time  -AD    Oral Motor Structure and Function    Oral Motor Anatomy and Physiology --   difficult to assess  -AD    Dentition Assessment upper dentures/partial in place;lower dentures/partial in place;poor oral hygiene;other (see comments)   Oral care provided. Dentures w/caked material/food  -AD    Secretion Management WNL/WFL  -AD    Mucosal Quality moist, healthy  -AD    Velar Elevation --   unable to assess as pt unable to follow commands  -AD    Gag Response hyperactive;other (see comments)   gags w/oral care to palate  -AD    Volitional Swallow unable to initiate volitional swallow  -AD    Volitional Cough absent volitional cough  -AD    Oral Musculature General Assessment lingual impairment  -AD    Lingual Strength and Mobility reduced ROM;other (see comments)   deviates to the right w/protrusion. Unable to assess further  -AD    General Feeding/Swallowing  Observations    Current Feeding Method oral feeding methods  -AD    Respiratory Support Currently in Use nasal cannula in use  -AD    Observations of Self Feeding Skills appropriate self feeding skills observed  -AD    Observations of Posture During Feeding Upright in bed at 45 degree angle. Raised to 80 degrees  -AD    SpO2 Level Following Swallow 94  -AD    Clinical Swallow Exam    Mode of Presentation self fed;spoon;straw  -AD    Oral Preparation Concerns cohesive solid:;excess chewing noted;increased prep time  -AD    Oral Transit Concerns cohesive solid:;increased oral transit time  -AD    Oral Residue cohesive solid:;residue throughout oral cavity  -AD    Oral Phase Results impaired oral phase, signs of dysfunction present  -AD    Pharyngeal Phase Results multiple swallows;throat clear;susupected impaired pharyngeal phase of swallowing   Cough not directly w/swallow ? pneum or pen/asp?  -AD    Summary of Clinical Exam Pt presents with a mild to moderate oropharyngeal dysphagia. Hx of aspiration pneumonia. [facility]. He demonstrates continued signs of dysphagia with multiple swallows, cough and oral residue throughout the oral cavity. Pt does clear some on his own. Needs assistance with oral care and denture care. Caked food on upper and lower plate. Rec to continue with current diet and obtain records from [facility]: recent FEES.   -AD    Swallow Recommendations    Eating Assistance requires caregiver to assist with positioning during eating;other (see comments)   assist with setup  -AD    Oral Care oral care with toothbrush and dentifrice before breakfast and after meals and PRN  -AD    Modified Eating Strategies upright positioning 90 degrees;reflux precautions  -AD    Other Recommendations meds whole;other (comment)   in puree  -AD    Recommended Diet soft textures;ground;nectar/syrup-thick liquids;IV - mechanical soft, no mixed consistencies  -AD    Positioning  and Restraints    Pre-Treatment Position in bed  -AD    Post Treatment Position bed  -AD    In Bed call light within reach;encouraged to call for assist;side rails up x2  -AD      User Key  (r) = Recorded By, (t) = Taken By, (c) = Cosigned By    Initials Name Effective Dates    AD Jamia Jaffe MS CCC-SLP 06/22/16 -         SLP Recommendation and Plan  SLP Swallowing Diagnosis: mild dysphagia, oral dysfunction, pharyngeal dysfunction  SLP Diet Recommendation: soft textures, ground, IV - mechanical soft, no mixed consistencies, nectar/syrup-thick liquids  Recommended Feeding/Eating Techniques: maintain upright posture during/after eating for 30 mins, small sips/bites, multiple swallow technique  SLP Rec. for Method of Medication Administration: meds whole in pudding/applesauce  Criteria for Skilled Therapeutic Interventions Met: no significant expected improvement in functional status  Anticipated Discharge Disposition: skilled nursing facility (Return to [facility] when medically stable.)  Therapy Frequency: evaluation only     Time Calculation:       Therapy Charges for Today     Code Description Service Date Service Provider Modifiers Qty    [id number] HC ST EVAL ORAL PHARYNG SWALLOW 4 1/22/2017 Jamia Jaffe MS CCC-SLP GN 1          SLP Discharge Summary  Anticipated Discharge Disposition: skilled nursing facility (Return to [facility] when medically stable.)  Reason for Discharge: At baseline function  Outcomes Achieved: Other (evaluation only)   No therapy or further evaluation indicated at this time. Copy of FEES report obtained and in patient's chart.     Jamia aJffe MS CCC-SLP  1/23/2017

## 2017-01-23 NOTE — PROGRESS NOTES
"Hospitalist Team      Patient Care Team:  Thomas Amos MD as PCP - General  Thomas Amos MD as PCP - Family Medicine        Chief Complaint:  F/U sepsis secondary to pneumonia, fecal impaction, hypernatremia    Subjective    Interval History and ROS:     Patient notes he is tired and wants help to reposition.  Denies any SOA and states he cannot remember if he is coughing.  Does not remember that he is in the hospital.  Afebrile.      Objective    Vital Signs  Temp:  [97.1 °F (36.2 °C)-98.3 °F (36.8 °C)] 98.2 °F (36.8 °C)  Heart Rate:  [72-97] 82  Resp:  [19-23] 23  BP: (122-141)/(71-84) 141/84  Oxygen Therapy  SpO2: 98 %  Pulse Oximetry Type: Continuous  O2 Device: nasal cannula  Flow (L/min): 2 L  Flowsheet Rows         First Filed Value    Admission Height  71\" (180.3 cm) Documented at 01/21/2017 2035    Admission Weight  125 lb (56.7 kg) Documented at 01/21/2017 2151            Physical Exam:  Physical Exam   Constitutional: He is well-developed, well-nourished, and in no distress.   HENT:   Head: Normocephalic and atraumatic.   Eyes: EOM are normal. No scleral icterus.   Neck: Neck supple. No JVD present.   Cardiovascular: Normal rate, regular rhythm and normal heart sounds.  Exam reveals no gallop and no friction rub.    No murmur heard.  Pulmonary/Chest: Effort normal. No respiratory distress. He has no wheezes. He has no rales.   Diminished breath sounds most notably at the bases R>L   Abdominal: Soft. Bowel sounds are normal. He exhibits no distension. There is no tenderness.   Musculoskeletal: He exhibits no edema.   Neurological: He is alert.   Oriented to person and time but not place and confused in conversation.         Results Review:     I reviewed the patient's new clinical results.    Lab Results (last 24 hours)     Procedure Component Value Units Date/Time    POC Glucose Fingerstick [[id number]]  (Abnormal) Collected:  01/22/17 1640    Specimen:  Blood Updated:  01/22/17 1647     Glucose 156 (H) " mg/dL     Narrative:       Meter: [id number] : [id number] Sukhjinder Ernst    POC Glucose Fingerstick [[id number]]  (Abnormal) Collected:  01/22/17 2025    Specimen:  Blood Updated:  01/22/17 2032     Glucose 222 (H) mg/dL     Narrative:       Meter: [id number] : [id number] Mecca Carlson    Blood Culture [[id number]]  (Normal) Collected:  01/21/17 2050    Specimen:  Blood from Blood, Venous Line Updated:  01/22/17 2201     Blood Culture No growth at 24 hours     Blood Culture [[id number]]  (Normal) Collected:  01/21/17 2211    Specimen:  Blood from Blood, Venous Line Updated:  01/22/17 2301     Blood Culture No growth at 24 hours     CBC & Differential [[id number]] Collected:  01/23/17 0411    Specimen:  Blood Updated:  01/23/17 0424    Narrative:       The following orders were created for panel order CBC & Differential.  Procedure                               Abnormality         Status                     ---------                               -----------         ------                     CBC Auto Differential[[id number]]         Abnormal            Final result                 Please view results for these tests on the individual orders.    CBC Auto Differential [[id number]]  (Abnormal) Collected:  01/23/17 0411    Specimen:  Blood Updated:  01/23/17 0424     WBC 10.59 10*3/mm3      RBC 4.06 (L) 10*6/mm3      Hemoglobin 11.7 (L) g/dL      Hematocrit 37.2 (L) %      MCV 91.6 fL      MCH 28.8 pg      MCHC 31.5 g/dL      RDW 13.4 %      RDW-SD 45.3 fl      MPV 11.5 (H) fL      Platelets 196 10*3/mm3      Neutrophil % 69.6 %      Lymphocyte % 20.4 %      Monocyte % 6.1 %      Eosinophil % 3.3 %      Basophil % 0.3 %      Immature Grans % 0.3 %      Neutrophils, Absolute 7.37 10*3/mm3      Lymphocytes, Absolute 2.16 10*3/mm3      Monocytes, Absolute 0.65 10*3/mm3      Eosinophils, Absolute 0.35 (H) 10*3/mm3      Basophils, Absolute 0.03 10*3/mm3      Immature Grans, Absolute 0.03 10*3/mm3      nRBC 0.0 /100 WBC      Lactic Acid, Plasma [[id number]]  (Normal) Collected:  01/23/17 0411    Specimen:  Blood Updated:  01/23/17 0435     Lactate 0.8 mmol/L     Comprehensive Metabolic Panel [[id number]]  (Abnormal) Collected:  01/23/17 0411    Specimen:  Blood Updated:  01/23/17 0510     Glucose 92 mg/dL      BUN 12 mg/dL      Creatinine 0.76 mg/dL      Sodium 143 mmol/L      Potassium 3.3 (L) mmol/L      Chloride 108 (H) mmol/L      CO2 26.3 mmol/L      Calcium 8.1 (L) mg/dL      Total Protein 5.5 (L) g/dL      Albumin 2.30 (L) g/dL      ALT (SGPT) 6 U/L      AST (SGOT) 12 U/L      Alkaline Phosphatase 106 U/L      Total Bilirubin 0.4 mg/dL      eGFR Non African Amer 98 mL/min/1.73      Globulin 3.2 gm/dL      A/G Ratio 0.7 g/dL      BUN/Creatinine Ratio 15.8      Anion Gap 8.7 mmol/L     Narrative:       The MDRD GFR formula is only valid for adults with stable renal function between ages 18 and 70.    POC Glucose Fingerstick [[id number]]  (Abnormal) Collected:  01/23/17 0800    Specimen:  Blood Updated:  01/23/17 0825     Glucose 67 (L) mg/dL     Narrative:       Meter: [id number] : [id number] Sina PCA    POC Glucose Fingerstick [[id number]]  (Normal) Collected:  01/23/17 0823    Specimen:  Blood Updated:  01/23/17 0831     Glucose 76 mg/dL     Narrative:       Meter: [id number] : [id number] Sina PCA    POC Glucose Fingerstick [[id number]]  (Abnormal) Collected:  01/21/17 2034    Specimen:  Blood Updated:  01/23/17 1141     Glucose 511 (C) mg/dL     Narrative:       Physician Notified Meter: [id number] : [id number] Matthias Infante    MRSA Screen [[id number]]  (Normal) Collected:  01/22/17 1115    Specimen:  Swab from Nares Updated:  01/23/17 1159     MRSA SCREEN CX Culture in progress     POC Glucose Fingerstick [[id number]]  (Abnormal) Collected:  01/23/17 1149    Specimen:  Blood Updated:  01/23/17 1205     Glucose 237 (H) mg/dL     Narrative:       Meter: [id number] : [id number] Esau Headley PCA    Vancomycin,  Random [[id number]] Collected:  01/23/17 1154    Specimen:  Blood Updated:  01/23/17 1219     Vancomycin Random 12.10 mcg/mL     Hepatitis C Antibody [[id number]]  (Normal) Collected:  01/21/17 2316    Specimen:  Blood Updated:  01/23/17 1358     Hepatitis C Ab Non-Reactive           Imaging Results (last 24 hours)     ** No results found for the last 24 hours. **          ECG/EMG Results (most recent)     Procedure Component Value Units Date/Time    ECG 12 Lead [[id number]] Collected:  01/21/17 2041     Updated:  01/22/17 2057    Narrative:       RR Interval= 469 ms  MN Interval= 152 ms  QRSD Interval= 100 ms  QT Interval= 304 ms  QTc Interval= 444 ms  Heart Rate= 128 ms  P Axis= 44 deg  QRS Axis= 1 deg  T Wave Axis= 38 deg  I: 40 Axis= -56 deg  T: 40 Axis= 16 deg  ST Axis= 109 deg  SINUS TACHYCARDIA  VENTRICULAR PREMATURE COMPLEX  PROBABLE INFERIOR INFARCT, AGE INDETERMINATE  CONSIDER POSTERIOR WALL INVOLVEMENT  NO SIGNIFICANT CHANGE FROM PREVIOUS ECG  Electronically Signed by:  Hawk Melendrez) ([facility]) 22-Jan-2017 20:56:35  Date and Time of Study: 2017-01-21 20:41:55    ECG 12 Lead [[id number]] Collected:  01/22/17 1148     Updated:  01/22/17 2058    Narrative:       RR Interval= 600 ms  MN Interval= 160 ms  QRSD Interval= 100 ms  QT Interval= 372 ms  QTc Interval= 480 ms  Heart Rate= 100 ms  P Axis= 44 deg  QRS Axis= 8 deg  T Wave Axis= 20 deg  I: 40 Axis= -44 deg  T: 40 Axis= 57 deg  ST Axis= 239 deg  SINUS TACHYCARDIA  VENTRICULAR PREMATURE COMPLEX  INFERIOR INFARCT, AGE INDETERMINATE  BORDERLINE PROLONGED QT INTERVAL  NO SIGNIFICANT CHANGE FROM PREVIOUS ECG  Electronically Signed by:  Hawk Melendrez) ([facility]) 22-Jan-2017 20:57:00  Date and Time of Study: 2017-01-22 11:48:12          Medication Review:   I have reviewed the patient's current medication list    Current Facility-Administered Medications:   •  acetaminophen-codeine (TYLENOL #3) 300-30 MG per tablet 1 tablet, 1 tablet, Oral, Q6H PRN, Edwin Trejo  Abelardo Rizo MD, 1 tablet at 01/22/17 2006  •  arformoterol (BROVANA) nebulizer solution 15 mcg, 15 mcg, Nebulization, BID - RT, Alec Bolton MD  •  atorvastatin (LIPITOR) tablet 20 mg, 20 mg, Oral, Daily, Edwin Rizo MD, 20 mg at 01/23/17 0831  •  aztreonam (AZACTAM) 2 g in sodium chloride 0.9 % 100 mL IVPB-MBP, 2 g, Intravenous, Q8H, Stopped at 01/23/17 1149 **AND** levoFLOXacin (LEVAQUIN) 500 mg/100 mL D5W (premix) (LEVAQUIN) 500 mg, 500 mg, Intravenous, Q24H, Edwin Rizo MD, Last Rate: 0 mL/hr at 01/22/17 0826, 500 mg at 01/23/17 0311  •  baclofen (LIORESAL) tablet 10 mg, 10 mg, Oral, TID, Edwin Rizo MD, 10 mg at 01/23/17 1535  •  bisacodyl (DULCOLAX) suppository 10 mg, 10 mg, Rectal, Daily PRN, Edwin Rizo MD, 10 mg at 01/22/17 1543  •  budesonide (PULMICORT) nebulizer solution 0.5 mg, 0.5 mg, Nebulization, BID, Edwin Rizo MD, 0.5 mg at 01/23/17 1220  •  cefepime (MAXIPIME) 1 g/100 mL 0.9% NS IVPB (mbp), 1 g, Intravenous, Once, Melvin Hawkins MD, 1 g at 01/22/17 0306  •  dextrose (D50W) solution 25 g, 25 g, Intravenous, Q15 Min PRN, Edwin Rizo MD  •  dextrose (D50W) solution 25 g, 25 g, Intravenous, Q15 Min PRN, Shailesh Worthy MD  •  dextrose (D5W) 5 % infusion, 100 mL/hr, Intravenous, Continuous, Edwin Rizo MD, Last Rate: 100 mL/hr at 01/23/17 0820, 100 mL/hr at 01/23/17 0820  •  dextrose (GLUTOSE) oral gel 15 g, 15 g, Oral, Q15 Min PRN, Edwin Rizo MD  •  dextrose (GLUTOSE) oral gel 15 g, 15 g, Oral, Q15 Min PRN, Shailesh Worthy MD  •  docusate sodium (COLACE) capsule 100 mg, 100 mg, Oral, BID, Edwin Rizo MD, 100 mg at 01/23/17 0831  •  enoxaparin (LOVENOX) syringe 80 mg, 80 mg, Subcutaneous, Q12H, Edwin Rizo MD, 80 mg at 01/23/17 0830  •  famotidine (PEPCID) tablet 20 mg, 20 mg, Oral, BID, Edwin Rizo MD, 20 mg at 01/23/17 0830  •  ferrous sulfate EC tablet 324 mg, 324  mg, Oral, BID With Meals, Edwin Rizo MD, 324 mg at 01/23/17 0831  •  fluticasone (FLONASE) 50 MCG/ACT nasal spray 2 spray, 2 spray, Nasal, Daily, Edwin Rizo MD, 2 spray at 01/23/17 0830  •  glucagon (GLUCAGEN) injection 1 mg, 1 mg, Subcutaneous, Q15 Min PRN, Edwin Rizo MD  •  glucagon (GLUCAGEN) injection 1 mg, 1 mg, Subcutaneous, Q15 Min PRN, Shailesh Worthy MD  •  guaiFENesin (MUCINEX) 12 hr tablet 600 mg, 600 mg, Oral, BID, Edwin Rizo MD, 600 mg at 01/23/17 0831  •  insulin aspart (novoLOG) injection 0-7 Units, 0-7 Units, Subcutaneous, 4x Daily AC & at Bedtime, Shailesh Worthy MD, 3 Units at 01/23/17 1155  •  insulin aspart (novoLOG) injection 3 Units, 3 Units, Subcutaneous, TID With Meals, Edwin Rizo MD, 3 Units at 01/23/17 1201  •  insulin detemir (LEVEMIR) injection 23 Units, 23 Units, Subcutaneous, Q12H, Edwin Rizo MD, 23 Units at 01/23/17 0844  •  ipratropium-albuterol (DUO-NEB) nebulizer solution 3 mL, 3 mL, Nebulization, Q4H PRN, Edwin Rizo MD, 3 mL at 01/22/17 0809  •  isosorbide dinitrate (ISORDIL) tablet 10 mg, 10 mg, Oral, TID, Edwin Rizo MD, 10 mg at 01/23/17 1535  •  lactobacillus (BACID) caplet 1 tablet, 1 tablet, Oral, BID, Edwin Rizo MD, 1 tablet at 01/23/17 0831  •  montelukast (SINGULAIR) tablet 10 mg, 10 mg, Oral, Nightly, Edwin Rizo MD, 10 mg at 01/22/17 2006  •  nitroglycerin (NITROSTAT) SL tablet 0.4 mg, 0.4 mg, Sublingual, Q5 Min PRN, Shailesh Worthy MD  •  ondansetron (ZOFRAN) injection 4 mg, 4 mg, Intravenous, Q6H PRN, Edwin Rizo MD  •  [DISCONTINUED] vancomycin IVPB 1250 mg in 250 mL NS (premix), 1,250 mg, Intravenous, Once, 1,250 mg at 01/22/17 0305 **AND** Pharmacy to dose vancomycin, , Does not apply, Continuous PRN, Edwin Rizo MD  •  potassium chloride (KLOR-CON) packet 20 mEq, 20 mEq, Oral, Daily, Edwin Rizo MD, 20  mEq at 01/23/17 0830  •  senna (SENOKOT) tablet 8.6 mg, 1 tablet, Oral, BID, Edwin Rizo MD, 8.6 mg at 01/23/17 0830  •  SITagliptin (JANUVIA) tablet 100 mg, 100 mg, Oral, Daily, Edwin Rizo MD, 100 mg at 01/23/17 0830  •  sodium chloride 0.9 % flush 1-10 mL, 1-10 mL, Intravenous, PRN, Edwin Rizo MD  •  Insert peripheral IV, , , Once **AND** sodium chloride 0.9 % flush 10 mL, 10 mL, Intravenous, PRN, Melvin Hawkins MD  •  vancomycin (VANCOCIN) 1,250 mg in sodium chloride 0.9 % 250 mL IVPB, 1,250 mg, Intravenous, Q12H, Edwin Rizo MD, 1,250 mg at 01/23/17 1521      Assessment/Plan     1. Sepsis secondary to RLL Pneumonia: Concern for recurrent aspiration. PCT is low and WBC is now WNL. LA was mildly elevated at admit but is now WNL. Pulmonary following. Patient on broad spectrum Abx's. Will start to de-escalate.  D/C aztreonam and continue levoquin and vanco for now, if MRSA screen negative then D/C vanco tomorrow.  Will discuss with pulmonary.  So far blood cultures NGTD and viral panel is negative. Patient afebrile.  Nursing contacted NH and notes patient was started on nectar thick liquids 1/15 but then changed back to thins 1/18.  Unclear why this was changed.  Will call NH tomorrow to re-confirm and determine circumstances of this change. Speech therapy following here.    2. Hypernatremia and hypokalemia: hypernatremia resolved with D5W. Will decrease rate. Monitor. Secondary to poor po intake.  Will have nutrition to follow. Replace potassium po, check magnesium level.    3. Chronic urinary retention secondary to BPH: appears patient with chronic fuller. Urine culture 1/4 negative.  Will verify with NH that fuller is chronic, I do not see order that this was initiated here.    4. Dementia: No acute issues. Patient confused in conversation. On no chronic meds for this.    5. Diabetes Mellitis type 2: On Basal bolus insulin with doses reduced from what he was on  outpatient and on po januvia. Will adjust as needed and continue SSI and Accu checks.    6. Fecal impaction and constipation: disimpacted in ER. Needs good bowel regimen. On senokot BID and colace, will add miralax daily.    7. COPD: No acute issues here. No wheezes on exam. On home pulmicort and started on Brovana by pulmonary today. Continue duonebs PRN.    8. Hypertension: BP borderline elevated last checked, on no chronic meds for this outpatient. Monitor.    9. Dyslipidemia: on statin.    10. CAD: On home imdur and statin.  No acute issues here.    11. H/O CHF: Unclear type, patient not on ACEI/ARB, diuretic or BB so I suspect this is diastolic.  No acute issues here.     12. Vitamin D deficiency: On supplementation.    13. GERD: On home pepcid, no acute issues.    14. Chronic Iron deficiency Anemia: On home iron supplementation.    15. Femoral DVT: unclear how recent, will confirm with NH tomorrow, patient on therapeutic lovenox which is what he was on as an outpatient prior to admission.    Plan for disposition: Julio Wiley MD  01/23/17  3:59 PM

## 2017-01-24 ENCOUNTER — APPOINTMENT (OUTPATIENT)
Dept: GENERAL RADIOLOGY | Facility: HOSPITAL | Age: 82
End: 2017-01-24

## 2017-01-24 LAB
ANION GAP SERPL CALCULATED.3IONS-SCNC: 8.5 MMOL/L
BUN BLD-MCNC: 9 MG/DL (ref 8–23)
BUN/CREAT SERPL: 14.1 (ref 7–25)
CALCIUM SPEC-SCNC: 8.2 MG/DL (ref 8.8–10.5)
CHLORIDE SERPL-SCNC: 101 MMOL/L (ref 98–107)
CO2 SERPL-SCNC: 25.5 MMOL/L (ref 22–29)
CREAT BLD-MCNC: 0.64 MG/DL (ref 0.76–1.27)
GFR SERPL CREATININE-BSD FRML MDRD: 120 ML/MIN/1.73
GLUCOSE BLD-MCNC: 159 MG/DL (ref 65–99)
GLUCOSE BLDC GLUCOMTR-MCNC: 168 MG/DL (ref 70–130)
GLUCOSE BLDC GLUCOMTR-MCNC: 181 MG/DL (ref 70–130)
GLUCOSE BLDC GLUCOMTR-MCNC: 253 MG/DL (ref 70–130)
GLUCOSE BLDC GLUCOMTR-MCNC: 82 MG/DL (ref 70–130)
MAGNESIUM SERPL-MCNC: 2 MG/DL (ref 1.7–2.5)
MRSA SPEC QL CULT: ABNORMAL
POTASSIUM BLD-SCNC: 3.7 MMOL/L (ref 3.5–5.2)
SODIUM BLD-SCNC: 135 MMOL/L (ref 136–145)

## 2017-01-24 PROCEDURE — 63710000001 INSULIN ASPART PER 5 UNITS: Performed by: INTERNAL MEDICINE

## 2017-01-24 PROCEDURE — 83735 ASSAY OF MAGNESIUM: CPT | Performed by: HOSPITALIST

## 2017-01-24 PROCEDURE — 25010000002 VANCOMYCIN PER 500 MG: Performed by: INTERNAL MEDICINE

## 2017-01-24 PROCEDURE — 74230 X-RAY XM SWLNG FUNCJ C+: CPT

## 2017-01-24 PROCEDURE — 94760 N-INVAS EAR/PLS OXIMETRY 1: CPT

## 2017-01-24 PROCEDURE — 94799 UNLISTED PULMONARY SVC/PX: CPT

## 2017-01-24 PROCEDURE — 25010000002 VANCOMYCIN 750 MG RECONSTITUTED SOLUTION 1 EACH VIAL: Performed by: INTERNAL MEDICINE

## 2017-01-24 PROCEDURE — 92611 MOTION FLUOROSCOPY/SWALLOW: CPT

## 2017-01-24 PROCEDURE — 99232 SBSQ HOSP IP/OBS MODERATE 35: CPT | Performed by: HOSPITALIST

## 2017-01-24 PROCEDURE — 80048 BASIC METABOLIC PNL TOTAL CA: CPT | Performed by: HOSPITALIST

## 2017-01-24 PROCEDURE — 82962 GLUCOSE BLOOD TEST: CPT

## 2017-01-24 PROCEDURE — 97110 THERAPEUTIC EXERCISES: CPT

## 2017-01-24 PROCEDURE — 94640 AIRWAY INHALATION TREATMENT: CPT

## 2017-01-24 PROCEDURE — 25010000002 ENOXAPARIN PER 10 MG: Performed by: INTERNAL MEDICINE

## 2017-01-24 PROCEDURE — 97530 THERAPEUTIC ACTIVITIES: CPT

## 2017-01-24 PROCEDURE — 63710000001 INSULIN DETEMIR PER 5 UNITS: Performed by: INTERNAL MEDICINE

## 2017-01-24 PROCEDURE — 25010000002 LEVOFLOXACIN PER 250 MG: Performed by: INTERNAL MEDICINE

## 2017-01-24 RX ADMIN — FAMOTIDINE 20 MG: 20 TABLET, FILM COATED ORAL at 08:51

## 2017-01-24 RX ADMIN — INSULIN ASPART 4 UNITS: 100 INJECTION, SOLUTION INTRAVENOUS; SUBCUTANEOUS at 12:44

## 2017-01-24 RX ADMIN — INSULIN ASPART 2 UNITS: 100 INJECTION, SOLUTION INTRAVENOUS; SUBCUTANEOUS at 21:27

## 2017-01-24 RX ADMIN — ARFORMOTEROL TARTRATE 15 MCG: 15 SOLUTION RESPIRATORY (INHALATION) at 19:54

## 2017-01-24 RX ADMIN — DOCUSATE SODIUM 100 MG: 100 CAPSULE, LIQUID FILLED ORAL at 08:51

## 2017-01-24 RX ADMIN — INSULIN DETEMIR 23 UNITS: 100 INJECTION, SOLUTION SUBCUTANEOUS at 21:27

## 2017-01-24 RX ADMIN — POLYETHYLENE GLYCOL (3350) 17 G: 17 POWDER, FOR SOLUTION ORAL at 09:12

## 2017-01-24 RX ADMIN — Medication 1 TABLET: at 17:21

## 2017-01-24 RX ADMIN — INSULIN ASPART 3 UNITS: 100 INJECTION, SOLUTION INTRAVENOUS; SUBCUTANEOUS at 17:20

## 2017-01-24 RX ADMIN — SENNOSIDES 8.6 MG: 8.6 TABLET, FILM COATED ORAL at 17:20

## 2017-01-24 RX ADMIN — SENNOSIDES 8.6 MG: 8.6 TABLET, FILM COATED ORAL at 08:51

## 2017-01-24 RX ADMIN — POTASSIUM CHLORIDE 20 MEQ: 1.5 POWDER, FOR SOLUTION ORAL at 08:51

## 2017-01-24 RX ADMIN — BACLOFEN 10 MG: 10 TABLET ORAL at 21:31

## 2017-01-24 RX ADMIN — FLUTICASONE PROPIONATE 2 SPRAY: 50 SPRAY, METERED NASAL at 08:51

## 2017-01-24 RX ADMIN — BUDESONIDE 0.5 MG: 0.5 SUSPENSION RESPIRATORY (INHALATION) at 19:50

## 2017-01-24 RX ADMIN — LEVOFLOXACIN 500 MG: 500 INJECTION, SOLUTION INTRAVENOUS at 04:23

## 2017-01-24 RX ADMIN — MONTELUKAST SODIUM 10 MG: 10 TABLET, FILM COATED ORAL at 21:31

## 2017-01-24 RX ADMIN — BUDESONIDE 0.5 MG: 0.5 SUSPENSION RESPIRATORY (INHALATION) at 10:40

## 2017-01-24 RX ADMIN — IPRATROPIUM BROMIDE AND ALBUTEROL SULFATE 3 ML: .5; 3 SOLUTION RESPIRATORY (INHALATION) at 10:48

## 2017-01-24 RX ADMIN — SITAGLIPTIN 100 MG: 100 TABLET, FILM COATED ORAL at 08:51

## 2017-01-24 RX ADMIN — ENOXAPARIN SODIUM 80 MG: 80 INJECTION SUBCUTANEOUS at 21:31

## 2017-01-24 RX ADMIN — VANCOMYCIN HYDROCHLORIDE 1250 MG: 500 INJECTION, POWDER, LYOPHILIZED, FOR SOLUTION INTRAVENOUS at 14:44

## 2017-01-24 RX ADMIN — ENOXAPARIN SODIUM 80 MG: 80 INJECTION SUBCUTANEOUS at 08:51

## 2017-01-24 RX ADMIN — BACLOFEN 10 MG: 10 TABLET ORAL at 08:52

## 2017-01-24 RX ADMIN — VANCOMYCIN HYDROCHLORIDE 1250 MG: 500 INJECTION, POWDER, LYOPHILIZED, FOR SOLUTION INTRAVENOUS at 02:39

## 2017-01-24 RX ADMIN — GUAIFENESIN 600 MG: 600 TABLET, EXTENDED RELEASE ORAL at 08:51

## 2017-01-24 RX ADMIN — INSULIN ASPART 3 UNITS: 100 INJECTION, SOLUTION INTRAVENOUS; SUBCUTANEOUS at 09:12

## 2017-01-24 RX ADMIN — FERROUS SULFATE TAB EC 324 MG (65 MG FE EQUIVALENT) 324 MG: 324 (65 FE) TABLET DELAYED RESPONSE at 17:20

## 2017-01-24 RX ADMIN — INSULIN ASPART 2 UNITS: 100 INJECTION, SOLUTION INTRAVENOUS; SUBCUTANEOUS at 17:21

## 2017-01-24 RX ADMIN — ATORVASTATIN CALCIUM 20 MG: 20 TABLET, FILM COATED ORAL at 08:52

## 2017-01-24 RX ADMIN — Medication 1 TABLET: at 08:52

## 2017-01-24 RX ADMIN — BACLOFEN 10 MG: 10 TABLET ORAL at 17:20

## 2017-01-24 RX ADMIN — FAMOTIDINE 20 MG: 20 TABLET, FILM COATED ORAL at 17:20

## 2017-01-24 RX ADMIN — DOCUSATE SODIUM 100 MG: 100 CAPSULE, LIQUID FILLED ORAL at 17:20

## 2017-01-24 RX ADMIN — ISOSORBIDE DINITRATE 10 MG: 10 TABLET ORAL at 21:31

## 2017-01-24 RX ADMIN — ISOSORBIDE DINITRATE 10 MG: 10 TABLET ORAL at 17:20

## 2017-01-24 RX ADMIN — INSULIN ASPART 3 UNITS: 100 INJECTION, SOLUTION INTRAVENOUS; SUBCUTANEOUS at 12:44

## 2017-01-24 RX ADMIN — INSULIN DETEMIR 23 UNITS: 100 INJECTION, SOLUTION SUBCUTANEOUS at 09:13

## 2017-01-24 RX ADMIN — FERROUS SULFATE TAB EC 324 MG (65 MG FE EQUIVALENT) 324 MG: 324 (65 FE) TABLET DELAYED RESPONSE at 08:51

## 2017-01-24 RX ADMIN — ARFORMOTEROL TARTRATE 15 MCG: 15 SOLUTION RESPIRATORY (INHALATION) at 10:45

## 2017-01-24 RX ADMIN — ISOSORBIDE DINITRATE 10 MG: 10 TABLET ORAL at 08:51

## 2017-01-24 RX ADMIN — GUAIFENESIN 600 MG: 600 TABLET, EXTENDED RELEASE ORAL at 17:20

## 2017-01-24 NOTE — PROGRESS NOTES
Acute Care - Physical Therapy Treatment Note   Brianne Lowe     Patient Name: Chance Bocanegra  : 1935  MRN: [id number]  Today's Date: 2017  Onset of Illness/Injury or Date of Surgery Date: 17  Date of Referral to PT: 17  Referring Physician: Dr. Rizo    Admit Date: 2017    Visit Dx:    ICD-10-CM ICD-9-CM   1. Pneumonia of right lower lobe due to infectious organism J18.9 483.8   2. Sepsis, due to unspecified organism A41.9 038.9     995.91   3. Urinary retention R33.9 788.20   4. Prostatic hypertrophy N40.0 600.90   5. Hyperglycemia R73.9 790.29   6. Fecal impaction K56.41 560.32   7. Oropharyngeal dysphagia R13.12 787.22     Patient Active Problem List   Diagnosis   • Pneumonia of right lower lobe due to infectious organism               Adult Rehabilitation Note       17 0904 17 0903 17 0920    Rehab Assessment/Intervention    Discipline occupational therapist  -EN physical therapy assistant  -KM physical therapist  -    Document Type therapy note (daily note)  -EN therapy note (daily note)  -KM therapy note (daily note)  -    Subjective Information agree to therapy;complains of;pain   left leg  -EN agree to therapy;complains of;pain   left leg- issues for  15 years he says  -KM agree to therapy;complains of;pain  -LH    Patient Effort, Rehab Treatment adequate  -EN adequate  -KM fair  -LH    Symptoms Noted During/After Treatment increased pain   in left leg with movement  -EN increased pain   in LLE  -KM fatigue  -LH    Precautions/Limitations fall precautions  -EN fall precautions  -KM     Recorded by [EN] Mireya Carbone OTR [KM] Almita Davila, PTA [] Christina Acevedo, PT    Pain Assessment    Pain Assessment 0-10  -EN --   reports pain but unable to rate  -KM --   c/o generalized pain in arms/legs, unable to rate   -LH    Pain Score --   unable to place numeric value  -EN      Pain Location Leg  -EN Leg  -KM     Pain Orientation Left  -EN Left  -KM      Pain Intervention(s) Repositioned   nursing notified  -EN Medication (See MAR);Repositioned   nurse notified  -KM     Recorded by [EN] Mireya Carbone, OTR [KM] Almita Davila PTA [] Christina Acevedo PT    Cognitive Assessment/Intervention    Current Cognitive/Communication Assessment impaired  -EN impaired  -KM     Orientation Status oriented to;person  -EN oriented to;person  -KM     Follows Commands/Answers Questions 25% of the time;able to follow single-step instructions;needs cueing;needs increased time;needs repetition  -EN      Personal Safety severe impairment;decreased awareness, need for assist;decreased awareness, need for safety  -EN      Personal Safety Interventions gait belt;nonskid shoes/slippers when out of bed  -EN gait belt;nonskid shoes/slippers when out of bed  -KM     Recorded by [EN] Mireya Carbone, OTR [KM] Almita Davila PTA     Bed Mobility, Assessment/Treatment    Bed Mobility, Assistive Device bed rails;head of bed elevated;draw sheet  -EN bed rails;head of bed elevated;draw sheet  -KM bed rails;head of bed elevated  -    Bed Mobility, Roll Left, [place] maximum assist (25% patient effort);2 person assist required  -EN maximum assist (25% patient effort);2 person assist required  -KM     Bed Mobility, Roll Right, [place] maximum assist (25% patient effort);2 person assist required  -EN maximum assist (25% patient effort);2 person assist required  -KM     Bed Mobility, Scoot/Bridge, [place] dependent (less than 25% patient effort)  -EN dependent (less than 25% patient effort);2 person assist required  -KM     Bed Mob, Supine to Sit, [place] maximum assist (25% patient effort);2 person assist required  -EN maximum assist (25% patient effort);2 person assist required  -KM maximum assist (25% patient effort);2 person assist required;verbal cues required  -    Bed Mob, Sit to Supine, [place] maximum assist (25% patient effort);2 person assist required   -EN maximum assist (25% patient effort);2 person assist required  -KM maximum assist (25% patient effort);dependent (less than 25% patient effort);2 person assist required;verbal cues required  -LH    Bed Mobility, Comment Patient required max assist initally to sit at EOB, however after 1-2 minutes sat with CGA for approximately 1 minute.  -EN pt initially leaning backwards with sitting on the side of bed but was able to sit with CG of one for 1-2 minutes once positioned  -KM Patient sat edge of bed x 5 minutes.  Initial Loss of balance left, requiring min A, however improved with time, progressing to close SBA x 3 minutes.  -LH    Recorded by [EN] Mireya Carbone, OTR [KM] Almita Davila PTA [LH] Christina Acevedo, PT    Transfer Assessment/Treatment    Transfer, Comment Attempted sit to stand, pt unalbe to clear buttocks from bed, held left leg in air and would not WB left leg due to pain.  -EN attempted sit-stand with pt- pt not putting weight on LLE and limited standing on RLE(could not clear the bed) pt then states he needed to use bathroom and returned pt to assist pca with cleanup  -KM unsafet to attempt transfers at this time due to poor sitting balance, decreased safety  -LH    Recorded by [EN] Mireya Carbone, OTR [KM] Almita Davila PTA [LH] Christina Acevedo, PT    Gait Assessment/Treatment    Gait, Comment  not appropriate at this time-   -KM     Recorded by  [KM] Almita Davila PTA     Toileting Assessment/Training    Toileting Assess/Train, Indepen Level dependent (less than 25% patient effort)  -EN dependent (less than 25% patient effort)  -KM     Toileting Assess/Train, Comment total assist for toileting  -EN      Recorded by [EN] Mireya Carbone, OTR [KM] Almita Davila PTA     Positioning and Restraints    Pre-Treatment Position in bed  -EN in bed  -KM in bed  -LH    Post Treatment Position chair  -EN bed  -KM bed  -LH    In Bed notified nsg;supine;call light within reach;with  nsg;exit alarm on  -EN side rails up x2;notified nsg;call light within reach;with nsg  -KM notified nsg;call light within reach;encouraged to call for assist;exit alarm on;side lying right   patient c/o pain in buttock.  Nurse notified  -    Recorded by [EN] Mireay Carbone, OTR [KM] Almita Davila, PTA [LH] Christina Acevedo, PT      User Key  (r) = Recorded By, (t) = Taken By, (c) = Cosigned By    Initials Name Effective Dates     Christina Acevedo, PT 08/11/15 -     KM Almita Davila, PTA 08/11/15 -     EN Mireya Carbone, OTR 06/22/16 -                 IP PT Goals       01/22/17 1128          Bed Mobility PT STG    Bed Mobility PT STG, Date Established 01/22/17  -SP      Bed Mobility PT STG, Time to Achieve 3 days  -SP      Bed Mobility PT STG, Activity Type all bed mobility  -SP      Bed Mobility PT STG, [place] minimum assist (75% patient effort)  -SP      Transfer Training PT STG    Transfer Training PT STG, Date Established 01/22/17  -SP      Transfer Training PT STG, Time to Achieve 5 days  -SP      Transfer Training PT STG, Activity Type bed to chair /chair to bed  -SP      Transfer Training PT STG, [place] Level minimum assist (75% patient effort)  -SP      Transfer Training PT LTG    Transfer Training PT LTG, Date Established 01/22/17  -SP      Transfer Training PT LTG, Activity Type sit to stand/stand to sit  -SP      Transfer Training PT LTG, [place] Level minimum assist (75% patient effort)  -SP        User Key  (r) = Recorded By, (t) = Taken By, (c) = Cosigned By    Initials Name Provider Type    SP Elda Ramirez, PT Physical Therapist          Physical Therapy Education     Title: PT OT SLP Therapies (Active)     Topic: Physical Therapy (Done)     Point: Mobility training (Done)    Learning Progress Summary    Learner Readiness Method Response Comment Documented by Status   Patient Acceptance E,D VU   01/24/17 1123 Done    Acceptance E [facility] 01/23/17 1041  Done    Acceptance E VU,NR transfers and importance of activity SP 01/22/17 1121 Done                      User Key     Initials Effective Dates Name Provider Type Discipline     08/11/15 -  Christina Acevedo, PT Physical Therapist PT    KM 08/11/15 -  Almita Davila, PTA Physical Therapy Assistant PT    SP 08/11/15 -  Elda Ramirez, PT Physical Therapist PT                    PT Recommendation and Plan  Planned Therapy Interventions: balance training, bed mobility training, transfer training  PT Frequency: daily, 5 times/wk  Plan of Care Review  Plan Of Care Reviewed With: patient  Progress: no change  Outcome Summary/Follow up Plan: PT: Pt requires max of two for bed mobility.  Pt unable to stand- could not clear bed and pt would not weightbear with LLE.  May need to use colleen lift for transfers OOB.          Outcome Measures       01/24/17 0904 01/24/17 0903 01/23/17 1300    How much help from another person do you currently need...    Turning from your back to your side while in flat bed without using bedrails?  1  -KM     Moving from lying on back to sitting on the side of a flat bed without bedrails?  1  -KM     Moving to and from a bed to a chair (including a wheelchair)?  1  -KM     Standing up from a chair using your arms (e.g., wheelchair, bedside chair)?  1  -KM     Climbing 3-5 steps with a railing?  1  -KM     To walk in hospital room?  1  -KM     AM-PAC 6 Clicks Score  6  -KM     How much help from another is currently needed...    Putting on and taking off regular lower body clothing? 1  -EN      Bathing (including washing, rinsing, and drying) 1  -EN      Toileting (which includes using toilet bed pan or urinal) 1  -EN      Putting on and taking off regular upper body clothing 2  -EN      Taking care of personal grooming (such as brushing teeth) 3  -EN      Eating meals 3  -EN      Score 11  -EN      Functional Assessment    Outcome Measure Options  AM-PAC 6 Clicks Basic Mobility (PT)  -KM  AM-[facility] 6 Clicks Daily Activity (OT)  -EN      01/23/17 1000 01/23/17 0918 01/22/17 1000    How much help from another person do you currently need...    Turning from your back to your side while in flat bed without using bedrails? 2  -LH  2  -SP    Moving from lying on back to sitting on the side of a flat bed without bedrails? 1  -  1  -SP    Moving to and from a bed to a chair (including a wheelchair)? 1  -  1  -SP    Standing up from a chair using your arms (e.g., wheelchair, bedside chair)? 1  -  1  -SP    Climbing 3-5 steps with a railing? 1  -  1  -SP    To walk in hospital room? 1  -  1  -SP    AM-PAC 6 Clicks Score 7  -  7  -SP    How much help from another is currently needed...    Putting on and taking off regular lower body clothing?  2  -EN     Bathing (including washing, rinsing, and drying)  2  -EN     Toileting (which includes using toilet bed pan or urinal)  2  -EN     Putting on and taking off regular upper body clothing  2  -EN     Taking care of personal grooming (such as brushing teeth)  3  -EN     Eating meals  3  -EN     Score  14  -EN     Functional Assessment    Outcome Measure Options AM-PAC 6 Clicks Basic Mobility (PT)  -[facility] 6 Clicks Basic Mobility (PT)  -      User Key  (r) = Recorded By, (t) = Taken By, (c) = Cosigned By    Initials Name Provider Type     Christina Acevedo, PT Physical Therapist    JOSE Davila PTA Physical Therapy Assistant    ALYSSA Ramirez, PT Physical Therapist    EN Mireya Carbone, OTR Occupational Therapist           Time Calculation:         PT Charges       01/24/17 1126          Time Calculation    Start Time 0903  -KM      Stop Time 0920  -KM      Time Calculation (min) 17 min  -KM      PT - Next Appointment 01/25/17  -KM        User Key  (r) = Recorded By, (t) = Taken By, (c) = Cosigned By    Initials Name Provider Type    JOSE Davila PTA Physical Therapy Assistant          Therapy Charges for Today     Code  Description Service Date Service Provider Modifiers Qty    86381843161 HC PT THER PROC EA 15 MIN 1/24/2017 Almita Davila, PTA GP 1          PT G-Codes  Outcome Measure Options: AM-PAC 6 Clicks Basic Mobility (PT)  Changing and Maintaining Body Position Current Status (): 100 percent impaired, limited or restricted  Changing and Maintaining Body Position Goal Status (): At least 40 percent but less than 60 percent impaired, limited or restricted    Almita Davila, PTA  1/24/2017

## 2017-01-24 NOTE — PROGRESS NOTES
Acute Care - Occupational Therapy Treatment Note   Brianne Lowe     Patient Name: Chance Bocanegra  : 1935  MRN: [id number]  Today's Date: 2017  Onset of Illness/Injury or Date of Surgery Date: 17     Referring Physician: Dr. Rizo      Admit Date: 2017    Visit Dx:     ICD-10-CM ICD-9-CM   1. Pneumonia of right lower lobe due to infectious organism J18.9 483.8   2. Sepsis, due to unspecified organism A41.9 038.9     995.91   3. Urinary retention R33.9 788.20   4. Prostatic hypertrophy N40.0 600.90   5. Hyperglycemia R73.9 790.29   6. Fecal impaction K56.41 560.32   7. Oropharyngeal dysphagia R13.12 787.22     Patient Active Problem List   Diagnosis   • Pneumonia of right lower lobe due to infectious organism             Adult Rehabilitation Note       17 0904 17 0920       Rehab Assessment/Intervention    Discipline occupational therapist  -EN physical therapist  -     Document Type therapy note (daily note)  -EN therapy note (daily note)  -     Subjective Information agree to therapy;complains of;pain   left leg  -EN agree to therapy;complains of;pain  -LH     Patient Effort, Rehab Treatment adequate  -EN fair  -     Symptoms Noted During/After Treatment increased pain   in left leg with movement  -EN fatigue  -     Precautions/Limitations fall precautions  -EN      Recorded by [EN] Mireya Carbone, OTR [LH] Christina Acevedo, ANGELINA     Pain Assessment    Pain Assessment 0-10  -EN --   c/o generalized pain in arms/legs, unable to rate   -     Pain Score --   unable to place numeric value  -EN      Pain Location Leg  -EN      Pain Orientation Left  -EN      Pain Intervention(s) Repositioned   nursing notified  -EN      Recorded by [EN] Mireya Carbone, OTR [] Christina Acevedo, ANGELINA     Cognitive Assessment/Intervention    Current Cognitive/Communication Assessment impaired  -EN      Orientation Status oriented to;person  -EN      Follows Commands/Answers Questions  25% of the time;able to follow single-step instructions;needs cueing;needs increased time;needs repetition  -EN      Personal Safety severe impairment;decreased awareness, need for assist;decreased awareness, need for safety  -EN      Personal Safety Interventions gait belt;nonskid shoes/slippers when out of bed  -EN      Recorded by [EN] Mireya Carbone OTR      Bed Mobility, Assessment/Treatment    Bed Mobility, Assistive Device bed rails;head of bed elevated;draw sheet  -EN bed rails;head of bed elevated  -     Bed Mobility, Roll Left, [place] maximum assist (25% patient effort);2 person assist required  -EN      Bed Mobility, Roll Right, [place] maximum assist (25% patient effort);2 person assist required  -EN      Bed Mobility, Scoot/Bridge, [place] dependent (less than 25% patient effort)  -EN      Bed Mob, Supine to Sit, [place] maximum assist (25% patient effort);2 person assist required  -EN maximum assist (25% patient effort);2 person assist required;verbal cues required  -     Bed Mob, Sit to Supine, [place] maximum assist (25% patient effort);2 person assist required  -EN maximum assist (25% patient effort);dependent (less than 25% patient effort);2 person assist required;verbal cues required  -     Bed Mobility, Comment Patient required max assist initally to sit at EOB, however after 1-2 minutes sat with CGA for approximately 1 minute.  -EN Patient sat edge of bed x 5 minutes.  Initial Loss of balance left, requiring min A, however improved with time, progressing to close SBA x 3 minutes.  -LH     Recorded by [EN] Mireya Carbone, OTR [LH] Christina Acevedo, PT     Transfer Assessment/Treatment    Transfer, Comment Attempted sit to stand, pt unalbe to clear buttocks from bed, held left leg in air and would not WB due to  left leg pain.  -EN unsafet to attempt transfers at this time due to poor sitting balance, decreased safety  -LH     Recorded by [EN] Mireya  MARQUITA Carbone OTR [LH] Christina Acevedo PT     Toileting Assessment/Training    Toileting Assess/Train, Indepen Level dependent (less than 25% patient effort)  -EN      Toileting Assess/Train, Comment total assist for toileting  -EN      Recorded by [EN] ELENA Cesar      Positioning and Restraints    Pre-Treatment Position in bed  -EN in bed  -LH     Post Treatment Position chair  -EN bed  -LH     In Bed notified nsg;supine;call light within reach;with nsg;exit alarm on  -EN notified nsg;call light within reach;encouraged to call for assist;exit alarm on;side lying right   patient c/o pain in buttock.  Nurse notified  -LH     Recorded by [EN] Mireya Carbone OTR [LH] Christina Acevedo PT       User Key  (r) = Recorded By, (t) = Taken By, (c) = Cosigned By    Initials Name Effective Dates     Christina Acevedo, ANGELINA 08/11/15 -     EN ELENA Cesar 06/22/16 -                 OT Goals       01/23/17 1316          Transfer Training OT STG    Transfer Training OT STG, Date Established 01/16/17  -EN      Transfer Training OT STG, Time to Achieve 3 days  -EN      Transfer Training OT STG, Activity Type sit to stand/stand to sit  -EN      Transfer Training OT STG, [place] Level moderate assist (50% patient effort)  -EN      Transfer Training OT STG, Assist Device walker, standard   for improved ind with transfers to w/c  -EN      Dynamic Sitting Balance OT LTG    Dynamic Sitting Balance OT LTG, Date Established 01/23/17  -EN      Dynamic Sitting Balance OT LTG, Time to Achieve 3 days  -EN      Dynamic Sitting Balance OT LTG, [place] Level minimum assist (75% patient effort)   3-5 minutes for improved ADL ind  -EN      Dynamic Sitting Balance OT LTG, Assist Device UE Support  -EN        User Key  (r) = Recorded By, (t) = Taken By, (c) = Cosigned By    Initials Name Provider Type    EN ELENA Cesar Occupational Therapist          Occupational Therapy Education     Title: PT  OT SLP Therapies (Active)     Topic: Occupational Therapy (Active)     Point: ADL training (Active)    Description: Instruct learner(s) on proper safety adaptation and remediation techniques during self care or transfers.   Instruct in proper use of assistive devices.    Learning Progress Summary    Learner Readiness Method Response Comment Documented by Status   Patient Acceptance E NR,NL Pt educated on bed mobility and safety with standing. Pt with severe cognitive deficits and showed no evidence of learning. EN 01/24/17 0904 Active    Acceptance E NR pt educated on benefits of activity and bed mobility. EN 01/23/17 1315 Active                      User Key     Initials Effective Dates Name Provider Type Discipline    EN 06/22/16 -  Mireya Carbone OTR Occupational Therapist OT                  OT Recommendation and Plan  Anticipated Discharge Disposition: extended care facility  Planned Therapy Interventions: transfer training, balance training  Therapy Frequency: 3-5 times/wk  Plan of Care Review  Plan Of Care Reviewed With: patient  Progress: no change  Outcome Summary/Follow up Plan: OT- Patient required max assist of 2 for bed mobility, dependent for scooting to  EOB.  Pt with severe cognitive deficits and required significant cue to follow single commands. Pt initially required max assist for static sitting balance, however after several minutes required CGA and sat approximately 1 minute. Attempted sit to stand, however pt unable to clear buttocks from bed and held left leg in flexion and would not WB due to pain.        Outcome Measures       01/24/17 0904 01/23/17 1300 01/23/17 1000    How much help from another person do you currently need...    Turning from your back to your side while in flat bed without using bedrails?   2  -LH    Moving from lying on back to sitting on the side of a flat bed without bedrails?   1  -LH    Moving to and from a bed to a chair (including a wheelchair)?   1  -LH     Standing up from a chair using your arms (e.g., wheelchair, bedside chair)?   1  -LH    Climbing 3-5 steps with a railing?   1  -LH    To walk in hospital room?   1  -LH    AM-PAC 6 Clicks Score   7  -LH    How much help from another is currently needed...    Putting on and taking off regular lower body clothing? 1  -EN      Bathing (including washing, rinsing, and drying) 1  -EN      Toileting (which includes using toilet bed pan or urinal) 1  -EN      Putting on and taking off regular upper body clothing 2  -EN      Taking care of personal grooming (such as brushing teeth) 3  -EN      Eating meals 3  -EN      Score 11  -EN      Functional Assessment    Outcome Measure Options  AM-PAC 6 Clicks Daily Activity (OT)  -EN AM-PAC 6 Clicks Basic Mobility (PT)  -      01/23/17 0918 01/22/17 1000       How much help from another person do you currently need...    Turning from your back to your side while in flat bed without using bedrails?  2  -SP     Moving from lying on back to sitting on the side of a flat bed without bedrails?  1  -SP     Moving to and from a bed to a chair (including a wheelchair)?  1  -SP     Standing up from a chair using your arms (e.g., wheelchair, bedside chair)?  1  -SP     Climbing 3-5 steps with a railing?  1  -SP     To walk in hospital room?  1  -SP     AM-PAC 6 Clicks Score  7  -SP     How much help from another is currently needed...    Putting on and taking off regular lower body clothing? 2  -EN      Bathing (including washing, rinsing, and drying) 2  -EN      Toileting (which includes using toilet bed pan or urinal) 2  -EN      Putting on and taking off regular upper body clothing 2  -EN      Taking care of personal grooming (such as brushing teeth) 3  -EN      Eating meals 3  -EN      Score 14  -EN      Functional Assessment    Outcome Measure Options  AM-PAC 6 Clicks Basic Mobility (PT)  -SP       User Key  (r) = Recorded By, (t) = Taken By, (c) = Cosigned By    Initials Name  Provider Type     Christina Acevedo, PT Physical Therapist    ALYSSA Ramirez, PT Physical Therapist    EN ELENA Cesar Occupational Therapist           Time Calculation:         Time Calculation- OT       01/24/17 1105          Time Calculation- OT    OT Start Time 0904  -EN      OT Stop Time 0928  -EN      OT Time Calculation (min) 24 min  -EN        User Key  (r) = Recorded By, (t) = Taken By, (c) = Cosigned By    Initials Name Provider Type    EN Mireya Carbone OTR Occupational Therapist           Therapy Charges for Today     Code Description Service Date Service Provider Modifiers Qty    55199490314  OT EVAL MOD COMPLEXITY 1 1/23/2017 ELENA Cesar GO 1    [id number]  OT THERAPEUTIC ACT EA 15 MIN 1/24/2017 ELENA Cesar GO 2               ELENA Rodas  1/24/2017

## 2017-01-24 NOTE — CONSULTS
Nutrition Services    Patient Name:  Chance Bocanegra  YOB: 1935  MRN: [id number]  Admit Date:  1/21/2017        Received consult for nutrition assessment.   Pt seen and assessed on 1/23/17. See full note for details.  Agree with change to Glucerna shake oral supplement to aid with BG control.   Noted SLP eval, change to NTL per recent FEES.    Will cont to follow and monitor.           Electronically signed by:  Thuy Godinez RD  01/24/17 11:04 AM

## 2017-01-24 NOTE — PLAN OF CARE
Problem: Patient Care Overview (Adult)  Goal: Plan of Care Review  Outcome: Ongoing (interventions implemented as appropriate)    01/24/17 1124   Coping/Psychosocial Response Interventions   Plan Of Care Reviewed With patient   Outcome Evaluation   Outcome Summary/Follow up Plan PT: Pt requires max of two for bed mobility. Pt unable to stand- could not clear bed and pt would not weightbear with LLE. May need to use colleen lift for transfers OOB.   Patient Care Overview   Progress no change

## 2017-01-24 NOTE — PROGRESS NOTES
"Hospitalist Team      Patient Care Team:  Thomas Amos MD as PCP - General  Thomas Amos MD as PCP - Family Medicine        Chief Complaint:  F/U sepsis secondary to pneumonia, fecal impaction, hypernatremia    Subjective    Interval History and ROS:     Patient denies any cough of SOA to me today.  Eating well per nursing staff.  Denies CP or N/V and patient afebrile. Remains confused in conversation which appears baseline with his underlying dementia. I contacted NH today about fuller, when DVT and diet changes.  See A/P below.    Objective    Vital Signs  Temp:  [97.5 °F (36.4 °C)-98.2 °F (36.8 °C)] 97.5 °F (36.4 °C)  Heart Rate:  [] 100  Resp:  [18-23] 18  BP: (106-167)/(64-93) 106/64  Oxygen Therapy  SpO2: 95 %  Pulse Oximetry Type: Continuous  O2 Device: room air  Flow (L/min): 2  Flowsheet Rows         First Filed Value    Admission Height  71\" (180.3 cm) Documented at 01/21/2017 2035    Admission Weight  125 lb (56.7 kg) Documented at 01/21/2017 2151            Physical Exam:  Constitutional: He is well-developed, well-nourished, and in no distress.   HENT:   Head: Normocephalic and atraumatic.   Eyes: EOM are normal. No scleral icterus.   Neck: Neck supple. No JVD present.   Cardiovascular: Normal rate, regular rhythm and normal heart sounds. Exam reveals no gallop and no friction rub.   No murmur heard.  Pulmonary/Chest: Effort normal. No respiratory distress. He has no wheezes. He has no rales that I appreciate on exam today.   Diminished breath sounds at the bases R>L   Abdominal: Soft. Bowel sounds are normal. He exhibits no distension. There is no tenderness.   Musculoskeletal: He exhibits no edema.   Neurological: He is alert.   Oriented to person and time but not place and confused in conversation.     Results Review:     I reviewed the patient's new clinical results.    Lab Results (last 24 hours)     Procedure Component Value Units Date/Time    POC Glucose Fingerstick [[id number]]  (Abnormal) " Collected:  01/21/17 2034    Specimen:  Blood Updated:  01/23/17 1141     Glucose 511 (C) mg/dL     Narrative:       Physician Notified Meter: [id number] : [id number] Matthias Infante    POC Glucose Fingerstick [[id number]]  (Abnormal) Collected:  01/23/17 1149    Specimen:  Blood Updated:  01/23/17 1205     Glucose 237 (H) mg/dL     Narrative:       Meter: [id number] : [id number] Esau BAILEY    Vancomycin, Random [[id number]] Collected:  01/23/17 1154    Specimen:  Blood Updated:  01/23/17 1219     Vancomycin Random 12.10 mcg/mL     Hepatitis C Antibody [[id number]]  (Normal) Collected:  01/21/17 2316    Specimen:  Blood Updated:  01/23/17 1358     Hepatitis C Ab Non-Reactive     POC Glucose Fingerstick [[id number]]  (Normal) Collected:  01/23/17 1635    Specimen:  Blood Updated:  01/23/17 1647     Glucose 115 mg/dL     Narrative:       Meter: [id number] : [id number] Yuriy Chavira    POC Glucose Fingerstick [[id number]]  (Abnormal) Collected:  01/23/17 2007    Specimen:  Blood Updated:  01/23/17 2014     Glucose 193 (H) mg/dL     Narrative:       Meter: [id number] : [id number] Sean Lezama    Blood Culture [[id number]]  (Normal) Collected:  01/21/17 2050    Specimen:  Blood from Blood, Venous Line Updated:  01/23/17 2201     Blood Culture No growth at 2 days     Blood Culture [[id number]]  (Normal) Collected:  01/21/17 2211    Specimen:  Blood from Blood, Venous Line Updated:  01/23/17 2301     Blood Culture No growth at 2 days     Magnesium [[id number]]  (Normal) Collected:  01/24/17 0353    Specimen:  Blood Updated:  01/24/17 0440     Magnesium 2.0 mg/dL     Basic Metabolic Panel [[id number]]  (Abnormal) Collected:  01/24/17 0353    Specimen:  Blood Updated:  01/24/17 0501     Glucose 159 (H) mg/dL      BUN 9 mg/dL      Creatinine 0.64 (L) mg/dL      Sodium 135 (L) mmol/L      Potassium 3.7 mmol/L      Chloride 101 mmol/L      CO2 25.5 mmol/L      Calcium 8.2 (L) mg/dL      eGFR Non  Amer 120  mL/min/1.73      BUN/Creatinine Ratio 14.1      Anion Gap 8.5 mmol/L     Narrative:       The MDRD GFR formula is only valid for adults with stable renal function between ages 18 and 70.    POC Glucose Fingerstick [[id number]]  (Normal) Collected:  01/24/17 0728    Specimen:  Blood Updated:  01/24/17 0739     Glucose 82 mg/dL     Narrative:       Meter: [id number] : [id number] Mings Teresita PCA    MRSA Screen [[id number]]  (Abnormal) Collected:  01/22/17 1115    Specimen:  Swab from Nares Updated:  01/24/17 0922     MRSA SCREEN CX Staphylococcus aureus, MRSA (C)         Methicillin resistant Staphylococcus aureus, Patient may be an isolation risk.             Imaging Results (last 24 hours)     ** No results found for the last 24 hours. **          ECG/EMG Results (most recent)     Procedure Component Value Units Date/Time    ECG 12 Lead [[id number]] Collected:  01/21/17 2041     Updated:  01/22/17 2057    Narrative:       RR Interval= 469 ms  KS Interval= 152 ms  QRSD Interval= 100 ms  QT Interval= 304 ms  QTc Interval= 444 ms  Heart Rate= 128 ms  P Axis= 44 deg  QRS Axis= 1 deg  T Wave Axis= 38 deg  I: 40 Axis= -56 deg  T: 40 Axis= 16 deg  ST Axis= 109 deg  SINUS TACHYCARDIA  VENTRICULAR PREMATURE COMPLEX  PROBABLE INFERIOR INFARCT, AGE INDETERMINATE  CONSIDER POSTERIOR WALL INVOLVEMENT  NO SIGNIFICANT CHANGE FROM PREVIOUS ECG  Electronically Signed by:  Hakw MelendrezMARQUITA) ([facility]) 22-Jan-2017 20:56:35  Date and Time of Study: 2017-01-21 20:41:55    ECG 12 Lead [[id number]] Collected:  01/22/17 1148     Updated:  01/22/17 2058    Narrative:       RR Interval= 600 ms  KS Interval= 160 ms  QRSD Interval= 100 ms  QT Interval= 372 ms  QTc Interval= 480 ms  Heart Rate= 100 ms  P Axis= 44 deg  QRS Axis= 8 deg  T Wave Axis= 20 deg  I: 40 Axis= -44 deg  T: 40 Axis= 57 deg  ST Axis= 239 deg  SINUS TACHYCARDIA  VENTRICULAR PREMATURE COMPLEX  INFERIOR INFARCT, AGE INDETERMINATE  BORDERLINE PROLONGED QT INTERVAL  NO SIGNIFICANT  CHANGE FROM PREVIOUS ECG  Electronically Signed by:  Hawk Melendrez) ([facility]) 22-Jan-2017 20:57:00  Date and Time of Study: 2017-01-22 11:48:12          Medication Review:   I have reviewed the patient's current medication list    Current Facility-Administered Medications:   •  acetaminophen-codeine (TYLENOL #3) 300-30 MG per tablet 1 tablet, 1 tablet, Oral, Q6H PRN, Edwin Rizo MD, 1 tablet at 01/22/17 2006  •  arformoterol (BROVANA) nebulizer solution 15 mcg, 15 mcg, Nebulization, BID - RT, Alec Bolton MD, 15 mcg at 01/24/17 1045  •  atorvastatin (LIPITOR) tablet 20 mg, 20 mg, Oral, Daily, Edwin Rizo MD, 20 mg at 01/24/17 0852  •  baclofen (LIORESAL) tablet 10 mg, 10 mg, Oral, TID, Edwin Rizo MD, 10 mg at 01/24/17 0852  •  bisacodyl (DULCOLAX) suppository 10 mg, 10 mg, Rectal, Daily PRN, Edwin Rizo MD, 10 mg at 01/22/17 1543  •  budesonide (PULMICORT) nebulizer solution 0.5 mg, 0.5 mg, Nebulization, BID, Edwin Rizo MD, 0.5 mg at 01/24/17 1040  •  cefepime (MAXIPIME) 1 g/100 mL 0.9% NS IVPB (mbp), 1 g, Intravenous, Once, Melvin Hawkins MD, 1 g at 01/22/17 0306  •  dextrose (D50W) solution 25 g, 25 g, Intravenous, Q15 Min PRN, Edwin Rizo MD  •  dextrose (D50W) solution 25 g, 25 g, Intravenous, Q15 Min PRN, Shailesh Worthy MD  •  dextrose (GLUTOSE) oral gel 15 g, 15 g, Oral, Q15 Min PRN, Edwin Rizo MD  •  dextrose (GLUTOSE) oral gel 15 g, 15 g, Oral, Q15 Min PRN, Shailesh Worthy MD  •  docusate sodium (COLACE) capsule 100 mg, 100 mg, Oral, BID, Edwin Rizo MD, 100 mg at 01/24/17 0851  •  enoxaparin (LOVENOX) syringe 80 mg, 80 mg, Subcutaneous, Q12H, Edwin Rizo MD, 80 mg at 01/24/17 0851  •  famotidine (PEPCID) tablet 20 mg, 20 mg, Oral, BID, Edwin Rizo MD, 20 mg at 01/24/17 0851  •  ferrous sulfate EC tablet 324 mg, 324 mg, Oral, BID With Meals, Edwin Rizo MD, 324 mg at  01/24/17 0851  •  fluticasone (FLONASE) 50 MCG/ACT nasal spray 2 spray, 2 spray, Nasal, Daily, Edwin Rizo MD, 2 spray at 01/24/17 0851  •  glucagon (GLUCAGEN) injection 1 mg, 1 mg, Subcutaneous, Q15 Min PRN, Edwin Rizo MD  •  glucagon (GLUCAGEN) injection 1 mg, 1 mg, Subcutaneous, Q15 Min PRN, Shailesh Worthy MD  •  guaiFENesin (MUCINEX) 12 hr tablet 600 mg, 600 mg, Oral, BID, Edwin Rizo MD, 600 mg at 01/24/17 0851  •  insulin aspart (novoLOG) injection 0-7 Units, 0-7 Units, Subcutaneous, 4x Daily AC & at Bedtime, Shailesh Worthy MD, 4 Units at 01/24/17 1244  •  insulin aspart (novoLOG) injection 3 Units, 3 Units, Subcutaneous, TID With Meals, Edwin Rizo MD, 3 Units at 01/24/17 1244  •  insulin detemir (LEVEMIR) injection 23 Units, 23 Units, Subcutaneous, Q12H, Edwin Rizo MD, 23 Units at 01/24/17 0913  •  ipratropium-albuterol (DUO-NEB) nebulizer solution 3 mL, 3 mL, Nebulization, Q4H PRN, Edwin Rizo MD, 3 mL at 01/24/17 1048  •  isosorbide dinitrate (ISORDIL) tablet 10 mg, 10 mg, Oral, TID, Edwin Rizo MD, 10 mg at 01/24/17 0851  •  lactobacillus (BACID) caplet 1 tablet, 1 tablet, Oral, BID, Edwin Rizo MD, 1 tablet at 01/24/17 0852  •  [DISCONTINUED] aztreonam (AZACTAM) 2 g in sodium chloride 0.9 % 100 mL IVPB-MBP, 2 g, Intravenous, Q8H, Last Rate: 0 mL/hr at 01/23/17 1149, 2 g at 01/23/17 1746 **AND** levoFLOXacin (LEVAQUIN) 500 mg/100 mL D5W (premix) (LEVAQUIN) 500 mg, 500 mg, Intravenous, Q24H, Edwin Rizo MD, Last Rate: 0 mL/hr at 01/22/17 0826, 500 mg at 01/24/17 0423  •  montelukast (SINGULAIR) tablet 10 mg, 10 mg, Oral, Nightly, Edwin Rizo MD, 10 mg at 01/23/17 5384  •  nitroglycerin (NITROSTAT) SL tablet 0.4 mg, 0.4 mg, Sublingual, Q5 Min PRN, Shailesh Worthy MD  •  ondansetron (ZOFRAN) injection 4 mg, 4 mg, Intravenous, Q6H PRN, Edwin Rizo MD  •  [DISCONTINUED]  vancomycin IVPB 1250 mg in 250 mL NS (premix), 1,250 mg, Intravenous, Once, 1,250 mg at 01/22/17 0305 **AND** Pharmacy to dose vancomycin, , Does not apply, Continuous PRN, Edwin Rizo MD  •  polyethylene glycol (MIRALAX) powder 17 g, 17 g, Oral, Daily, Angela Wiley MD, 17 g at 01/24/17 0912  •  potassium chloride (KLOR-CON) packet 20 mEq, 20 mEq, Oral, Daily, Edwin Rizo MD, 20 mEq at 01/24/17 0851  •  senna (SENOKOT) tablet 8.6 mg, 1 tablet, Oral, BID, Edwin Rizo MD, 8.6 mg at 01/24/17 0851  •  SITagliptin (JANUVIA) tablet 100 mg, 100 mg, Oral, Daily, Edwin Rizo MD, 100 mg at 01/24/17 0851  •  sodium chloride 0.9 % flush 1-10 mL, 1-10 mL, Intravenous, PRN, Edwin Rizo MD  •  Insert peripheral IV, , , Once **AND** sodium chloride 0.9 % flush 10 mL, 10 mL, Intravenous, PRN, Melvin Hawkins MD  •  vancomycin (VANCOCIN) 1,250 mg in sodium chloride 0.9 % 250 mL IVPB, 1,250 mg, Intravenous, Q12H, Edwin Rizo MD, 1,250 mg at 01/24/17 0239      Assessment/Plan     1. Sepsis secondary to RLL Pneumonia: Concern for recurrent aspiration. PCT is low and WBC is now WNL. LA was mildly elevated at admit but is now WNL. Pulmonary following. Patient was on broad spectrum Abx's. Started to de-escalate yesterday with d/c of aztreonam. Continue levoquin and vanco, MRSA positive, I spoke with Dr. Bolton with pulmonary and continue MRSA coverage, was treated with doxycycline outpatient without improvement so he recommends zyvox for out-patient to complete course. So far blood cultures NGTD and viral panel is negative. Patient afebrile. I contacted NH today and patient was started on nectar thick liquids 1/15 but then changed back to thins 1/18 after FEES study showed no aspiration. Signs of reflux noted on FEES study.  ST following here and will conduct MBSS today.  On pepcid BID per home regimen.      2. Hypernatremia and hypokalemia: Now  slightly hyponatremic with D5W, will d/c IVFs today. Secondary to poor po intake. Nutrition supplement initiated. Replaced potassium po, magnesium level WNL.     3. Acute urinary retention secondary to BPH: Patient does not have a chronic fuller at the NH, retention was noted in ER and ufller placed. Urine culture 1/4 negative. Patient with allergy to flomax. Will attempt voiding trial today, if patient fails and fuller has to be replaced then will ask urology to see at that time.     4. Dementia: No acute issues. Patient confused in conversation. On no chronic meds for this.     5. Diabetes Mellitis type 2: On Basal bolus insulin with doses reduced from what he was on outpatient and on po januvia. Patient with good fasting blood glucose levels. Will adjust as needed and continue SSI and Accu checks.     6. Fecal impaction and constipation: disimpacted in ER. Needs good bowel regimen. On senokot BID and colace, added miralax daily here. + BM yesterday.     7. COPD: No acute issues here. No wheezes on exam. On home pulmicort and started on Brovana by pulmonary today. Continue duonebs PRN.     8. Hypertension: BP variable, on no chronic meds for this outpatient. Monitor.     9. Dyslipidemia: on statin.     10. CAD: On home imdur and statin. No acute issues here.     11. H/O CHF: Unclear type, patient not on ACEI/ARB, diuretic or BB so I suspect this is diastolic. No acute issues here.      12. Vitamin D deficiency: On supplementation.     13. GERD: On home pepcid, no acute issues.  ? If could be cause of aspiration as opposed to justice dysphagia, see #1 above.     14. Chronic Iron deficiency Anemia: On home iron supplementation.     15. 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 Lovenox continued here.    Plan for disposition: [facility]    Angela Wiley MD  01/24/17  12:52 PM

## 2017-01-24 NOTE — PLAN OF CARE
Problem: Pneumonia (Adult)  Intervention: Maximize Oxygenation/Ventilation/Perfusion    01/23/17 2029   Promote Aggressive Pulmonary Hygiene/Secretion Management   Cough And Deep Breathing done independently per patient   Respiratory Interventions   Airway/Ventilation Management pulmonary hygiene promoted

## 2017-01-24 NOTE — PLAN OF CARE
Problem: Patient Care Overview (Adult)  Goal: Plan of Care Review    01/24/17 1059   Outcome Evaluation   Outcome Summary/Follow up Plan OT- Patient required max assist of 2 for bed mobility, dependent for scooting to EOB. Pt with severe cognitive deficits and required significant cue to follow single commands. Pt initially required max assist for static sitting balance, however after several minutes required CGA and sat approximately 1 minute. Attempted sit to stand, however pt unable to clear buttocks from bed and held left leg in flexion and would not WB due to pain.

## 2017-01-24 NOTE — PLAN OF CARE
Problem: Patient Care Overview (Adult)  Goal: Plan of Care Review  Outcome: Ongoing (interventions implemented as appropriate)    01/24/17 9126   Coping/Psychosocial Response Interventions   Plan Of Care Reviewed With patient   Outcome Evaluation   Outcome Summary/Follow up Plan SLP/MBS: Mild oropharyngeal dysphagia with significant risk of aspiration of thins. No direct aspiration viewed during study. Rec continued use of [facility] soft diet w/nectar thick liquids. Upright positioning, reflux precautions, oral care, sips of water 30 mintues after meals post oral care with supervision for hydration, meds whole in puree or with thickened liquids. See report for details.No further tx recommended due to advanced dementia.    Patient Care Overview   Progress no change

## 2017-01-24 NOTE — THERAPY DISCHARGE NOTE
"Acute Care - Speech Language Pathology   MBS/Swallow Eval/Discharge MARIS Barber     Patient Name: Chance Bocanegra  : 1935  MRN: [id number]  Today's Date: 2017  Onset of Illness/Injury or Date of Surgery Date: 17     Referring Physician: Inez      Admit Date: 2017    Visit Dx:    ICD-10-CM ICD-9-CM   1. Pneumonia of right lower lobe due to infectious organism J18.9 483.8   2. Sepsis, due to unspecified organism A41.9 038.9     995.91   3. Urinary retention R33.9 788.20   4. Prostatic hypertrophy N40.0 600.90   5. Hyperglycemia R73.9 790.29   6. Fecal impaction K56.41 560.32   7. 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       SWALLOW EVALUATION (last 72 hours)      Swallow Evaluation       17 1516 17 1312             Rehab Evaluation    Document Type evaluation  -AD evaluation  -AD       Subjective Information no complaints;agree to therapy   Pt states \"you're the boss\".  -AD no complaints;agree to therapy  -AD       Patient Effort, Rehab Treatment adequate  -AD        Patient Effort, Rehab Treatment Comment  Alert and cooperative, but confused. Limited verbal and limited ability to follow " commands  -AD       Symptoms Noted During/After Treatment none  -AD none  -AD       General Information    Patient Profile Review  yes  -AD       Onset of Illness/Injury  01/21/17  -AD       Referring Physician Inez  -SOL Alvarado  -AD       Subjective Patient Observations Pt seen up in video chair for MBS. He was alert and cooperative.   -AD Pt was seen at bedside, upright during lunch. Family present and providing history for part of the evaluation.   -AD       Pertinent History Of Current Problem Evaluation via MBS was ordered due to concerns with recurrent aspiration pneumonia despite recent FEES on 1/17/17 at the nursing home indicating no aspiration or penetration of any consistencies trialed.   -AD Pt admitted with RLL pneumonia, elevated blood sugar, and fecal impaction. Found to be septic and suspected aspiration. Hx of aspiration pneumonia per [facility] records. He recently had a FEES study completed on 1/17/17 and his diet was changed to Mechanical Soft with nectar thick liquids. His diet was also changed here this am. Pt also with a history of dementia, GERD, T12  compression fracture, TIA and recent pneumonia.   -AD       Current Diet Limitations nectar thick liquids;mechanical soft  -AD nectar thick liquids;mechanical soft  -AD       Precautions/Limitations, Vision  --   vision problems reported but level unknown  -AD       Precautions/Limitations, Hearing  --   Unable to fully assess as pt w/advancing dementia  -AD       Prior Level of Function- Communication  limited to basic wants/needs  -AD       Prior Level of Function- Swallowing  dietary restrictions (e.g. consistent carb, low salt, etc);diet modifications- liquid;diet modifications- foods;concerns regarding dehydration;concerns regarding malnutrition;esophageal concerns;other (comment)   cardiac, consistent carbs  -AD       Plans/Goals Discussed With patient   unable to demonstrate understanding.  -AD patient and  family;agreed upon   pt does not demonstrate understanding at this time  -AD       Barriers to Rehab previous functional deficit;cognitive status  -AD previous functional deficit;cognitive status;visual deficit  -AD       Clinical Impression    Patient's Goals For Discharge patient could not state  -AD patient could not state  -AD       Family Goals For Discharge other (see comments)   not available  -AD patient able to return to all previous activities/roles  -AD       SLP Swallowing Diagnosis mild dysphagia;oral dysfunction;pharyngeal dysfunction  -AD mild dysphagia;moderate dysphagia;oral dysfunction;pharyngeal dysfunction  -AD       Rehab Potential/Prognosis, Swallowing other (see comments)   poor due to advanced dementia  -AD        Criteria for Skilled Therapeutic Interventions Met no significant expected improvement in functional status  -AD no significant expected improvement in functional status  -AD       FCM, Swallowing 4-->Level 4  -AD 4-->Level 4  -AD       Therapy Frequency evaluation only  -AD evaluation only  -AD       SLP Diet Recommendation IV - mechanical soft, no mixed consistencies;nectar/syrup-thick liquids  -AD soft textures;ground;IV - mechanical soft, no mixed consistencies;nectar/syrup-thick liquids  -AD       Recommended Diagnostics  other (see comments)   need to obtain copy of FEES from [facility]  -AD       Recommended Feeding/Eating Techniques maintain upright posture during/after eating for 30 mins;small sips/bites  -AD maintain upright posture during/after eating for 30 mins;small sips/bites;multiple swallow technique  -AD       SLP Rec. for Method of Medication Administration meds whole in pudding/applesauce;meds whole with thickened liquid  -AD meds whole in pudding/applesauce  -AD       Monitor For Signs Of Aspiration other (see comments)   no aspiration viewed to determine   -AD right lower lobe infiltrates   unknown if silent aspiration or not  -AD       Anticipated  Discharge Disposition skilled nursing facility  -AD skilled nursing facility   [facility]  -AD       Pain Assessment    Pain Assessment No/denies pain  -AD No/denies pain   Per nurse complained prior to eval. Pain meds given.  -AD       Cognitive Assessment/Intervention    Current Cognitive/Communication Assessment impaired  -AD impaired  -AD       Orientation Status oriented to;person;disoriented to;place;time;situation  -AD oriented to;disoriented to;place;time;situation   name  -AD       Follows Commands/Answers Questions able to follow single-step instructions;25% of the time;needs repetition;needs increased time  -AD able to follow single-step instructions;25% of the time  -AD       Oral Motor Structure and Function    Oral Motor Anatomy and Physiology  --   difficult to assess  -AD       Dentition Assessment  upper dentures/partial in place;lower dentures/partial in place;poor oral hygiene;other (see comments)   Oral care provided. Dentures w/caked material/food  -AD       Secretion Management  WNL/WFL  -AD       Mucosal Quality  moist, healthy  -AD       Velar Elevation  --   unable to assess as pt unable to follow commands  -AD       Gag Response  hyperactive;other (see comments)   gags w/oral care to palate  -AD       Volitional Swallow  unable to initiate volitional swallow  -AD       Volitional Cough  absent volitional cough  -AD       Oral Musculature General Assessment  lingual impairment  -AD       Lingual Strength and Mobility  reduced ROM;other (see comments)   deviates to the right w/protrusion. Unable to assess further  -AD       General Feeding/Swallowing Observations    Current Feeding Method  oral feeding methods  -AD       Respiratory Support Currently in Use  nasal cannula in use  -AD       Observations of Self Feeding Skills  appropriate self feeding skills observed  -AD       Observations of Posture During Feeding  Upright in bed at 45 degree angle. Raised to 80 degrees  -AD        SpO2 Level Following Swallow  94  -AD       Clinical Swallow Exam    Mode of Presentation  self fed;spoon;straw  -AD       Oral Preparation Concerns  cohesive solid:;excess chewing noted;increased prep time  -AD       Oral Transit Concerns  cohesive solid:;increased oral transit time  -AD       Oral Residue  cohesive solid:;residue throughout oral cavity  -AD       Oral Phase Results  impaired oral phase, signs of dysfunction present  -AD       Pharyngeal Phase Results  multiple swallows;throat clear;susupected impaired pharyngeal phase of swallowing   Cough not directly w/swallow ? pneum or pen/asp?  -AD       Summary of Clinical Exam  Pt presents with a mild to moderate oropharyngeal dysphagia. Hx of aspiration pneumonia. Recent eval with diet change to [facility] soft with nectar. He demonstrates continued signs of dysphagia with multiple swallows, cough and oral residue throughout the oral cavity. Pt does clear some on his own. Needs assistance with oral care and denture care. Caked food on upper and lower plate. Rec to continue with current diet and obtain records from [facility] re: recent FEES.   -AD       Videofluoroscopic Swallowing Exam    Risks/Benefits Reviewed risks/benefits explained;agreed to eval;patient   unsure of understanding  -AD        Positioning Needs for Swallow Exam safety belt to hold upper body upright (leans to left)  -AD        Radiologic Views Used for Examination left lateral view  -AD        Motor Function During Phonation/Speech    Observation Anatomic Considerations no anatomic structural deviation via videofluroscopy  -AD        VFSS    Mode of Presentation thin:;nectar:;honey:;pudding:;cohesive solid:;fed by clinician;spoon;self fed;straw  -AD        Volume(s) Presented (mL) thin:;nectar:;honey:;pudding:;cohesive solid:;other (see comments);patient controlled volumes   spoon size trials,bite of cracker; pt controlled thins straw  -AD        Oral Phase Results thin:;nectar:;incomplete  bolus preparation  -AD        Oral Transit Impairment thin:;nectar:;premature spillage into pharynx due to reduced back of tongue control;reduced lingual control  -AD        Pharyngeal Phase Physiologic Impairment thin:;nectar:;bolus to valleculae before initiation of response;reduced closure vestibule;other (see comments);bolus to pyriforms before initiation of response   no penetration or aspiration viewed. High risk w/thins.  -AD        Rosenbek's PenAsp Scale thin:;nectar:;honey:;pudding:;cohesive solid:;1-->Level 1  -AD        Att Compensatory Maneuvers bolus size;other (see comments)   no significant change in spill w/liquids w/smaller bolus  -AD        Pharyngeal Phase Results impaired pharyngeal phase of swallowing  -AD        Summary of VFSS Patient presents with a mild oropharyngeal dysphagia with decreased oral control of thins resulting in premature spill into the pharynx to the level of the valleculae on nectar and pyriform sinuses on thins before triggering a swallow. Pt did not penetrate or aspirate, but was felt to be at significant risk due to the severe spill and delay of swallow.    -AD        VFSS Swallow Recommendations    Eating Assistance no assistance needed with self eating;other (see comments)   setup  -AD        Oral Care oral care with toothbrush and dentifrice before breakfast and after meals and PRN  -AD        Modified Eating Strategies adjust rate of eating to fit with patient's ability;upright positioning 90 degrees;reflux precautions  -AD        Other Recommendations meds whole  -AD        Recommended Diet IV - mechanical soft, no mixed consistencies;nectar/syrup-thick liquids  -AD        Swallow Recommendations    Eating Assistance  requires caregiver to assist with positioning during eating;other (see comments)   assist with setup  -AD       Oral Care  oral care with toothbrush and dentifrice before breakfast and after meals and PRN  -AD       Modified Eating Strategies  upright  positioning 90 degrees;reflux precautions  -AD       Other Recommendations  meds whole;other (comment)   in puree  -AD       Recommended Diet  soft textures;ground;nectar/syrup-thick liquids;IV - mechanical soft, no mixed consistencies  -AD       Positioning and Restraints    Pre-Treatment Position sitting in chair/recliner  -AD in bed  -AD       Post Treatment Position chair  -AD bed  -AD       In Bed --   returned to room via transporter.  -AD call light within reach;encouraged to call for assist;side rails up x2  -AD         User Key  (r) = Recorded By, (t) = Taken By, (c) = Cosigned By    Initials Name Effective Dates    SOL Jaffe, MS [facility]-SLP 06/22/16 -         EDUCATION  The patient has been educated in the following areas:   Dysphagia (Swallowing Impairment) Oral Care/Hydration Modified Diet Instruction. Pt is unable to demonstrate understanding of teaching and results at this time.     SLP Recommendation and Plan  SLP Swallowing Diagnosis: mild dysphagia, oral dysfunction, pharyngeal dysfunction  SLP Diet Recommendation: IV - mechanical soft, no mixed consistencies, nectar/syrup-thick liquids  Recommended Feeding/Eating Techniques: maintain upright posture during/after eating for 30 mins, small sips/bites  SLP Rec. for Method of Medication Administration: meds whole in pudding/applesauce, meds whole with thickened liquid  Monitor For Signs Of Aspiration: other (see comments) (no aspiration viewed to determine )  Recommended Diagnostics: other (see comments) (need to obtain copy of FEES from [facility])  Criteria for Skilled Therapeutic Interventions Met: no significant expected improvement in functional status  Anticipated Discharge Disposition: skilled nursing facility  Rehab Potential/Prognosis, Swallowing: other (see comments) (poor due to advanced dementia)  Therapy Frequency: evaluation only             Plan of Care Review  Plan Of Care Reviewed With: patient  Progress: no  change  Outcome Summary/Follow up Plan: SLP/MBS: Mild oropharyngeal dysphagia with significant risk of aspiration of thins. No direct aspiration viewed  during study. Rec continued use of [facility] soft diet w/nectar thick liquids. Upright positioning, reflux precautions, oral care, sips of water 30 mintues after meals post oral care with supervision for hydration, meds whole in puree or with thickened liquids. See report for details.No further tx recommended due to advanced dementia.     Time Calculation:         Time Calculation- SLP       01/24/17 1827          Time Calculation- SLP    SLP Start Time 1500  -AD      SLP Stop Time 1516  -AD      SLP Time Calculation (min) 16 min  -AD      Total Timed Code Minutes- SLP 0 minute(s)  -AD      SLP Non-Billable Time (min) 0 min  -AD      SLP Received On 01/24/17  -AD        User Key  (r) = Recorded By, (t) = Taken By, (c) = Cosigned By    Initials Name Provider Type    AD Jamia Jaffe MS CCC-SLP Speech Therapist          Therapy Charges for Today     Code Description Service Date Service Provider Modifiers Qty    63403994288 HC ST MOTION FLUORO EVAL SWALLOW 1 1/24/2017 Jamia Jaffe MS CCC-SLP GN 1        SLP Discharge Summary  Anticipated Discharge Disposition: skilled nursing facility  Reason for Discharge: At baseline function  Outcomes Achieved: Other (evaluation only)    Jamia Jaffe MS CCC-SLP  1/24/2017

## 2017-01-24 NOTE — PLAN OF CARE
Problem: Patient Care Overview (Adult)  Goal: Plan of Care Review  Outcome: Ongoing (interventions implemented as appropriate)    01/24/17 0107   Coping/Psychosocial Response Interventions   Plan Of Care Reviewed With patient   Patient Care Overview   Progress no change         Problem: Fall Risk (Adult)  Goal: Absence of Falls  Outcome: Ongoing (interventions implemented as appropriate)    Problem: Pneumonia (Adult)  Goal: Signs and Symptoms of Listed Potential Problems Will be Absent or Manageable (Pneumonia)  Outcome: Ongoing (interventions implemented as appropriate)

## 2017-01-24 NOTE — PROGRESS NOTES
[facility]  Phone: [phone]  Alec Bolton MD    Subjective:  LOS: 2    Doing better. No complaints. Ate well this AM.    Objective   Vital Signs past 24hrs  BP range: BP: (123-167)/(71-93) 123/80  Pulse range: Heart Rate:  [72-92] 88  Resp rate range: Resp:  [19-23] 20  Temp range: Temp (24hrs), Av.8 °F (36.6 °C), Min:97.5 °F (36.4 °C), Max:98.2 °F (36.8 °C)    O2 Device: nasal cannulaFlow (L/min):  [2] 2  Oxygen range:SpO2:  [92 %-100 %] 95 %   159 lb 9.6 oz (72.4 kg); Body mass index is 22.26 kg/(m^2).    Intake/Output Summary (Last 24 hours) at 17 0918  Last data filed at 17 0616   Gross per 24 hour   Intake   1510 ml   Output   2050 ml   Net   -540 ml       Physical Exam   Constitutional: He appears well-developed.   HENT:   Head: Normocephalic.   Eyes: Pupils are equal, round, and reactive to light.   Neck: Normal range of motion. No JVD present.   Cardiovascular: Normal rate and regular rhythm.    No murmur heard.  Pulmonary/Chest: No respiratory distress. He has decreased breath sounds. He has rales (R>L) in the right lower field and the left lower field.   Abdominal: Soft. Bowel sounds are normal. He exhibits no mass. There is no tenderness.   Musculoskeletal: He exhibits no edema.   Neurological: He is alert.   Skin: Skin is warm and dry.     Results Review:    I have reviewed the laboratory and imaging data since the last note by formerly [facility] physician.  My annotations are noted in assessment and plan.    Medication Review:  I have reviewed the current MAR.  My annotations are noted in assessment and plan.      dextrose 50 mL/hr Last Rate: 50 mL/hr (17 1809)   Pharmacy to dose vancomycin       Plan   PCCM Problems  Pneumonia RLL  COPD  Dysphagia  Sepsis  Relevant Medical Diagnoses  Dementia  Hypokalemia  Fecal impaction  T12 compression #    Plan of Treatment  Appears stable if not improved. Agree with plan for abx as outlined by primary team. MRSA screen +ve, may benefit  from continuation of MRSA coverage.    No exac of COPD.    K replaced.    Alec Bolton MD  01/24/17  9:18 AM    EMR Dragon/Transcription disclaimer:   Some of this note may be an electronic transcription/translation of spoken language to printed text. The electronic translation of spoken language may permit erroneous, or at times, nonsensical words or phrases to be inadvertently transcribed; Although I have reviewed the note for such errors, some may still exist.

## 2017-01-25 LAB
GLUCOSE BLDC GLUCOMTR-MCNC: 159 MG/DL (ref 70–130)
GLUCOSE BLDC GLUCOMTR-MCNC: 166 MG/DL (ref 70–130)
GLUCOSE BLDC GLUCOMTR-MCNC: 235 MG/DL (ref 70–130)
GLUCOSE BLDC GLUCOMTR-MCNC: 60 MG/DL (ref 70–130)
GLUCOSE BLDC GLUCOMTR-MCNC: 83 MG/DL (ref 70–130)
VANCOMYCIN SERPL-MCNC: 17.4 MCG/ML

## 2017-01-25 PROCEDURE — 82962 GLUCOSE BLOOD TEST: CPT

## 2017-01-25 PROCEDURE — 94640 AIRWAY INHALATION TREATMENT: CPT

## 2017-01-25 PROCEDURE — 25010000002 VANCOMYCIN PER 500 MG: Performed by: INTERNAL MEDICINE

## 2017-01-25 PROCEDURE — 97110 THERAPEUTIC EXERCISES: CPT

## 2017-01-25 PROCEDURE — 80202 ASSAY OF VANCOMYCIN: CPT | Performed by: INTERNAL MEDICINE

## 2017-01-25 PROCEDURE — 25010000002 VANCOMYCIN 750 MG RECONSTITUTED SOLUTION 1 EACH VIAL: Performed by: INTERNAL MEDICINE

## 2017-01-25 PROCEDURE — 94799 UNLISTED PULMONARY SVC/PX: CPT

## 2017-01-25 PROCEDURE — 63710000001 INSULIN ASPART PER 5 UNITS: Performed by: INTERNAL MEDICINE

## 2017-01-25 PROCEDURE — 63710000001 INSULIN DETEMIR PER 5 UNITS: Performed by: INTERNAL MEDICINE

## 2017-01-25 PROCEDURE — 97530 THERAPEUTIC ACTIVITIES: CPT

## 2017-01-25 PROCEDURE — 99232 SBSQ HOSP IP/OBS MODERATE 35: CPT | Performed by: HOSPITALIST

## 2017-01-25 PROCEDURE — 63710000001 INSULIN ASPART PER 5 UNITS: Performed by: HOSPITALIST

## 2017-01-25 PROCEDURE — 25010000002 ENOXAPARIN PER 10 MG: Performed by: INTERNAL MEDICINE

## 2017-01-25 PROCEDURE — 25010000002 LEVOFLOXACIN PER 250 MG: Performed by: INTERNAL MEDICINE

## 2017-01-25 RX ORDER — LEVOFLOXACIN 500 MG/1
500 TABLET, FILM COATED ORAL EVERY 24 HOURS
Status: DISCONTINUED | OUTPATIENT
Start: 2017-01-26 | End: 2017-01-26 | Stop reason: HOSPADM

## 2017-01-25 RX ORDER — LINEZOLID 600 MG/1
600 TABLET, FILM COATED ORAL EVERY 12 HOURS SCHEDULED
Status: DISCONTINUED | OUTPATIENT
Start: 2017-01-25 | End: 2017-01-25

## 2017-01-25 RX ORDER — LINEZOLID 600 MG/1
600 TABLET, FILM COATED ORAL EVERY 12 HOURS SCHEDULED
Status: DISCONTINUED | OUTPATIENT
Start: 2017-01-25 | End: 2017-01-26 | Stop reason: HOSPADM

## 2017-01-25 RX ORDER — LEVOFLOXACIN 500 MG/1
500 TABLET, FILM COATED ORAL EVERY 24 HOURS
Status: DISCONTINUED | OUTPATIENT
Start: 2017-01-26 | End: 2017-01-25

## 2017-01-25 RX ADMIN — FERROUS SULFATE TAB EC 324 MG (65 MG FE EQUIVALENT) 324 MG: 324 (65 FE) TABLET DELAYED RESPONSE at 17:29

## 2017-01-25 RX ADMIN — GUAIFENESIN 600 MG: 600 TABLET, EXTENDED RELEASE ORAL at 17:29

## 2017-01-25 RX ADMIN — ISOSORBIDE DINITRATE 10 MG: 10 TABLET ORAL at 08:05

## 2017-01-25 RX ADMIN — Medication 1 TABLET: at 17:29

## 2017-01-25 RX ADMIN — INSULIN DETEMIR 23 UNITS: 100 INJECTION, SOLUTION SUBCUTANEOUS at 09:26

## 2017-01-25 RX ADMIN — ARFORMOTEROL TARTRATE 15 MCG: 15 SOLUTION RESPIRATORY (INHALATION) at 20:13

## 2017-01-25 RX ADMIN — SENNOSIDES 8.6 MG: 8.6 TABLET, FILM COATED ORAL at 17:29

## 2017-01-25 RX ADMIN — ARFORMOTEROL TARTRATE 15 MCG: 15 SOLUTION RESPIRATORY (INHALATION) at 09:08

## 2017-01-25 RX ADMIN — FAMOTIDINE 20 MG: 20 TABLET, FILM COATED ORAL at 17:30

## 2017-01-25 RX ADMIN — LEVOFLOXACIN 500 MG: 500 INJECTION, SOLUTION INTRAVENOUS at 03:46

## 2017-01-25 RX ADMIN — INSULIN DETEMIR 23 UNITS: 100 INJECTION, SOLUTION SUBCUTANEOUS at 20:50

## 2017-01-25 RX ADMIN — BACLOFEN 10 MG: 10 TABLET ORAL at 17:31

## 2017-01-25 RX ADMIN — GUAIFENESIN 600 MG: 600 TABLET, EXTENDED RELEASE ORAL at 08:05

## 2017-01-25 RX ADMIN — ISOSORBIDE DINITRATE 10 MG: 10 TABLET ORAL at 20:40

## 2017-01-25 RX ADMIN — Medication 1 TABLET: at 08:05

## 2017-01-25 RX ADMIN — SITAGLIPTIN 100 MG: 100 TABLET, FILM COATED ORAL at 08:05

## 2017-01-25 RX ADMIN — INSULIN ASPART 3 UNITS: 100 INJECTION, SOLUTION INTRAVENOUS; SUBCUTANEOUS at 09:25

## 2017-01-25 RX ADMIN — ENOXAPARIN SODIUM 80 MG: 80 INJECTION SUBCUTANEOUS at 08:04

## 2017-01-25 RX ADMIN — DOCUSATE SODIUM 100 MG: 100 CAPSULE, LIQUID FILLED ORAL at 08:05

## 2017-01-25 RX ADMIN — DOCUSATE SODIUM 100 MG: 100 CAPSULE, LIQUID FILLED ORAL at 17:30

## 2017-01-25 RX ADMIN — INSULIN ASPART 3 UNITS: 100 INJECTION, SOLUTION INTRAVENOUS; SUBCUTANEOUS at 12:31

## 2017-01-25 RX ADMIN — ATORVASTATIN CALCIUM 20 MG: 20 TABLET, FILM COATED ORAL at 08:05

## 2017-01-25 RX ADMIN — POLYETHYLENE GLYCOL (3350) 17 G: 17 POWDER, FOR SOLUTION ORAL at 09:25

## 2017-01-25 RX ADMIN — LINEZOLID 600 MG: 600 TABLET, FILM COATED ORAL at 20:40

## 2017-01-25 RX ADMIN — SENNOSIDES 8.6 MG: 8.6 TABLET, FILM COATED ORAL at 08:05

## 2017-01-25 RX ADMIN — BACLOFEN 10 MG: 10 TABLET ORAL at 20:40

## 2017-01-25 RX ADMIN — BUDESONIDE 0.5 MG: 0.5 SUSPENSION RESPIRATORY (INHALATION) at 20:10

## 2017-01-25 RX ADMIN — VANCOMYCIN HYDROCHLORIDE 1250 MG: 500 INJECTION, POWDER, LYOPHILIZED, FOR SOLUTION INTRAVENOUS at 02:29

## 2017-01-25 RX ADMIN — BACLOFEN 10 MG: 10 TABLET ORAL at 08:05

## 2017-01-25 RX ADMIN — FAMOTIDINE 20 MG: 20 TABLET, FILM COATED ORAL at 08:05

## 2017-01-25 RX ADMIN — MONTELUKAST SODIUM 10 MG: 10 TABLET, FILM COATED ORAL at 20:40

## 2017-01-25 RX ADMIN — FERROUS SULFATE TAB EC 324 MG (65 MG FE EQUIVALENT) 324 MG: 324 (65 FE) TABLET DELAYED RESPONSE at 08:05

## 2017-01-25 RX ADMIN — BUDESONIDE 0.5 MG: 0.5 SUSPENSION RESPIRATORY (INHALATION) at 09:03

## 2017-01-25 RX ADMIN — ENOXAPARIN SODIUM 80 MG: 80 INJECTION SUBCUTANEOUS at 20:40

## 2017-01-25 RX ADMIN — INSULIN ASPART 2 UNITS: 100 INJECTION, SOLUTION INTRAVENOUS; SUBCUTANEOUS at 17:30

## 2017-01-25 RX ADMIN — ISOSORBIDE DINITRATE 10 MG: 10 TABLET ORAL at 17:29

## 2017-01-25 RX ADMIN — INSULIN ASPART 5 UNITS: 100 INJECTION, SOLUTION INTRAVENOUS; SUBCUTANEOUS at 17:30

## 2017-01-25 RX ADMIN — POTASSIUM CHLORIDE 20 MEQ: 1.5 POWDER, FOR SOLUTION ORAL at 08:05

## 2017-01-25 RX ADMIN — FLUTICASONE PROPIONATE 2 SPRAY: 50 SPRAY, METERED NASAL at 08:06

## 2017-01-25 NOTE — THERAPY DISCHARGE NOTE
Acute Care - Occupational Therapy Progress Note/Discharge   [place]     Patient Name: Chance Bocanegra  : 1935  MRN: [id number]  Today's Date: 2017  Onset of Illness/Injury or Date of Surgery Date: 17     Referring Physician: Dr. Rizo      Admit Date: 2017    Visit Dx:     ICD-10-CM ICD-9-CM   1. Pneumonia of right lower lobe due to infectious organism J18.9 483.8   2. Sepsis, due to unspecified organism A41.9 038.9     995.91   3. Urinary retention R33.9 788.20   4. Prostatic hypertrophy N40.0 600.90   5. Hyperglycemia R73.9 790.29   6. Fecal impaction K56.41 560.32   7. Oropharyngeal dysphagia R13.12 787.22     Patient Active Problem List   Diagnosis   • Pneumonia of right lower lobe due to infectious organism             Adult Rehabilitation Note       17 0849 17 0848 17 0904    Rehab Assessment/Intervention    Discipline occupational therapist  -EN      Document Type therapy note (daily note);discharge summary  -EN      Subjective Information --   required encouragement to participate  -EN      Patient Effort, Rehab Treatment fair  -EN      Symptoms Noted During/After Treatment none  -EN      Precautions/Limitations fall precautions   droplet isolation  -EN      Recorded by [EN] Mireya Carbone OTR      Pain Assessment    Pain Assessment --   reported pain in both LEs, unable to place numeric value  -EN      Pain Score       Pain Location       Pain Orientation       Pain Intervention(s)       Recorded by [EN] Mireya Carbone OTR      Cognitive Assessment/Intervention    Current Cognitive/Communication Assessment impaired  -EN      Orientation Status oriented to;person  -EN      Follows Commands/Answers Questions       Personal Safety       Personal Safety Interventions       Recorded by [EN] Mireya Carbone OTR      Bed Mobility, Assessment/Treatment    Bed Mobility, Assistive Device       Bed Mobility, Roll Left, [place]       Bed Mobility, Roll  Right, [place]       Bed Mobility, Scoot/Bridge, [place] dependent (less than 25% patient effort)  -EN      Bed Mob, Supine to Sit, [place] maximum assist (25% patient effort);2 person assist required  -EN      Bed Mob, Sit to Supine, [place] dependent (less than 25% patient effort)  -EN      Bed Mobility, Comment Encouragement and repeated cues to follow directions with supine to sit  -EN      Recorded by [EN] Mireya Carbone, OTR      Transfer Assessment/Treatment    Transfer, Comment max assist to dependent to sit at EOB, pt leaned to left and posterioly. Continuously asked to lay down.  -EN      Recorded by [EN] Mireya Carbone, OTR      Toileting Assessment/Training    Toileting Assess/Train, Indepen Level       Toileting Assess/Train, Comment       Recorded by       Balance Skills Training    Sitting-Level of Assistance Dependent;Maximum assistance  -EN      Sitting # of Minutes       Recorded by [EN] Mireya Carbone, OTR      Positioning and Restraints    Pre-Treatment Position in bed  -EN      Post Treatment Position bed  -EN      In Bed notified nsg;side lying left;call light within reach;encouraged to call for assist;exit alarm on  -EN      Recorded by [EN] Mireya Carbone, OTR        01/24/17 0903          User Key  (r) = Recorded By, (t) = Taken By, (c) = Cosigned By    Initials Name Effective Dates     Christina Acevedo, PT 08/11/15 -     JOSE Davila, PTA 08/11/15 -     EN Mireya Carbone, OTR 06/22/16 -     CARLY Harrison, PT 12/01/15 -                 OT Goals       01/25/17 1135 01/23/17 1316       Transfer Training OT STG    Transfer Training OT STG, Date Established  01/16/17  -EN     Transfer Training OT STG, Time to Achieve  3 days  -EN     Transfer Training OT STG, Activity Type  sit to stand/stand to sit  -EN     Transfer Training OT STG, [place] Level dependent (less than 25% patient effort)   recommend use of colleen  -EN moderate assist  (50% patient effort)  -EN     Transfer Training OT STG, Assist Device  walker, standard   for improved ind with transfers to w/c  -EN     Transfer Training OT STG, Date Goal Reviewed 01/25/17  -EN      Transfer Training OT STG, Outcome goal not met  -EN      Dynamic Sitting Balance OT LTG    Dynamic Sitting Balance OT LTG, Date Established  01/23/17  -EN     Dynamic Sitting Balance OT LTG, Time to Achieve  3 days  -EN     Dynamic Sitting Balance OT LTG, [place] Level maximum assist (25% patient effort);dependent (less than 25% patient effort)  -EN minimum assist (75% patient effort)   3-5 minutes for improved ADL ind  -EN     Dynamic Sitting Balance OT LTG, Assist Device  UE Support  -EN     Dynamic Sitting Balance OT LTG, Date Goal Reviewed 01/25/17  -EN      Dynamic Sitting Balance OT LTG, Outcome goal not met  -EN        User Key  (r) = Recorded By, (t) = Taken By, (c) = Cosigned By    Initials Name Provider Type    EN ELENA Cesar Occupational Therapist          Occupational Therapy Education     Title: PT OT SLP Therapies (Resolved)     Topic: Occupational Therapy (Resolved)     Point: ADL training (Resolved)    Description: Instruct learner(s) on proper safety adaptation and remediation techniques during self care or transfers.   Instruct in proper use of assistive devices.    Learning Progress Summary    Learner Readiness Method Response Comment Documented by Status   Patient Acceptance E NR Pt educated on bed mobility and benefits of therapy. EN 01/25/17 0849 Active    Acceptance E NR,NL Pt educated on bed mobility and safety with standing. Pt with severe cognitive deficits and showed no evidence of learning. EN 01/24/17 0904 Active    Acceptance E NR pt educated on benefits of activity and bed mobility. EN 01/23/17 1315 Active                      User Key     Initials Effective Dates Name Provider Type Discipline    EN 06/22/16 -  ELENA Cesar Occupational Therapist OT                 OT Recommendation and Plan  Anticipated Discharge Disposition: extended care facility  Planned Therapy Interventions: transfer training, balance training  Therapy Frequency: 3-5 times/wk  Plan of Care Review  Plan Of Care Reviewed With: patient  Progress: improving  Outcome Summary/Follow up Plan: OT- Patient requires max assist of 2 to dependent for bed mobility and static sitting balance. Pt requires significant encouragement to participate. Pt has demonstrated a daily decline with therapy since evaluation. Due to poor rehab potential and low motivation, will discharge from OT at this time. Recommend staff use colleen lift for safe transfers.          Outcome Measures       01/25/17 0849 01/25/17 0848 01/24/17 0904    How much help from another person do you currently need...    Turning from your back to your side while in flat bed without using bedrails?  1  -JW     Moving from lying on back to sitting on the side of a flat bed without bedrails?  1  -JW     Moving to and from a bed to a chair (including a wheelchair)?  1  -JW     Standing up from a chair using your arms (e.g., wheelchair, bedside chair)?  1  -JW     Climbing 3-5 steps with a railing?  1  -JW     To walk in hospital room?  1  -JW     AM-PAC 6 Clicks Score  6  -JW     How much help from another is currently needed...    Putting on and taking off regular lower body clothing? 1  -EN  1  -EN    Bathing (including washing, rinsing, and drying) 1  -EN  1  -EN    Toileting (which includes using toilet bed pan or urinal) 1  -EN  1  -EN    Putting on and taking off regular upper body clothing 1  -EN  2  -EN    Taking care of personal grooming (such as brushing teeth) 3  -EN  3  -EN    Eating meals 3  -EN  3  -EN    Score 10  -EN  11  -EN    Functional Assessment    Outcome Measure Options  AM-PAC 6 Clicks Basic Mobility (PT)  -JW       01/24/17 0903 01/23/17 1300 01/23/17 1000    How much help from another person do you currently need...    Turning  from your back to your side while in flat bed without using bedrails? 1  -KM  2  -LH    Moving from lying on back to sitting on the side of a flat bed without bedrails? 1  -KM  1  -LH    Moving to and from a bed to a chair (including a wheelchair)? 1  -KM  1  -LH    Standing up from a chair using your arms (e.g., wheelchair, bedside chair)? 1  -KM  1  -LH    Climbing 3-5 steps with a railing? 1  -KM  1  -LH    To walk in hospital room? 1  -KM  1  -LH    AM-PAC 6 Clicks Score 6  -KM  7  -LH    Functional Assessment    Outcome Measure Options AM-PAC 6 Clicks Basic Mobility (PT)  -KM AM-PAC 6 Clicks Daily Activity (OT)  -EN AM-PAC 6 Clicks Basic Mobility (PT)  -LH      01/23/17 0918          How much help from another is currently needed...    Putting on and taking off regular lower body clothing? 2  -EN      Bathing (including washing, rinsing, and drying) 2  -EN      Toileting (which includes using toilet bed pan or urinal) 2  -EN      Putting on and taking off regular upper body clothing 2  -EN      Taking care of personal grooming (such as brushing teeth) 3  -EN      Eating meals 3  -EN      Score 14  -EN        User Key  (r) = Recorded By, (t) = Taken By, (c) = Cosigned By    Initials Name Provider Type     Christina Acevedo, PT Physical Therapist    JOSE Davila, PTA Physical Therapy Assistant    GONZALEZ Carbone OTR Occupational Therapist    CARLY Harrison, PT Physical Therapist           Time Calculation:          Time Calculation- OT       01/25/17 1138          Time Calculation- OT    OT Start Time 0848  -EN      OT Stop Time 0900  -EN      OT Time Calculation (min) 12 min  -EN        User Key  (r) = Recorded By, (t) = Taken By, (c) = Cosigned By    Initials Name Provider Type    GONZALEZ Carbone OTR Occupational Therapist          Therapy Charges for Today     Code Description Service Date Service Provider Modifiers Qty    [id number]  OT THERAPEUTIC ACT EA 15 MIN 1/24/2017 Mireya  ELENA Jackson GO 2    [id number]  OT THERAPEUTIC ACT EA 15 MIN 1/25/2017 ELENA Cesar GO 1               OT Discharge Summary  Anticipated Discharge Disposition: extended care facility  Discharge Destination: Extended care facility - LTC    ELENA Rodas  1/25/2017

## 2017-01-25 NOTE — PLAN OF CARE
Problem: Inpatient Physical Therapy  Goal: Bed Mobility Goal STG- PT  Outcome: Unable to achieve outcome(s) by discharge Date Met:  01/25/17 01/22/17 1128 01/25/17 1115   Bed Mobility PT STG   Bed Mobility PT STG, Date Established 01/22/17 --    Bed Mobility PT STG, Time to Achieve 3 days --    Bed Mobility PT STG, Activity Type all bed mobility --    Bed Mobility PT STG, [place] Level minimum assist (75% patient effort) --    Bed Mobility PT STG, Outcome --  goal not met   Bed Mobility PT STG, Reason Goal Not Met --  unable to make needed progress       Goal: Transfer Training Goal 1 STG- PT  Outcome: Unable to achieve outcome(s) by discharge Date Met:  01/25/17 01/22/17 1128 01/25/17 1115   Transfer Training PT STG   Transfer Training PT STG, Date Established 01/22/17 --    Transfer Training PT STG, Time to Achieve 5 days --    Transfer Training PT STG, Activity Type bed to chair /chair to bed --    Transfer Training PT STG, [place] Level minimum assist (75% patient effort) --    Transfer Training PT STG, Date Goal Reviewed --  01/25/17   Transfer Training PT STG, Outcome --  goal not met   Transfer Training PT STG, Reason Goal Not Met --  unable to make needed progress       Goal: Transfer Training Goal 1 LTG- PT  Outcome: Unable to achieve outcome(s) by discharge Date Met:  01/25/17 01/22/17 1128 01/25/17 1115   Transfer Training PT LTG   Transfer Training PT LTG, Date Established 01/22/17 --    Transfer Training PT LTG, Activity Type sit to stand/stand to sit --    Transfer Training PT LTG, [place] Level minimum assist (75% patient effort) --    Transfer Training PT LTG, Date Goal Reviewed --  01/25/17   Transfer Training PT LTG, Outcome --  goal not met   Transfer Training PT LTG, Reason Goal Not Met --  unable to make needed progress

## 2017-01-25 NOTE — PLAN OF CARE
Problem: Inpatient Occupational Therapy  Goal: Transfer Training Goal 1 STG- OT    01/25/17 1135   Transfer Training OT STG   Transfer Training OT STG, [place] Level dependent (less than 25% patient effort)  (recommend use of colleen)   Transfer Training OT STG, Date Goal Reviewed 01/25/17   Transfer Training OT STG, Outcome goal not met       Goal: Dynamic Sitting Balance Goal LTG- OT    01/25/17 1135   Dynamic Sitting Balance OT LTG   Dynamic Sitting Balance OT LTG, [place] Level maximum assist (25% patient effort);dependent (less than 25% patient effort)   Dynamic Sitting Balance OT LTG, Date Goal Reviewed 01/25/17   Dynamic Sitting Balance OT LTG, Outcome goal not met

## 2017-01-25 NOTE — PLAN OF CARE
Problem: Inpatient Occupational Therapy  Goal: Transfer Training Goal 1 STG- OT  Outcome: Unable to achieve outcome(s) by discharge Date Met:  01/25/17

## 2017-01-25 NOTE — PLAN OF CARE
Problem: Pneumonia (Adult)  Intervention: Maximize Oxygenation/Ventilation/Perfusion    01/25/17 5089   Promote Aggressive Pulmonary Hygiene/Secretion Management   Cough And Deep Breathing done with encouragement   Positioning   Head Of Bed (HOB) Position HOB at 30 degrees

## 2017-01-25 NOTE — THERAPY DISCHARGE NOTE
Acute Care - Physical Therapy Treatment Note/Discharge  MARIS Barber     Patient Name: Chance Bocanegra  : 1935  MRN: [id number]  Today's Date: 2017  Onset of Illness/Injury or Date of Surgery Date: 17  Date of Referral to PT: 17  Referring Physician: Dr. Rizo    Admit Date: 2017    Visit Dx:    ICD-10-CM ICD-9-CM   1. Pneumonia of right lower lobe due to infectious organism J18.9 483.8   2. Sepsis, due to unspecified organism A41.9 038.9     995.91   3. Urinary retention R33.9 788.20   4. Prostatic hypertrophy N40.0 600.90   5. Hyperglycemia R73.9 790.29   6. Fecal impaction K56.41 560.32   7. Oropharyngeal dysphagia R13.12 787.22     Patient Active Problem List   Diagnosis   • Pneumonia of right lower lobe due to infectious organism       Physical Therapy Education     Title: PT OT SLP Therapies (Active)     Topic: Physical Therapy (Resolved)     Point: Mobility training (Resolved)    Learning Progress Summary    Learner Readiness Method Response Comment Documented by Status   Patient Acceptance E NL   17 1112 Active    Acceptance E,D VU   17 1123 Done    Acceptance E VU   17 1041 Done    Acceptance E VU,NR transfers and importance of activity  17 1121 Done                      User Key     Initials Effective Dates Name Provider Type Discipline     08/11/15 -  Christina Acevedo, PT Physical Therapist PT     08/11/15 -  Almita Davila, PTA Physical Therapy Assistant PT     08/11/15 -  Elda Ramirez, PT Physical Therapist PT     12/01/15 -  Camila Harrison, PT Physical Therapist PT                    IP PT Goals       17 1115 17 1128       Bed Mobility PT STG    Bed Mobility PT STG, Date Established  17  -SP     Bed Mobility PT STG, Time to Achieve  3 days  -SP     Bed Mobility PT STG, Activity Type  all bed mobility  -SP     Bed Mobility PT STG, [place] Level  minimum assist (75% patient effort)  -SP     Bed Mobility  PT STG, Outcome goal not met  -JW      Bed Mobility PT STG, Reason Goal Not Met unable to make needed progress  -JW      Transfer Training PT STG    Transfer Training PT STG, Date Established  01/22/17  -SP     Transfer Training PT STG, Time to Achieve  5 days  -SP     Transfer Training PT STG, Activity Type  bed to chair /chair to bed  -SP     Transfer Training PT STG, [place] Level  minimum assist (75% patient effort)  -SP     Transfer Training PT STG, Date Goal Reviewed 01/25/17  -JW      Transfer Training PT STG, Outcome goal not met  -JW      Transfer Training PT STG, Reason Goal Not Met unable to make needed progress  -JW      Transfer Training PT LTG    Transfer Training PT LTG, Date Established  01/22/17  -SP     Transfer Training PT LTG, Activity Type  sit to stand/stand to sit  -SP     Transfer Training PT LTG, [place] Level  minimum assist (75% patient effort)  -SP     Transfer Training PT  LTG, Date Goal Reviewed 01/25/17  -JW      Transfer Training PT LTG, Outcome goal not met  -JW      Transfer Training PT LTG, Reason Goal Not Met unable to make needed progress  -        User Key  (r) = Recorded By, (t) = Taken By, (c) = Cosigned By    Initials Name Provider Type    SP Elda Ramirez, PT Physical Therapist    CARLY Harrison, PT Physical Therapist              Adult Rehabilitation Note       01/25/17 0848 01/24/17 0904 01/24/17 0903    Rehab Assessment/Intervention    Discipline physical therapist  -JW occupational therapist  -EN physical therapy assistant  -KM    Document Type discharge summary;therapy note (daily note)  -JW therapy note (daily note)  -EN therapy note (daily note)  -KM    Subjective Information no complaints   requires encouragement to participate  -JW agree to therapy;complains of;pain   left leg  -EN agree to therapy;complains of;pain   left leg- issues for  15 years he says  -KM    Patient Effort, Rehab Treatment fair  -JW adequate  -EN adequate  -KM     Symptoms Noted During/After Treatment none  -JW increased pain   in left leg with movement  -EN increased pain   in LLE  -KM    Precautions/Limitations fall precautions;other (see comments)   droplet isolation  -JW fall precautions  -EN fall precautions  -KM    Recorded by [JW] Camila Harrison, PT [EN] Mireya Carbone, OTR [KM] Almita Davila PTA    Pain Assessment    Pain Assessment --   pt reports pain in both LEs, unable to rate  -JW 0-10  -EN --   reports pain but unable to rate  -KM    Pain Score  --   unable to place numeric value  -EN     Pain Location  Leg  -EN Leg  -KM    Pain Orientation  Left  -EN Left  -KM    Pain Intervention(s)  Repositioned   nursing notified  -EN Medication (See MAR);Repositioned   nurse notified  -KM    Recorded by [JW] Camila Harrison PT [EN] Mireya Carbone, OTR [KM] Almita Davila PTA    Cognitive Assessment/Intervention    Current Cognitive/Communication Assessment impaired  -JW impaired  -EN impaired  -KM    Orientation Status  oriented to;person  -EN oriented to;person  -KM    Follows Commands/Answers Questions  25% of the time;able to follow single-step instructions;needs cueing;needs increased time;needs repetition  -EN     Personal Safety  severe impairment;decreased awareness, need for assist;decreased awareness, need for safety  -EN     Personal Safety Interventions  gait belt;nonskid shoes/slippers when out of bed  -EN gait belt;nonskid shoes/slippers when out of bed  -KM    Recorded by [JW] Camila Harrison, ANGELINA [EN] Mireya Carbone, OTR [KM] Almita Davila PTA    Bed Mobility, Assessment/Treatment    Bed Mobility, Assistive Device  bed rails;head of bed elevated;draw sheet  -EN bed rails;head of bed elevated;draw sheet  -KM    Bed Mobility, Roll Left, [place]  maximum assist (25% patient effort);2 person assist required  -EN maximum assist (25% patient effort);2 person assist required  -KM    Bed Mobility, Roll Right, [place]  maximum assist (25%  patient effort);2 person assist required  -EN maximum assist (25% patient effort);2 person assist required  -KM    Bed Mobility, Scoot/Bridge, [place] dependent (less than 25% patient effort);2 person assist required  -JW dependent (less than 25% patient effort)  -EN dependent (less than 25% patient effort);2 person assist required  -KM    Bed Mob, Supine to Sit, [place] maximum assist (25% patient effort);2 person assist required  -JW maximum assist (25% patient effort);2 person assist required  -EN maximum assist (25% patient effort);2 person assist required  -KM    Bed Mob, Sit to Supine, [place] dependent (less than 25% patient effort);2 person assist required  -JW maximum assist (25% patient effort);2 person assist required  -EN maximum assist (25% patient effort);2 person assist required  -KM    Bed Mobility, Comment pt demonstrates no active effort to assist with sit to supine.  pt requires repeated cues for following commands during functional activity  -JW Patient required max assist initally to sit at EOB, however after 1-2 minutes sat with CGA for approximately 1 minute.  -EN pt initially leaning backwards with sitting on the side of bed but was able to sit with CG of one for 1-2 minutes once positioned  -KM    Recorded by [JW] Camila Harrison, PT [EN] Mireya Carbone, OTR [KM] Almita Davila PTA    Transfer Assessment/Treatment    Transfer, Comment pt max to dependent to maintain static sitting balance, transfers not appropriate at this time.  -JW Attempted sit to stand, pt unalbe to clear buttocks from bed, held left leg in air and would not WB left leg due to pain.  -EN attempted sit-stand with pt- pt not putting weight on LLE and limited standing on RLE(could not clear the bed) pt then states he needed to use bathroom and returned pt to assist pca with cleanup  -KM    Recorded by [CARLY] Camila Harrison, PT [EN] Mireya Carbone, OTR [KM] Almita Davila PTA    Gait  Assessment/Treatment    Gait, Comment   not appropriate at this time-   -KM    Recorded by   [KM] Almita Davila PTA    Toileting Assessment/Training    Toileting Assess/Train, Indepen Level  dependent (less than 25% patient effort)  -EN dependent (less than 25% patient effort)  -KM    Toileting Assess/Train, Comment  total assist for toileting  -EN     Recorded by  [EN] Mireya Carbone, OTR [KM] Almita Davila PTA    Balance Skills Training    Sitting-Level of Assistance Maximum assistance;Dependent   significant posterior and left lateral lean in sitting  -JW      Sitting # of Minutes pt unable to maintain proper midline despite cues and tactile stimulation  -JW      Recorded by [JW] Camila Harrison PT      Positioning and Restraints    Pre-Treatment Position in bed  -JW in bed  -EN in bed  -KM    Post Treatment Position bed  -JW chair  -EN bed  -KM    In Bed side lying left;call light within reach;encouraged to call for assist;exit alarm on  -JW notified nsg;supine;call light within reach;with nsg;exit alarm on  -EN side rails up x2;notified nsg;call light within reach;with nsg  -KM    Recorded by [JW] Camila Harrison, ANGELINA [EN] Mireya Carbone, OTR [KM] Almita Davila PTA      01/23/17 0920          Rehab Assessment/Intervention    Discipline physical therapist  -      Document Type therapy note (daily note)  -      Subjective Information agree to therapy;complains of;pain  -      Patient Effort, Rehab Treatment fair  -      Symptoms Noted During/After Treatment fatigue  -      Recorded by [] Christina Acevedo PT      Pain Assessment    Pain Assessment --   c/o generalized pain in arms/legs, unable to rate   -      Recorded by [LH] Christina Acevedo PT      Bed Mobility, Assessment/Treatment    Bed Mobility, Assistive Device bed rails;head of bed elevated  -      Bed Mob, Supine to Sit, [place] maximum assist (25% patient effort);2 person assist required;verbal cues required  -       Bed Mob, Sit to Supine, [place] maximum assist (25% patient effort);dependent (less than 25% patient effort);2 person assist required;verbal cues required  -      Bed Mobility, Comment Patient sat edge of bed x 5 minutes.  Initial Loss of balance left, requiring min A, however improved with time, progressing to close SBA x 3 minutes.  -LH      Recorded by [] Christina Acevedo PT      Transfer Assessment/Treatment    Transfer, Comment unsafet to attempt transfers at this time due to poor sitting balance, decreased safety  -LH      Recorded by [] Christina Acevedo PT      Positioning and Restraints    Pre-Treatment Position in bed  -LH      Post Treatment Position bed  -LH      In Bed notified nsg;call light within reach;encouraged to call for assist;exit alarm on;side lying right   patient c/o pain in buttock.  Nurse notified  -LH      Recorded by [] Christina Acevedo PT        User Key  (r) = Recorded By, (t) = Taken By, (c) = Cosigned By    Initials Name Effective Dates     Christina Acevedo, PT 08/11/15 -     JOSE Davila, PTA 08/11/15 -     EN Mireya Carbone, OTR 06/22/16 -     CARLY Harrison, PT 12/01/15 -           PT Recommendation and Plan  Anticipated Discharge Disposition: extended care facility  Planned Therapy Interventions: balance training, bed mobility training, transfer training  PT Frequency: daily, 5 times/wk  Plan of Care Review  Plan Of Care Reviewed With: patient  Progress: improving  Outcome Summary/Follow up Plan: PT: Patient max assist x2 to dependent for bed mobility and dependent x2 for static sitting balance during session.  patient demonstrates decreased active participation with functional mobility tasks and overall mobility has declined since initial evaluation.  At this time, recommend staff utilize colleen lift for out of bed activities and will discontinue physical therapy services as patient is unable to make progress or be active participant in therapy.   Continue to recommend return to extended care facility at discharge.          Outcome Measures       01/25/17 0848 01/24/17 0904 01/24/17 0903    How much help from another person do you currently need...    Turning from your back to your side while in flat bed without using bedrails? 1  -JW  1  -KM    Moving from lying on back to sitting on the side of a flat bed without bedrails? 1  -JW  1  -KM    Moving to and from a bed to a chair (including a wheelchair)? 1  -JW  1  -KM    Standing up from a chair using your arms (e.g., wheelchair, bedside chair)? 1  -JW  1  -KM    Climbing 3-5 steps with a railing? 1  -JW  1  -KM    To walk in hospital room? 1  -JW  1  -KM    AM-PAC 6 Clicks Score 6  -JW  6  -KM    How much help from another is currently needed...    Putting on and taking off regular lower body clothing?  1  -EN     Bathing (including washing, rinsing, and drying)  1  -EN     Toileting (which includes using toilet bed pan or urinal)  1  -EN     Putting on and taking off regular upper body clothing  2  -EN     Taking care of personal grooming (such as brushing teeth)  3  -EN     Eating meals  3  -EN     Score  11  -EN     Functional Assessment    Outcome Measure Options AM-PAC 6 Clicks Basic Mobility (PT)  -  AM-PAC 6 Clicks Basic Mobility (PT)  -KM      01/23/17 1300 01/23/17 1000 01/23/17 0918    How much help from another person do you currently need...    Turning from your back to your side while in flat bed without using bedrails?  2  -LH     Moving from lying on back to sitting on the side of a flat bed without bedrails?  1  -LH     Moving to and from a bed to a chair (including a wheelchair)?  1  -LH     Standing up from a chair using your arms (e.g., wheelchair, bedside chair)?  1  -LH     Climbing 3-5 steps with a railing?  1  -LH     To walk in hospital room?  1  -LH     AM-PAC 6 Clicks Score  7  -LH     How much help from another is currently needed...    Putting on and taking off regular lower  body clothing?   2  -EN    Bathing (including washing, rinsing, and drying)   2  -EN    Toileting (which includes using toilet bed pan or urinal)   2  -EN    Putting on and taking off regular upper body clothing   2  -EN    Taking care of personal grooming (such as brushing teeth)   3  -EN    Eating meals   3  -EN    Score   14  -EN    Functional Assessment    Outcome Measure Options AM-PAC 6 Clicks Daily Activity (OT)  -EN AM-PAC 6 Clicks Basic Mobility (PT)  -       User Key  (r) = Recorded By, (t) = Taken By, (c) = Cosigned By    Initials Name Provider Type     Christina Acevedo, PT Physical Therapist    KM Almita Davila, PTA Physical Therapy Assistant    EN Mireya Carbone, OTR Occupational Therapist    CARLY Harrison PT Physical Therapist           Time Calculation:         PT Charges       01/25/17 1116          Time Calculation    Start Time 0848  -      Stop Time 0900  -      Time Calculation (min) 12 min  -      PT Received On 01/25/17  -        User Key  (r) = Recorded By, (t) = Taken By, (c) = Cosigned By    Initials Name Provider Type    CARLY Harrison, ANGELINA Physical Therapist          Therapy Charges for Today     Code Description Service Date Service Provider Modifiers Qty    70440087039  PT THER PROC EA 15 MIN 1/25/2017 Camila Harrison, ANGELINA GP 1            PT Discharge Summary  Anticipated Discharge Disposition: extended care facility  Reason for Discharge: Unable to participate  Outcomes Achieved: Unable to make functional progress toward goals at this time, Unable to tolerate or actively participate in therapy    Camila Harrison PT  1/25/2017

## 2017-01-25 NOTE — PLAN OF CARE
Problem: Patient Care Overview (Adult)  Goal: Plan of Care Review    01/25/17 1132   Outcome Evaluation   Outcome Summary/Follow up Plan OT- Patient requires max assist of 2 to dependent for bed mobility and static sitting balance. Pt requires significant encouragement to participate. Pt has demonstrated a daily decline with therapy since evaluation. Due to poor rehab potential and low motivation, will discharge from OT at this time. Recommend staff use colleen lift for safe transfers.

## 2017-01-25 NOTE — PLAN OF CARE
Problem: Inpatient Occupational Therapy  Goal: Dynamic Sitting Balance Goal LTG- OT  Outcome: Unable to achieve outcome(s) by discharge Date Met:  01/25/17

## 2017-01-25 NOTE — PROGRESS NOTES
[facility]  Phone: [phone]  Alec Bolton MD    Subjective:  LOS: 3    No respiratory complaints.    Objective   Vital Signs past 24hrs  BP range: BP: ()/(49-76) 121/68  Pulse range: Heart Rate:  [76-95] 91  Resp rate range: Resp:  [18-20] 20  Temp range: Temp (24hrs), Av.9 °F (36.6 °C), Min:97.5 °F (36.4 °C), Max:98.2 °F (36.8 °C)    O2 Device: room air   Oxygen range:SpO2:  [95 %-99 %] 99 %   159 lb 9.6 oz (72.4 kg); Body mass index is 22.26 kg/(m^2).    Intake/Output Summary (Last 24 hours) at 17 1251  Last data filed at 17 1000   Gross per 24 hour   Intake   1570 ml   Output   1800 ml   Net   -230 ml       Physical Exam   Constitutional: He appears well-developed.   HENT:   Head: Normocephalic.   Eyes: Pupils are equal, round, and reactive to light.   Neck: Normal range of motion. No JVD present.   Cardiovascular: Normal rate and regular rhythm.    No murmur heard.  Pulmonary/Chest: No respiratory distress. He has decreased breath sounds. He has rales (few scattered).   Abdominal: Soft. Bowel sounds are normal. He exhibits no mass. There is no tenderness.   Musculoskeletal: He exhibits no edema.   Neurological: He is alert.   Skin: Skin is warm and dry.     Results Review:    I have reviewed the laboratory and imaging data since the last note by [facility] physician.  My annotations are noted in assessment and plan.    Medication Review:  I have reviewed the current MAR.  My annotations are noted in assessment and plan.      Pharmacy to dose vancomycin      Plan   PCCM Problems  Pneumonia RLL, MRSA  COPD  Dysphagia  Sepsis  Relevant Medical Diagnoses  Dementia  Hypokalemia  Fecal impaction  T12 compression #    Plan of Treatment  Finish out 10 days of Zyvox and Levaquin total. No objection to discharge.    COPD without exacerbation.    Await evaluation for dysphagia.    No objection to discharge.    Alec Bolton MD  17  12:51 PM    EMR Dragon/Transcription disclaimer:   Some  of this note may be an electronic transcription/translation of spoken language to printed text. The electronic translation of spoken language may permit erroneous, or at times, nonsensical words or phrases to be inadvertently transcribed; Although I have reviewed the note for such errors, some may still exist.

## 2017-01-25 NOTE — NURSING NOTE
Spoke with Asia Kim r/t patient being an anticipated discharge for tomorrow 1/26/17. She will call back to see if van is available for transportation. Will continue to follow.      spoke with Asia Kim and van is available starting @ 10:30 am 1/26/17.    Received call from Asia Kim and they do have a room for him r/t being MRSA +. They are ok with Zyvox p.o. Will continue to follow.

## 2017-01-25 NOTE — PROGRESS NOTES
"Hospitalist Team      Patient Care Team:  Thomas Amos MD as PCP - General  Thomas Amos MD as PCP - Family Medicine        Chief Complaint: F/U sepsis secondary to pneumonia, fecal impaction, hypernatremia    Subjective    Interval History and ROS:     Patient notes he's feeling ok.  States he's tired.  Denies any CP, SOA or cough.  Tolerating thickened liquids. Afebrile.    Objective    Vital Signs  Temp:  [97.5 °F (36.4 °C)-98.2 °F (36.8 °C)] 97.8 °F (36.6 °C)  Heart Rate:  [76-95] 91  Resp:  [18-20] 20  BP: ()/(49-76) 121/68  Oxygen Therapy  SpO2: 99 %  Pulse Oximetry Type: Continuous  O2 Device: room air    Flowsheet Rows         First Filed Value    Admission Height  71\" (180.3 cm) Documented at 01/21/2017 2035    Admission Weight  125 lb (56.7 kg) Documented at 01/21/2017 2151            Physical Exam:  Constitutional: He is well-developed, well-nourished, and in no distress. Sitting up in chair at the time of my interview.  HENT:   Head: Normocephalic and atraumatic.   Eyes: EOM are normal. No scleral icterus.   Neck: Neck supple. No JVD present.   Cardiovascular: Normal rate, regular rhythm and normal heart sounds. Exam reveals no gallop and no friction rub.   No murmur heard.  Pulmonary/Chest: Effort normal. No respiratory distress. He has no wheezes. He has no rales that I appreciate on exam today.   Diminished breath sounds at the bases  Abdominal: Soft. Bowel sounds are normal. He exhibits no distension. There is no tenderness.   Musculoskeletal: He exhibits no edema.   Neurological: He is alert.   Oriented to person and time but not place and a little less confused in conversation.     Results Review:     I reviewed the patient's new clinical results.    Lab Results (last 24 hours)     Procedure Component Value Units Date/Time    POC Glucose Fingerstick [[id number]]  (Abnormal) Collected:  01/24/17 1622    Specimen:  Blood Updated:  01/24/17 1634     Glucose 181 (H) mg/dL     Narrative:       " Meter: [id number] : [id number] Ofelia BAILEY    POC Glucose Fingerstick [[id number]]  (Abnormal) Collected:  01/24/17 2044    Specimen:  Blood Updated:  01/24/17 2050     Glucose 168 (H) mg/dL     Narrative:       Meter: [id number] : [id number] Sean Lezama    Blood Culture [[id number]]  (Normal) Collected:  01/21/17 2050    Specimen:  Blood from Blood, Venous Line Updated:  01/24/17 2201     Blood Culture No growth at 3 days     Blood Culture [[id number]]  (Normal) Collected:  01/21/17 2211    Specimen:  Blood from Blood, Venous Line Updated:  01/24/17 2301     Blood Culture No growth at 3 days     Vancomycin, Random [[id number]] Collected:  01/25/17 0054    Specimen:  Blood Updated:  01/25/17 0147     Vancomycin Random 17.40 mcg/mL     POC Glucose Fingerstick [[id number]]  (Abnormal) Collected:  01/25/17 0732    Specimen:  Blood Updated:  01/25/17 0745     Glucose 60 (L) mg/dL     Narrative:       Meter: [id number] : [id number] Shukri Watkins    POC Glucose Fingerstick [[id number]]  (Normal) Collected:  01/25/17 0800    Specimen:  Blood Updated:  01/25/17 0806     Glucose 83 mg/dL     Narrative:       Meter: [id number] : [id number] Shukri Watkins    POC Glucose Fingerstick [[id number]]  (Abnormal) Collected:  01/25/17 1127    Specimen:  Blood Updated:  01/25/17 1134     Glucose 235 (H) mg/dL     Narrative:       Meter: [id number] : [id number] Shukri Watkins          Imaging Results (last 24 hours)     Procedure Component Value Units Date/Time    FL Video Swallow With Speech [[id number]] Collected:  01/24/17 1602     Updated:  01/24/17 1606    Narrative:       INDICATION: Recent pneumonia and aspiration.     FINDINGS: Fluoroscopic guidance for speech pathology swallow study. The  patient was given various substances different thicknesses. No evidence  of laryngeal penetration or aspiration. Please refer to the speech  pathology report for full details. Fluoroscopic time 2. 35 minutes. One  image acquired.      This report was finalized on 1/24/2017 4:03 PM by Dr. Mason Barrios MD.             ECG/EMG Results (most recent)     Procedure Component Value Units Date/Time    ECG 12 Lead [[id number]] Collected:  01/21/17 2041     Updated:  01/22/17 2057    Narrative:       RR Interval= 469 ms  CA Interval= 152 ms  QRSD Interval= 100 ms  QT Interval= 304 ms  QTc Interval= 444 ms  Heart Rate= 128 ms  P Axis= 44 deg  QRS Axis= 1 deg  T Wave Axis= 38 deg  I: 40 Axis= -56 deg  T: 40 Axis= 16 deg  ST Axis= 109 deg  SINUS TACHYCARDIA  VENTRICULAR PREMATURE COMPLEX  PROBABLE INFERIOR INFARCT, AGE INDETERMINATE  CONSIDER POSTERIOR WALL INVOLVEMENT  NO SIGNIFICANT CHANGE FROM PREVIOUS ECG  Electronically Signed by:  Hawk Melendrez) ([facility]) 22-Jan-2017 20:56:35  Date and Time of Study: 2017-01-21 20:41:55    ECG 12 Lead [[id number]] Collected:  01/22/17 1148     Updated:  01/22/17 2058    Narrative:       RR Interval= 600 ms  CA Interval= 160 ms  QRSD Interval= 100 ms  QT Interval= 372 ms  QTc Interval= 480 ms  Heart Rate= 100 ms  P Axis= 44 deg  QRS Axis= 8 deg  T Wave Axis= 20 deg  I: 40 Axis= -44 deg  T: 40 Axis= 57 deg  ST Axis= 239 deg  SINUS TACHYCARDIA  VENTRICULAR PREMATURE COMPLEX  INFERIOR INFARCT, AGE INDETERMINATE  BORDERLINE PROLONGED QT INTERVAL  NO SIGNIFICANT CHANGE FROM PREVIOUS ECG  Electronically Signed by:  Hawk Melendrez) ([facility]) 22-Jan-2017 20:57:00  Date and Time of Study: 2017-01-22 11:48:12          Medication Review:   I have reviewed the patient's current medication list    Current Facility-Administered Medications:   •  acetaminophen-codeine (TYLENOL #3) 300-30 MG per tablet 1 tablet, 1 tablet, Oral, Q6H PRN, Edwin Rizo MD, 1 tablet at 01/22/17 2006  •  arformoterol (BROVANA) nebulizer solution 15 mcg, 15 mcg, Nebulization, BID - RT, Alec Bolton MD, 15 mcg at 01/25/17 0908  •  atorvastatin (LIPITOR) tablet 20 mg, 20 mg, Oral, Daily, Edwin Rizo MD, 20 mg at 01/25/17 0805  •   baclofen (LIORESAL) tablet 10 mg, 10 mg, Oral, TID, Edwin Rizo MD, 10 mg at 01/25/17 0805  •  bisacodyl (DULCOLAX) suppository 10 mg, 10 mg, Rectal, Daily PRN, Edwin Rizo MD, 10 mg at 01/22/17 1543  •  budesonide (PULMICORT) nebulizer solution 0.5 mg, 0.5 mg, Nebulization, BID, Edwin Rizo MD, 0.5 mg at 01/25/17 0903  •  cefepime (MAXIPIME) 1 g/100 mL 0.9% NS IVPB (mbp), 1 g, Intravenous, Once, Melvin Hawkins MD, 1 g at 01/22/17 0306  •  dextrose (D50W) solution 25 g, 25 g, Intravenous, Q15 Min PRN, Edwin Rizo MD  •  dextrose (D50W) solution 25 g, 25 g, Intravenous, Q15 Min PRN, Shailesh Worthy MD  •  dextrose (GLUTOSE) oral gel 15 g, 15 g, Oral, Q15 Min PRN, Edwin Rizo MD  •  dextrose (GLUTOSE) oral gel 15 g, 15 g, Oral, Q15 Min PRN, Shailesh Worthy MD  •  docusate sodium (COLACE) capsule 100 mg, 100 mg, Oral, BID, Edwin Rizo MD, 100 mg at 01/25/17 0805  •  enoxaparin (LOVENOX) syringe 80 mg, 80 mg, Subcutaneous, Q12H, Edwin Rizo MD, 80 mg at 01/25/17 0804  •  famotidine (PEPCID) tablet 20 mg, 20 mg, Oral, BID, Edwin Rizo MD, 20 mg at 01/25/17 0805  •  ferrous sulfate EC tablet 324 mg, 324 mg, Oral, BID With Meals, Edwin Rizo MD, 324 mg at 01/25/17 0805  •  fluticasone (FLONASE) 50 MCG/ACT nasal spray 2 spray, 2 spray, Nasal, Daily, Edwin Rizo MD, 2 spray at 01/25/17 0806  •  glucagon (GLUCAGEN) injection 1 mg, 1 mg, Subcutaneous, Q15 Min PRN, Edwin Rizo MD  •  glucagon (GLUCAGEN) injection 1 mg, 1 mg, Subcutaneous, Q15 Min PRN, Shailesh Worthy MD  •  guaiFENesin (MUCINEX) 12 hr tablet 600 mg, 600 mg, Oral, BID, Edwin Rizo MD, 600 mg at 01/25/17 0805  •  insulin aspart (novoLOG) injection 0-7 Units, 0-7 Units, Subcutaneous, 4x Daily AC & at Bedtime, Shailesh Worthy MD, 3 Units at 01/25/17 1231  •  insulin aspart (novoLOG) injection 3 Units, 3 Units,  Subcutaneous, TID With Meals, Edwin Rizo MD, 3 Units at 01/25/17 1231  •  insulin detemir (LEVEMIR) injection 23 Units, 23 Units, Subcutaneous, Q12H, Edwin Rizo MD, 23 Units at 01/25/17 0926  •  ipratropium-albuterol (DUO-NEB) nebulizer solution 3 mL, 3 mL, Nebulization, Q4H PRN, Edwin Rizo MD, 3 mL at 01/24/17 1048  •  isosorbide dinitrate (ISORDIL) tablet 10 mg, 10 mg, Oral, TID, Edwin Rizo MD, 10 mg at 01/25/17 0805  •  lactobacillus (BACID) caplet 1 tablet, 1 tablet, Oral, BID, Edwin Rizo MD, 1 tablet at 01/25/17 0805  •  [DISCONTINUED] aztreonam (AZACTAM) 2 g in sodium chloride 0.9 % 100 mL IVPB-MBP, 2 g, Intravenous, Q8H, Last Rate: 0 mL/hr at 01/23/17 1149, 2 g at 01/23/17 1746 **AND** levoFLOXacin (LEVAQUIN) 500 mg/100 mL D5W (premix) (LEVAQUIN) 500 mg, 500 mg, Intravenous, Q24H, Edwin Rizo MD, Last Rate: 0 mL/hr at 01/22/17 0826, 500 mg at 01/25/17 0346  •  montelukast (SINGULAIR) tablet 10 mg, 10 mg, Oral, Nightly, Edwin Rizo MD, 10 mg at 01/24/17 2131  •  nitroglycerin (NITROSTAT) SL tablet 0.4 mg, 0.4 mg, Sublingual, Q5 Min PRN, Shailesh Worthy MD  •  ondansetron (ZOFRAN) injection 4 mg, 4 mg, Intravenous, Q6H PRN, Edwin Rizo MD  •  [DISCONTINUED] vancomycin IVPB 1250 mg in 250 mL NS (premix), 1,250 mg, Intravenous, Once, 1,250 mg at 01/22/17 0305 **AND** Pharmacy to dose vancomycin, , Does not apply, Continuous PRN, Edwin Rizo MD  •  polyethylene glycol (MIRALAX) powder 17 g, 17 g, Oral, Daily, Angela Wiley MD, 17 g at 01/25/17 0925  •  potassium chloride (KLOR-CON) packet 20 mEq, 20 mEq, Oral, Daily, Edwin Rizo MD, 20 mEq at 01/25/17 0805  •  senna (SENOKOT) tablet 8.6 mg, 1 tablet, Oral, BID, Edwin Rizo MD, 8.6 mg at 01/25/17 0805  •  SITagliptin (JANUVIA) tablet 100 mg, 100 mg, Oral, Daily, Edwin Rizo MD, 100 mg at 01/25/17 0805  •   sodium chloride 0.9 % flush 1-10 mL, 1-10 mL, Intravenous, PRN, Edwin Rizo MD  •  Insert peripheral IV, , , Once **AND** sodium chloride 0.9 % flush 10 mL, 10 mL, Intravenous, PRN, Melvin Hawkins MD  •  vancomycin (VANCOCIN) 1,250 mg in sodium chloride 0.9 % 250 mL IVPB, 1,250 mg, Intravenous, Q12H, Edwin Rizo MD, Stopped at 01/25/17 0347      Assessment/Plan     1. Sepsis secondary to MRSA RLL Pneumonia: Concern for recurrent aspiration. MBSS without justice aspiration but per ST at very high risk (was very close to aspirating) so recommended to continue thickened liquids which patient is tolerating currently. PCT is low and WBC is now WNL. LA was mildly elevated at admit but is now WNL. Pulmonary following. Patient was on broad spectrum Abx's. De-escalated to levoquin and vanco, MRSA positive, I spoke with Dr. Bolton with pulmonary and continue MRSA coverage, was treated with doxycycline outpatient without improvement so he recommends zyvox for out-patient to complete course (plus levoquin), will change Abx's to po today. So far blood cultures NGTD and viral panel is negative. Patient afebrile. I contacted NH and patient was started on nectar thick liquids 1/15 but then changed back to thins 1/18 after FEES study showed no aspiration. Signs of reflux noted on FEES study. On pepcid BID per home regimen.      2. Hypernatremia and hypokalemia: Now slightly hyponatremic with D5W given earlier this admission, now off IVFs x >24hours.  Recheck BMP in AM. Secondary to poor po intake. Nutrition supplement initiated. potassium now NL, magnesium level WNL.      3. Acute urinary retention secondary to BPH: Patient does not have a chronic fuller at the NH, retention was noted in ER and fuller placed. Urine culture 1/4 negative. Patient with allergy to flomax. Catheter d/darron yesterday and patient is urinating.      4. Dementia: No acute issues. Patient confused in conversation. On no chronic meds for  this.      5. Diabetes Mellitis type 2: On Basal bolus insulin and on po januvia. Patient with good fasting blood glucose levels. Will increase mealtime insulin. Continue SSI and Accu checks.      6. Fecal impaction and constipation: disimpacted in ER. On bowel regimen. On senokot BID and colace and miralax daily here. + BMs, would hold miralax for loose stools.      7. COPD: No acute issues here. No wheezes on exam. On home pulmicort and started on Brovana by pulmonary. Continue duonebs PRN.      8. Hypertension: BP now WNL, on no chronic meds for this outpatient. Monitor.      9. Dyslipidemia: on statin.      10. CAD: On home imdur and statin. No acute issues here.      11. H/O CHF: Unclear type, patient not on ACEI/ARB, diuretic or BB so I suspect this is diastolic. No acute issues here.       12. Vitamin D deficiency: On supplementation.      13. GERD: On home pepcid, no acute issues. ? If could contribute to  aspiration as opposed to justice dysphagia, see #1 above.      14. Chronic Iron deficiency Anemia: On home iron supplementation.      15. 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 Lovenox continued here.    Plan for disposition: To [facility] tomorrow    Angela Wiley MD  01/25/17  1:21 PM

## 2017-01-25 NOTE — PLAN OF CARE
Problem: Patient Care Overview (Adult)  Goal: Plan of Care Review  Outcome: Ongoing (interventions implemented as appropriate)    01/24/17 1957   Coping/Psychosocial Response Interventions   Plan Of Care Reviewed With patient   Patient Care Overview   Progress improving       Goal: Adult Individualization and Mutuality  Outcome: Ongoing (interventions implemented as appropriate)    Problem: Pneumonia (Adult)  Intervention: Maximize Oxygenation/Ventilation/Perfusion    01/24/17 1957   Activity   Activity Type activity adjusted per tolerance   Promote Aggressive Pulmonary Hygiene/Secretion Management   Cough And Deep Breathing done with encouragement   Respiratory Interventions   Airway/Ventilation Management airway patency maintained   Positioning   Head Of Bed (HOB) Position HOB elevated

## 2017-01-26 VITALS
BODY MASS INDEX: 22.34 KG/M2 | TEMPERATURE: 98.6 F | RESPIRATION RATE: 20 BRPM | SYSTOLIC BLOOD PRESSURE: 127 MMHG | WEIGHT: 159.6 LBS | HEART RATE: 92 BPM | OXYGEN SATURATION: 97 % | HEIGHT: 71 IN | DIASTOLIC BLOOD PRESSURE: 74 MMHG

## 2017-01-26 LAB
ANION GAP SERPL CALCULATED.3IONS-SCNC: 10.6 MMOL/L
BACTERIA SPEC AEROBE CULT: NORMAL
BACTERIA SPEC AEROBE CULT: NORMAL
BASOPHILS # BLD AUTO: 0.03 10*3/MM3 (ref 0–0.2)
BASOPHILS NFR BLD AUTO: 0.4 % (ref 0–2)
BUN BLD-MCNC: 8 MG/DL (ref 8–23)
BUN/CREAT SERPL: 12.9 (ref 7–25)
CALCIUM SPEC-SCNC: 8.4 MG/DL (ref 8.8–10.5)
CHLORIDE SERPL-SCNC: 100 MMOL/L (ref 98–107)
CO2 SERPL-SCNC: 26.4 MMOL/L (ref 22–29)
CREAT BLD-MCNC: 0.62 MG/DL (ref 0.76–1.27)
DEPRECATED RDW RBC AUTO: 42.1 FL (ref 37–54)
EOSINOPHIL # BLD AUTO: 0.39 10*3/MM3 (ref 0.1–0.3)
EOSINOPHIL NFR BLD AUTO: 5.2 % (ref 0–4)
ERYTHROCYTE [DISTWIDTH] IN BLOOD BY AUTOMATED COUNT: 13.2 % (ref 11.5–14.5)
GFR SERPL CREATININE-BSD FRML MDRD: 125 ML/MIN/1.73
GLUCOSE BLD-MCNC: 77 MG/DL (ref 65–99)
GLUCOSE BLDC GLUCOMTR-MCNC: 182 MG/DL (ref 70–130)
GLUCOSE BLDC GLUCOMTR-MCNC: 71 MG/DL (ref 70–130)
GLUCOSE BLDC GLUCOMTR-MCNC: 88 MG/DL (ref 70–130)
HCT VFR BLD AUTO: 37.4 % (ref 42–52)
HGB BLD-MCNC: 12.1 G/DL (ref 14–18)
IMM GRANULOCYTES # BLD: 0.08 10*3/MM3 (ref 0–0.03)
IMM GRANULOCYTES NFR BLD: 1.1 % (ref 0–0.5)
LYMPHOCYTES # BLD AUTO: 1.56 10*3/MM3 (ref 0.6–4.8)
LYMPHOCYTES NFR BLD AUTO: 21 % (ref 20–45)
MCH RBC QN AUTO: 28.6 PG (ref 27–31)
MCHC RBC AUTO-ENTMCNC: 32.4 G/DL (ref 31–37)
MCV RBC AUTO: 88.4 FL (ref 80–94)
MONOCYTES # BLD AUTO: 0.59 10*3/MM3 (ref 0–1)
MONOCYTES NFR BLD AUTO: 7.9 % (ref 3–8)
NEUTROPHILS # BLD AUTO: 4.78 10*3/MM3 (ref 1.5–8.3)
NEUTROPHILS NFR BLD AUTO: 64.4 % (ref 45–70)
NRBC BLD MANUAL-RTO: 0 /100 WBC (ref 0–0)
PLATELET # BLD AUTO: 209 10*3/MM3 (ref 140–500)
PMV BLD AUTO: 11.3 FL (ref 7.4–10.4)
POTASSIUM BLD-SCNC: 4.1 MMOL/L (ref 3.5–5.2)
RBC # BLD AUTO: 4.23 10*6/MM3 (ref 4.7–6.1)
SODIUM BLD-SCNC: 137 MMOL/L (ref 136–145)
WBC NRBC COR # BLD: 7.43 10*3/MM3 (ref 4.8–10.8)

## 2017-01-26 PROCEDURE — 80048 BASIC METABOLIC PNL TOTAL CA: CPT | Performed by: HOSPITALIST

## 2017-01-26 PROCEDURE — 94640 AIRWAY INHALATION TREATMENT: CPT

## 2017-01-26 PROCEDURE — 63710000001 INSULIN ASPART PER 5 UNITS: Performed by: INTERNAL MEDICINE

## 2017-01-26 PROCEDURE — 63710000001 INSULIN ASPART PER 5 UNITS: Performed by: HOSPITALIST

## 2017-01-26 PROCEDURE — 63710000001 INSULIN DETEMIR PER 5 UNITS: Performed by: INTERNAL MEDICINE

## 2017-01-26 PROCEDURE — 25010000002 ENOXAPARIN PER 10 MG: Performed by: INTERNAL MEDICINE

## 2017-01-26 PROCEDURE — 99238 HOSP IP/OBS DSCHRG MGMT 30/<: CPT | Performed by: HOSPITALIST

## 2017-01-26 PROCEDURE — 85025 COMPLETE CBC W/AUTO DIFF WBC: CPT | Performed by: HOSPITALIST

## 2017-01-26 PROCEDURE — 82962 GLUCOSE BLOOD TEST: CPT

## 2017-01-26 RX ORDER — LEVOFLOXACIN 500 MG/1
500 TABLET, FILM COATED ORAL EVERY 24 HOURS
Refills: 0
Start: 2017-01-26 | End: 2017-02-01

## 2017-01-26 RX ORDER — NITROGLYCERIN 0.4 MG/1
0.4 TABLET SUBLINGUAL
Refills: 12
Start: 2017-01-26

## 2017-01-26 RX ORDER — GUAIFENESIN 600 MG/1
600 TABLET, EXTENDED RELEASE ORAL 2 TIMES DAILY
Qty: 16 TABLET | Refills: 0 | Status: SHIPPED | OUTPATIENT
Start: 2017-01-26 | End: 2017-02-03

## 2017-01-26 RX ORDER — LINEZOLID 600 MG/1
600 TABLET, FILM COATED ORAL EVERY 12 HOURS SCHEDULED
Refills: 0
Start: 2017-01-26 | End: 2017-02-01

## 2017-01-26 RX ORDER — PSEUDOEPHEDRINE HCL 30 MG
100 TABLET ORAL 2 TIMES DAILY
Refills: 0
Start: 2017-01-26

## 2017-01-26 RX ADMIN — FERROUS SULFATE TAB EC 324 MG (65 MG FE EQUIVALENT) 324 MG: 324 (65 FE) TABLET DELAYED RESPONSE at 08:43

## 2017-01-26 RX ADMIN — SITAGLIPTIN 100 MG: 100 TABLET, FILM COATED ORAL at 08:43

## 2017-01-26 RX ADMIN — LEVOFLOXACIN 500 MG: 500 TABLET, FILM COATED ORAL at 04:30

## 2017-01-26 RX ADMIN — INSULIN ASPART 5 UNITS: 100 INJECTION, SOLUTION INTRAVENOUS; SUBCUTANEOUS at 12:44

## 2017-01-26 RX ADMIN — BUDESONIDE 0.5 MG: 0.5 SUSPENSION RESPIRATORY (INHALATION) at 08:02

## 2017-01-26 RX ADMIN — SENNOSIDES 8.6 MG: 8.6 TABLET, FILM COATED ORAL at 08:48

## 2017-01-26 RX ADMIN — ISOSORBIDE DINITRATE 10 MG: 10 TABLET ORAL at 08:43

## 2017-01-26 RX ADMIN — INSULIN ASPART 2 UNITS: 100 INJECTION, SOLUTION INTRAVENOUS; SUBCUTANEOUS at 12:43

## 2017-01-26 RX ADMIN — FAMOTIDINE 20 MG: 20 TABLET, FILM COATED ORAL at 08:43

## 2017-01-26 RX ADMIN — POTASSIUM CHLORIDE 20 MEQ: 1.5 POWDER, FOR SOLUTION ORAL at 08:43

## 2017-01-26 RX ADMIN — LINEZOLID 600 MG: 600 TABLET, FILM COATED ORAL at 10:30

## 2017-01-26 RX ADMIN — GUAIFENESIN 600 MG: 600 TABLET, EXTENDED RELEASE ORAL at 08:43

## 2017-01-26 RX ADMIN — DOCUSATE SODIUM 100 MG: 100 CAPSULE, LIQUID FILLED ORAL at 08:43

## 2017-01-26 RX ADMIN — Medication 1 TABLET: at 08:43

## 2017-01-26 RX ADMIN — ENOXAPARIN SODIUM 80 MG: 80 INJECTION SUBCUTANEOUS at 08:43

## 2017-01-26 RX ADMIN — POLYETHYLENE GLYCOL (3350) 17 G: 17 POWDER, FOR SOLUTION ORAL at 10:29

## 2017-01-26 RX ADMIN — BACLOFEN 10 MG: 10 TABLET ORAL at 08:43

## 2017-01-26 RX ADMIN — FLUTICASONE PROPIONATE 2 SPRAY: 50 SPRAY, METERED NASAL at 08:43

## 2017-01-26 RX ADMIN — ATORVASTATIN CALCIUM 20 MG: 20 TABLET, FILM COATED ORAL at 08:43

## 2017-01-26 RX ADMIN — ARFORMOTEROL TARTRATE 15 MCG: 15 SOLUTION RESPIRATORY (INHALATION) at 08:07

## 2017-01-26 RX ADMIN — INSULIN DETEMIR 23 UNITS: 100 INJECTION, SOLUTION SUBCUTANEOUS at 08:56

## 2017-01-26 NOTE — DISCHARGE SUMMARY
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  Vitamin D Defficiency  Chronic iron defficiency anemia  History of Femoral DVT  Fecal Impaction  T12 compression fracture    PCP  Patient Care Team:  Thomas Amos MD as PCP - General  Thomas Amos MD as PCP - Family Medicine    Consults:   Consults     Date and Time Order Name Status Description    1/22/2017 0826 Inpatient Consult to Pulmonology Completed           Operations and Procedures Performed:       Ct Abdomen Pelvis Without Contrast    Result Date: 1/5/2017  Narrative: CT ABDOMEN AND PELVIS, NONCONTRAST, 01/05/2017:  HISTORY: 81-year-old male in the ED with persistent left flank pain and hematuria after a fall four days ago. Outside x-rays at that time reportedly showed a T12 vertebral compression fracture. He has a history of left acetabulum and left pelvis fracture August 2015.  TECHNIQUE: CT examination of the abdomen and pelvis was performed without oral or IV contrast. Some of the images are degraded by patient respiratory motion artifact. The patient breathed throughout the examination. Radiation dose reduction techniques were utilized, including automated exposure control and exposure modulation based on body size.  ABDOMEN FINDINGS: Liver, pancreas, spleen and kidneys are normal in noncontrast CT appearance. No evidence of solid organ injury. Small bowel and colon are normal in caliber. No fluid collection is seen within the abdomen or pelvis. Normal caliber abdominal aorta.  PELVIS FINDINGS: Distended urinary bladder  with marked enlargement of the prostate. The appearance suggests potential bladder outflow obstruction which may be chronic.  SKELETAL FINDINGS: Mild superior endplate vertebral compression fracture T12 resulting in very little loss of vertebral body height. Healed left inferior pubic ramus fracture. Healed left. Acetabular fracture. Left posterior 11th rib fracture is noted, likely not acute.      Impression: 1. Mild superior endplate vertebral compression fracture at T12 appears recent. 2. Left posterior 11th rib fracture does not appear acute. 3. Previous fractures of the left inferior pubic ramus and left superior acetabulum are healed. 4. Marked prostate enlargement and distended urinary bladder suggesting bladder outflow obstruction. No evidence of upper urinary tract obstruction. 5. No evidence of acute traumatic soft tissue injury within the abdomen or pelvis. 6. Preliminary stat report to the ED from Dr. Mason Barrios at 0052 hours on 01/05/2017.  This report was finalized on 1/5/2017 6:43 AM by Dr. Josesito Michel MD.      Ct Abdomen Pelvis With Contrast    Result Date: 1/22/2017  Narrative: Exam: CT of the abdomen and pelvis with IV contrast media.  HISTORY: Nursing home patient with elevated blood sugar distended abdomen and urinary retention. Elevated white blood cell count and trace microhematuria.  COMPARISON: 01/05/2017  TECHNIQUE: Axial imaging of the abdomen and pelvis was performed with IV contrast media.  FINDINGS: There is underlying emphysematous lung disease and chronic bronchiectasis. There is a new infiltrate in the right lower lobe. There are advanced atherosclerotic calcifications in the coronary arteries. Small hiatal hernia is present. Liver gallbladder spleen and adrenal glands are normal. Pancreas is unremarkable. The right and left kidney are normal. There are dense atherosclerotic calcifications throughout the aorta and iliac systems area Perry catheter is present in the bladder.  Subcutaneous soft tissue density and air is seen in the anterior abdominal wall likely reflecting injections area Perry catheter is in the bladder with prostate enlargement. There is a large fecal ball within the rectal vault and there is colonic distention proximally consistent with impaction small bowel is nondilated. No free air intra-abdominally. There are advanced degenerative changes throughout the lumbar spine. There is a T12 compression fracture with progression since 01/05/2017.      Impression: Fecal impaction with colonic distention. Right lower lobe infiltrate consistent with pneumonia. Interval progression of the patient's T12 compression fracture.  This report was finalized on 1/22/2017 8:14 AM by Dr. Carlos Alberto Hylton MD.      Fl Video Swallow With Speech    Result Date: 1/24/2017  Narrative: INDICATION: Recent pneumonia and aspiration.  FINDINGS: Fluoroscopic guidance for speech pathology swallow study. The patient was given various substances different thicknesses. No evidence of laryngeal penetration or aspiration. Please refer to the speech pathology report for full details. Fluoroscopic time 2. 35 minutes. One image acquired.  This report was finalized on 1/24/2017 4:03 PM by Dr. Mason Barrios MD.      Xr Chest 1 View    Result Date: 1/22/2017  Narrative: Exam: AP portable chest.  HISTORY: Nonverbal nursing home patient with altered mental status and low back pain for 4 days. Recent fall.  FINDINGS: An AP view of the chest is obtained. Cardiac size remains normal. Chronic pulmonary parenchymal scarring right upper lobe. No acute findings.      Impression: Negative portable chest. No acute findings. Signed report  This report was finalized on 1/22/2017 8:35 AM by Dr. Carlos Alberto Hyltno MD.        Allergies:  is allergic to flomax [tamsulosin hcl]; penicillins; pravachol [pravastatin]; and prevacid [lansoprazole].    Pablo  not reviewed.  Tucker is a resident of a  Nursing home.    Discharge Medications:    DaljitFranklinha HENNY   Home Medication Instructions ADRIA:[id number]    Printed on:01/26/17 0998   Medication Information                      ACETAMINOPHEN-CODEINE #3 PO  Take 1 tablet by mouth 3 (Three) Times a Day.             baclofen (LIORESAL) 10 MG tablet  Take 10 mg by mouth 3 (Three) Times a Day.             budesonide (PULMICORT) 0.5 MG/2ML nebulizer solution  Take 0.5 mg by nebulization 2 (Two) Times a Day.             bumetanide (BUMEX) 1 MG tablet  Take 1 mg by mouth 2 (Two) Times a Day. Patient takes 2 mg in the morning at 0800; 1 mg in the afternoon at 1400             docusate sodium 100 MG capsule  Take 100 mg by mouth 2 (Two) Times a Day.             enoxaparin (LOVENOX) 80 MG/0.8ML solution syringe  Inject 80 mg under the skin Every 12 (Twelve) Hours.             famotidine (PEPCID) 20 MG tablet  Take 20 mg by mouth 2 (Two) Times a Day.             ferrous sulfate 220 (44 FE) MG/5ML solution  Take 325.6 mg by mouth Daily.             fluticasone (FLONASE) 50 MCG/ACT nasal spray  2 sprays into each nostril Daily.             guaiFENesin (MUCINEX) 600 MG 12 hr tablet  Take 1 tablet by mouth 2 (Two) Times a Day for 8 days.             insulin aspart (novoLOG) 100 UNIT/ML injection  Inject 0-7 Units under the skin 4 (Four) Times a Day Before Meals & at Bedtime.             Insulin Glargine (LANTUS SOLOSTAR) 100 UNIT/ML injection pen  Inject 44 Units under the skin Daily.             Insulin Lispro (HUMALOG SC)  Inject 3 Units under the skin 3 (Three) Times a Day With Meals. Hold if blood sugar is below 100             ipratropium-albuterol (DUO-NEB) 0.5-2.5 mg/mL nebulizer  Take 3 mL by nebulization Every 4 (Four) Hours As Needed for wheezing.             isosorbide dinitrate (ISORDIL) 10 MG tablet  Take 10 mg by mouth 3 (Three) Times a Day.             levoFLOXacin (LEVAQUIN) 500 MG tablet  Take 1 tablet by mouth Daily for 6 doses. Indications: Pneumonia, Infection of Blood or Tissues affecting the Whole  Body             linezolid (ZYVOX) 600 MG tablet  Take 1 tablet by mouth Every 12 (Twelve) Hours for 6 days. Indications: Pneumonia, Infection of Blood or Tissues affecting the Whole Body             nitroglycerin (NITROSTAT) 0.4 MG SL tablet  Place 1 tablet under the tongue Every 5 (Five) Minutes As Needed for chest pain. Take no more than 3 doses in 15 minutes.             potassium chloride (KAYCIEL) 20 MEQ/15ML (10%) solution  Take 20 mEq by mouth Daily.             Probiotic Product (PROBIOTIC DAILY PO)  Take 1 tablet by mouth 2 (Two) Times a Day.             Sennosides (SENNA) 8.8 MG/5ML syrup  Take 5 mL by mouth Every Night.             simvastatin (ZOCOR) 10 MG tablet  Take 10 mg by mouth Every Night.             SITagliptin (JANUVIA) 100 MG tablet  Take 100 mg by mouth Daily.             vitamin D (ERGOCALCIFEROL) 89197 UNITS capsule capsule  Take 50,000 Units by mouth Every 7 (Seven) Days.                 History of Present Illness:    81 year old male came to ER with a blood sugar of 560.  Temp was 100.5.  RR was 32 and the heart rate was 131 which is concern for SIRs.  O2 Saturation was 90% on room air on admission.      Hospital Course   Sepsis bundle was ordered.  Lactic acid was 2.2.  Antibiotics were started.  Also he had urinary retention, dehydration, FREDY, Constipation/fecal impaction, tachycardia.    Perry was placed and constipation treated with laxatives.  Iv fluids started.  Bumex was held and diabetes was treated and monitored closely.  MRSA grew in the sputum and the patient will go home on Levaquin and zyvox.  He is a high risk for aspiration and it is recommended that he continue on thickened liquids.     Last Lab Results:   Lab Results (most recent)     Procedure Component Value Units Date/Time    CBC & Differential [[id number]] Collected:  01/21/17 2054    Specimen:  Blood Updated:  01/21/17 2110    Narrative:       The following orders were created for panel order CBC &  Differential.  Procedure                               Abnormality         Status                     ---------                               -----------         ------                     CBC Auto Differential[[id number]]         Abnormal            Final result                 Please view results for these tests on the individual orders.    CBC Auto Differential [[id number]]  (Abnormal) Collected:  01/21/17 2054    Specimen:  Blood Updated:  01/21/17 2110     WBC 17.00 (H) 10*3/mm3      RBC 5.47 10*6/mm3      Hemoglobin 15.9 g/dL      Hematocrit 48.7 %      MCV 89.0 fL      MCH 29.1 pg      MCHC 32.6 g/dL      RDW 13.2 %      RDW-SD 43.2 fl      MPV 11.6 (H) fL      Platelets 344 10*3/mm3      Neutrophil % 88.7 (H) %      Lymphocyte % 5.1 (L) %      Monocyte % 5.4 %      Eosinophil % 0.0 %      Basophil % 0.3 %      Immature Grans % 0.5 %      Neutrophils, Absolute 15.10 (H) 10*3/mm3      Lymphocytes, Absolute 0.86 10*3/mm3      Monocytes, Absolute 0.91 10*3/mm3      Eosinophils, Absolute 0.00 (L) 10*3/mm3      Basophils, Absolute 0.05 10*3/mm3      Immature Grans, Absolute 0.08 (H) 10*3/mm3      nRBC 0.0 /100 WBC     Urinalysis With / Culture If Indicated [[id number]]  (Abnormal) Collected:  01/21/17 2055    Specimen:  Urine from Urine, Catheter Updated:  01/21/17 2114     Color, UA Yellow      Appearance, UA Clear      pH, UA 6.0      Specific Gravity, UA 1.010      Glucose, UA >=1000 mg/dL (3+) (A)      Ketones, UA Negative      Bilirubin, UA Negative      Blood, UA Trace (A)      Protein, UA Negative      Leuk Esterase, UA Negative      Nitrite, UA Negative      Urobilinogen, UA 0.2 E.U./dL     Urinalysis, Microscopic Only [[id number]]  (Abnormal) Collected:  01/21/17 2055    Specimen:  Urine from Urine, Catheter Updated:  01/21/17 2118     RBC, UA 6-12 (A) /HPF      WBC, UA 0-2 (A) /HPF      Bacteria, UA Trace (A) /HPF      Squamous Epithelial Cells, UA None Seen /HPF      Hyaline Casts, UA 13-20 /LPF       Methodology Manual Light Microscopy     Influenza Antigen [[id number]]  (Normal) Collected:  01/21/17 2055    Specimen:  Swab from Nasopharynx Updated:  01/21/17 2119     Influenza A Ag, EIA Negative      Influenza B Ag, EIA Negative     Lactic Acid, Plasma [[id number]]  (Abnormal) Collected:  01/21/17 2054    Specimen:  Blood Updated:  01/21/17 2127     Lactate 2.2 (C) mmol/L     Comprehensive Metabolic Panel [[id number]]  (Abnormal) Collected:  01/21/17 2054    Specimen:  Blood Updated:  01/21/17 2133     Glucose 564 (C) mg/dL      BUN 25 (H) mg/dL      Creatinine 1.24 mg/dL      Sodium 147 (H) mmol/L      Potassium 4.4 mmol/L      Chloride 104 mmol/L      CO2 25.8 mmol/L      Calcium 9.8 mg/dL      Total Protein 8.2 g/dL      Albumin 3.70 g/dL      ALT (SGPT) 12 U/L      AST (SGOT) 13 U/L      Alkaline Phosphatase 181 (H) U/L      Total Bilirubin 0.5 mg/dL      eGFR Non African Amer 56 (L) mL/min/1.73      Globulin 4.5 gm/dL      A/G Ratio 0.8 g/dL      BUN/Creatinine Ratio 20.2      Anion Gap 17.2 mmol/L     Narrative:       The MDRD GFR formula is only valid for adults with stable renal function between ages 18 and 70.    POC Glucose Fingerstick [[id number]]  (Abnormal) Collected:  01/21/17 2243    Specimen:  Blood Updated:  01/21/17 2249     Glucose 303 (H) mg/dL     Narrative:       Meter: [id number] : [id number] Te Lezama    HIV-1 / O / 2 Ag / Antibody 4th Generation [[id number]]  (Normal) Collected:  01/21/17 2314    Specimen:  Blood Updated:  01/21/17 2348     HIV-1/ HIV-2 Negative      HIV-1 P24 Ag Screen Non-Reactive     Lactate Acid, Reflex [[id number]]  (Normal) Collected:  01/22/17 0108    Specimen:  Blood Updated:  01/22/17 0123     Lactate 1.8 mmol/L     POC Glucose Fingerstick [[id number]]  (Abnormal) Collected:  01/22/17 0216    Specimen:  Blood Updated:  01/22/17 0224     Glucose 198 (H) mg/dL     Narrative:       Meter: [id number] : [id number] Naomi Machuca    Lactic Acid, Plasma [[id number]]   (Normal) Collected:  01/22/17 0549    Specimen:  Blood Updated:  01/22/17 0633     Lactate 1.4 mmol/L     Magnesium [[id number]]  (Normal) Collected:  01/22/17 0549    Specimen:  Blood Updated:  01/22/17 0647     Magnesium 2.3 mg/dL     CBC & Differential [[id number]] Collected:  01/22/17 0549    Specimen:  Blood Updated:  01/22/17 0700    Narrative:       The following orders were created for panel order CBC & Differential.  Procedure                               Abnormality         Status                     ---------                               -----------         ------                     CBC Auto Differential[[id number]]         Abnormal            Final result                 Please view results for these tests on the individual orders.    CBC Auto Differential [[id number]]  (Abnormal) Collected:  01/22/17 0549    Specimen:  Blood Updated:  01/22/17 0700     WBC 14.41 (H) 10*3/mm3      RBC 4.59 (L) 10*6/mm3      Hemoglobin 13.4 (L) g/dL      Hematocrit 41.9 (L) %      MCV 91.3 fL      MCH 29.2 pg      MCHC 32.0 g/dL      RDW 13.3 %      RDW-SD 44.7 fl      MPV 11.4 (H) fL      Platelets 268 10*3/mm3      Neutrophil % 80.4 (H) %      Lymphocyte % 12.4 (L) %      Monocyte % 6.4 %      Eosinophil % 0.1 %      Basophil % 0.3 %      Immature Grans % 0.4 %      Neutrophils, Absolute 11.59 (H) 10*3/mm3      Lymphocytes, Absolute 1.79 10*3/mm3      Monocytes, Absolute 0.92 10*3/mm3      Eosinophils, Absolute 0.01 (L) 10*3/mm3      Basophils, Absolute 0.04 10*3/mm3      Immature Grans, Absolute 0.06 (H) 10*3/mm3      nRBC 0.0 /100 WBC     Comprehensive Metabolic Panel [[id number]]  (Abnormal) Collected:  01/22/17 0549    Specimen:  Blood Updated:  01/22/17 0700     Glucose 195 (H) mg/dL      BUN 19 mg/dL      Creatinine 0.91 mg/dL      Sodium 154 (H) mmol/L      Potassium 3.5 mmol/L      Chloride 113 (H) mmol/L      CO2 30.0 (H) mmol/L      Calcium 9.0 mg/dL      Total Protein 6.5 g/dL      Albumin 3.00 (L) g/dL      ALT  "(SGPT) 9 U/L      AST (SGOT) 13 U/L      Alkaline Phosphatase 137 (H) U/L      Total Bilirubin 0.3 mg/dL      eGFR Non African Amer 80 mL/min/1.73      Globulin 3.5 gm/dL      A/G Ratio 0.9 g/dL      BUN/Creatinine Ratio 20.9      Anion Gap 11.0 mmol/L     Narrative:       The MDRD GFR formula is only valid for adults with stable renal function between ages 18 and 70.    POC Glucose Fingerstick [[id number]]  (Abnormal) Collected:  01/22/17 0725    Specimen:  Blood Updated:  01/22/17 0732     Glucose 156 (H) mg/dL     Narrative:       Meter: [id number] : [id number] [employer] Divine Chavira    POC Glucose Fingerstick [[id number]]  (Abnormal) Collected:  01/22/17 1145    Specimen:  Blood Updated:  01/22/17 1153     Glucose 225 (H) mg/dL     Narrative:       Meter: [id number] : [id number] [employer] Divine Chavira    Procalcitonin [[id number]]  (Normal) Collected:  01/22/17 0549    Specimen:  Blood Updated:  01/22/17 1337     Procalcitonin 0.15 ng/mL     Narrative:       As a Marker for Sepsis (Non-Neonates):   1. <0.5 ng/mL represents a low risk of severe sepsis and/or septic shock.  1. >2 ng/mL represents a high risk of severe sepsis and/or septic shock.    As a Marker for Lower Respiratory Tract Infections that require antibiotic therapy:  PCT on Admission     Antibiotic Therapy             6-12 Hrs later  > 0.5                Strongly Recommended            >0.25 - <0.5         Recommended  0.1 - 0.25           Discouraged                   Remeasure/reassess PCT  <0.1                 Strongly Discouraged          Remeasure/reassess PCT      As 28 day mortality risk marker: \"Change in Procalcitonin Result\" (> 80 % or <=80 %) if Day 0 (or Day 1) and Day 4 values are available. Refer to http://[email or web address]-pct-calculator.com/   Change in PCT <=80 %   A decrease of PCT levels below or equal to 80 % defines a positive change in PCT test result representing a higher risk for 28-day all-cause mortality of patients diagnosed with severe " sepsis or septic shock.  Change in PCT > 80 %   A decrease of PCT levels of more than 80 % defines a negative change in PCT result representing a lower risk for 28-day all-cause mortality of patients diagnosed with severe sepsis or septic shock.                Hepatitis B Surface Antigen [[id number]]  (Normal) Collected:  01/21/17 2316    Specimen:  Blood Updated:  01/22/17 1347     Hepatitis B Surface Ag Non-Reactive     Respiratory Panel, PCR [[id number]]  (Normal) Collected:  01/22/17 1115    Specimen:  Swab from Nasopharynx Updated:  01/22/17 1420     ADENOVIRUS, PCR Not Detected      Coronavirus 229E Not Detected      Coronavirus HKU1 Not Detected      Coronavirus NL63 Not Detected      Coronavirus OC43 Not Detected      Human Metapneumovirus Not Detected      Human Rhinovirus/Enterovirus Not Detected      Influenza B PCR Not Detected      Parainfluenza Virus 1 Not Detected      Parainfluenza Virus 2 Not Detected      Parainfluenza Virus 3 Not Detected      Parainfluenza Virus 4 Not Detected      Bordetella pertussis pcr Not Detected      Influenza 2009 H1N1 by PCR Not Detected      Chlamydophila pneumoniae PCR Not Detected      Mycoplasma pneumo by PCR Not Detected      Influenza A PCR Not Detected      Influenza A H3 Not Detected      Influenza A H1 Not Detected      RSV, PCR Not Detected     POC Glucose Fingerstick [[id number]]  (Abnormal) Collected:  01/22/17 1640    Specimen:  Blood Updated:  01/22/17 1647     Glucose 156 (H) mg/dL     Narrative:       Meter: [id number] : [id number] Sukhjinder Ernst    POC Glucose Fingerstick [[id number]]  (Abnormal) Collected:  01/22/17 2025    Specimen:  Blood Updated:  01/22/17 2032     Glucose 222 (H) mg/dL     Narrative:       Meter: [id number] : [id number] Mecca Carlson    CBC & Differential [[id number]] Collected:  01/23/17 0411    Specimen:  Blood Updated:  01/23/17 0424    Narrative:       The following orders were created for panel order CBC &  Differential.  Procedure                               Abnormality         Status                     ---------                               -----------         ------                     CBC Auto Differential[[id number]]         Abnormal            Final result                 Please view results for these tests on the individual orders.    CBC Auto Differential [[id number]]  (Abnormal) Collected:  01/23/17 0411    Specimen:  Blood Updated:  01/23/17 0424     WBC 10.59 10*3/mm3      RBC 4.06 (L) 10*6/mm3      Hemoglobin 11.7 (L) g/dL      Hematocrit 37.2 (L) %      MCV 91.6 fL      MCH 28.8 pg      MCHC 31.5 g/dL      RDW 13.4 %      RDW-SD 45.3 fl      MPV 11.5 (H) fL      Platelets 196 10*3/mm3      Neutrophil % 69.6 %      Lymphocyte % 20.4 %      Monocyte % 6.1 %      Eosinophil % 3.3 %      Basophil % 0.3 %      Immature Grans % 0.3 %      Neutrophils, Absolute 7.37 10*3/mm3      Lymphocytes, Absolute 2.16 10*3/mm3      Monocytes, Absolute 0.65 10*3/mm3      Eosinophils, Absolute 0.35 (H) 10*3/mm3      Basophils, Absolute 0.03 10*3/mm3      Immature Grans, Absolute 0.03 10*3/mm3      nRBC 0.0 /100 WBC     Lactic Acid, Plasma [[id number]]  (Normal) Collected:  01/23/17 0411    Specimen:  Blood Updated:  01/23/17 0435     Lactate 0.8 mmol/L     Comprehensive Metabolic Panel [[id number]]  (Abnormal) Collected:  01/23/17 0411    Specimen:  Blood Updated:  01/23/17 0510     Glucose 92 mg/dL      BUN 12 mg/dL      Creatinine 0.76 mg/dL      Sodium 143 mmol/L      Potassium 3.3 (L) mmol/L      Chloride 108 (H) mmol/L      CO2 26.3 mmol/L      Calcium 8.1 (L) mg/dL      Total Protein 5.5 (L) g/dL      Albumin 2.30 (L) g/dL      ALT (SGPT) 6 U/L      AST (SGOT) 12 U/L      Alkaline Phosphatase 106 U/L      Total Bilirubin 0.4 mg/dL      eGFR Non African Amer 98 mL/min/1.73      Globulin 3.2 gm/dL      A/G Ratio 0.7 g/dL      BUN/Creatinine Ratio 15.8      Anion Gap 8.7 mmol/L     Narrative:       The MDRD GFR formula is only  valid for adults with stable renal function between ages 18 and 70.    POC Glucose Fingerstick [[id number]]  (Abnormal) Collected:  01/23/17 0800    Specimen:  Blood Updated:  01/23/17 0825     Glucose 67 (L) mg/dL     Narrative:       Meter: [id number] : [id number] [employer] PCA    POC Glucose Fingerstick [[id number]]  (Normal) Collected:  01/23/17 0823    Specimen:  Blood Updated:  01/23/17 0831     Glucose 76 mg/dL     Narrative:       Meter: [id number] : [id number] Faulker Fang PCA    POC Glucose Fingerstick [[id number]]  (Abnormal) Collected:  01/21/17 2034    Specimen:  Blood Updated:  01/23/17 1141     Glucose 511 (C) mg/dL     Narrative:       Physician Notified Meter: [id number] : [id number] Matthias Infante    POC Glucose Fingerstick [[id number]]  (Abnormal) Collected:  01/23/17 1149    Specimen:  Blood Updated:  01/23/17 1205     Glucose 237 (H) mg/dL     Narrative:       Meter: [id number] : [id number] [employer] PCA    Vancomycin, Random [[id number]] Collected:  01/23/17 1154    Specimen:  Blood Updated:  01/23/17 1219     Vancomycin Random 12.10 mcg/mL     Hepatitis C Antibody [[id number]]  (Normal) Collected:  01/21/17 2316    Specimen:  Blood Updated:  01/23/17 1358     Hepatitis C Ab Non-Reactive     POC Glucose Fingerstick [[id number]]  (Normal) Collected:  01/23/17 1635    Specimen:  Blood Updated:  01/23/17 1647     Glucose 115 mg/dL     Narrative:       Meter: [id number] : [id number] Yuriy Chavira    POC Glucose Fingerstick [[id number]]  (Abnormal) Collected:  01/23/17 2007    Specimen:  Blood Updated:  01/23/17 2014     Glucose 193 (H) mg/dL     Narrative:       Meter: [id number] : [id number] Sean Lezama    Magnesium [[id number]]  (Normal) Collected:  01/24/17 0353    Specimen:  Blood Updated:  01/24/17 0440     Magnesium 2.0 mg/dL     Basic Metabolic Panel [[id number]]  (Abnormal) Collected:  01/24/17 0353    Specimen:  Blood Updated:  01/24/17 0501     Glucose 159 (H) mg/dL       BUN 9 mg/dL      Creatinine 0.64 (L) mg/dL      Sodium 135 (L) mmol/L      Potassium 3.7 mmol/L      Chloride 101 mmol/L      CO2 25.5 mmol/L      Calcium 8.2 (L) mg/dL      eGFR Non African Amer 120 mL/min/1.73      BUN/Creatinine Ratio 14.1      Anion Gap 8.5 mmol/L     Narrative:       The MDRD GFR formula is only valid for adults with stable renal function between ages 18 and 70.    POC Glucose Fingerstick [[id number]]  (Normal) Collected:  01/24/17 0728    Specimen:  Blood Updated:  01/24/17 0739     Glucose 82 mg/dL     Narrative:       Meter: [id number] : [id number] M[employer] Teresita PCA    MRSA Screen [[id number]]  (Abnormal) Collected:  01/22/17 1115    Specimen:  Swab from [facility] Updated:  01/24/17 0922     [facility] SCREEN CX Staphylococcus aureus, MRSA (C)         Methicillin resistant Staphylococcus aureus, Patient may be an isolation risk.       POC Glucose Fingerstick [[id number]]  (Abnormal) Collected:  01/24/17 1149    Specimen:  Blood Updated:  01/24/17 1214     Glucose 253 (H) mg/dL     Narrative:       Meter: [id number] : [id number] Mings Teresita PCA    POC Glucose Fingerstick [[id number]]  (Abnormal) Collected:  01/24/17 1622    Specimen:  Blood Updated:  01/24/17 1634     Glucose 181 (H) mg/dL     Narrative:       Meter: [id number] : [id number] Mings Teresita PCA    POC Glucose Fingerstick [[id number]]  (Abnormal) Collected:  01/24/17 2044    Specimen:  Blood Updated:  01/24/17 2050     Glucose 168 (H) mg/dL     Narrative:       Meter: [id number] : [id number] Sean Lezama    Vancomycin, Random [[id number]] Collected:  01/25/17 0054    Specimen:  Blood Updated:  01/25/17 0147     Vancomycin Random 17.40 mcg/mL     POC Glucose Fingerstick [[id number]]  (Abnormal) Collected:  01/25/17 0732    Specimen:  Blood Updated:  01/25/17 0745     Glucose 60 (L) mg/dL     Narrative:       Meter: [id number] : [id number] Shukri Watkins    POC Glucose Fingerstick [[id number]]  (Normal) Collected:  01/25/17 0800     Specimen:  Blood Updated:  01/25/17 0806     Glucose 83 mg/dL     Narrative:       Meter: [id number] : [id number] Shukri June    POC Glucose Fingerstick [[id number]]  (Abnormal) Collected:  01/25/17 1127    Specimen:  Blood Updated:  01/25/17 1134     Glucose 235 (H) mg/dL     Narrative:       Meter: [id number] : [id number] Shukri June    POC Glucose Fingerstick [[id number]]  (Abnormal) Collected:  01/25/17 1622    Specimen:  Blood Updated:  01/25/17 1632     Glucose 166 (H) mg/dL     Narrative:       Meter: [id number] : [id number] Shukri June    POC Glucose Fingerstick [[id number]]  (Abnormal) Collected:  01/25/17 2025    Specimen:  Blood Updated:  01/25/17 2034     Glucose 159 (H) mg/dL     Narrative:       Meter: [id number] : [id number] Mecca Robyn    Blood Culture [[id number]]  (Normal) Collected:  01/21/17 2050    Specimen:  Blood from Blood, Venous Line Updated:  01/25/17 2201     Blood Culture No growth at 4 days     Blood Culture [[id number]]  (Normal) Collected:  01/21/17 2211    Specimen:  Blood from Blood, Venous Line Updated:  01/25/17 2301     Blood Culture No growth at 4 days     CBC & Differential [[id number]] Collected:  01/26/17 0400    Specimen:  Blood Updated:  01/26/17 0426    Narrative:       The following orders were created for panel order CBC & Differential.  Procedure                               Abnormality         Status                     ---------                               -----------         ------                     CBC Auto Differential[[id number]]         Abnormal            Final result                 Please view results for these tests on the individual orders.    CBC Auto Differential [[id number]]  (Abnormal) Collected:  01/26/17 0400    Specimen:  Blood Updated:  01/26/17 0426     WBC 7.43 10*3/mm3      RBC 4.23 (L) 10*6/mm3      Hemoglobin 12.1 (L) g/dL      Hematocrit 37.4 (L) %      MCV 88.4 fL      MCH 28.6 pg      MCHC 32.4 g/dL      RDW 13.2 %      RDW-SD  42.1 fl      MPV 11.3 (H) fL      Platelets 209 10*3/mm3      Neutrophil % 64.4 %      Lymphocyte % 21.0 %      Monocyte % 7.9 %      Eosinophil % 5.2 (H) %      Basophil % 0.4 %      Immature Grans % 1.1 (H) %      Neutrophils, Absolute 4.78 10*3/mm3      Lymphocytes, Absolute 1.56 10*3/mm3      Monocytes, Absolute 0.59 10*3/mm3      Eosinophils, Absolute 0.39 (H) 10*3/mm3      Basophils, Absolute 0.03 10*3/mm3      Immature Grans, Absolute 0.08 (H) 10*3/mm3      nRBC 0.0 /100 WBC     Basic Metabolic Panel [[id number]]  (Abnormal) Collected:  01/26/17 0400    Specimen:  Blood Updated:  01/26/17 0443     Glucose 77 mg/dL      BUN 8 mg/dL      Creatinine 0.62 (L) mg/dL      Sodium 137 mmol/L      Potassium 4.1 mmol/L      Chloride 100 mmol/L      CO2 26.4 mmol/L      Calcium 8.4 (L) mg/dL      eGFR Non African Amer 125 mL/min/1.73      BUN/Creatinine Ratio 12.9      Anion Gap 10.6 mmol/L     Narrative:       The MDRD GFR formula is only valid for adults with stable renal function between ages 18 and 70.    POC Glucose Fingerstick [[id number]]  (Normal) Collected:  01/26/17 0719    Specimen:  Blood Updated:  01/26/17 0747     Glucose 71 mg/dL     Narrative:       Meter: [id number] : [id number] Shukri Watkins    POC Glucose Fingerstick [[id number]]  (Normal) Collected:  01/26/17 0745    Specimen:  Blood Updated:  01/26/17 0751     Glucose 88 mg/dL     Narrative:       Meter: [id number] : [id number] Shukri Watkins        Imaging Results (most recent)     Procedure Component Value Units Date/Time    CT Abdomen Pelvis With Contrast [[id number]] Collected:  01/22/17 0810     Updated:  01/22/17 0817    Narrative:       Exam: CT of the abdomen and pelvis with IV contrast media.     HISTORY: Nursing home patient with elevated blood sugar distended  abdomen and urinary retention. Elevated white blood cell count and trace  microhematuria.     COMPARISON: 01/05/2017     TECHNIQUE: Axial imaging of the abdomen and pelvis was  performed with IV  contrast media.     FINDINGS: There is underlying emphysematous lung disease and chronic  bronchiectasis. There is a new infiltrate in the right lower lobe. There  are advanced atherosclerotic calcifications in the coronary arteries.  Small hiatal hernia is present. Liver gallbladder spleen and adrenal  glands are normal. Pancreas is unremarkable. The right and left kidney  are normal. There are dense atherosclerotic calcifications throughout  the aorta and iliac systems area Perry catheter is present in the  bladder. Subcutaneous soft tissue density and air is seen in the  anterior abdominal wall likely reflecting injections area Perry catheter  is in the bladder with prostate enlargement. There is a large fecal ball  within the rectal vault and there is colonic distention proximally  consistent with impaction small bowel is nondilated. No free air  intra-abdominally. There are advanced degenerative changes throughout  the lumbar spine. There is a T12 compression fracture with progression  since 01/05/2017.       Impression:       Fecal impaction with colonic distention.  Right lower lobe infiltrate consistent with pneumonia. Interval  progression of the patient's T12 compression fracture.     This report was finalized on 1/22/2017 8:14 AM by Dr. Carlos Alberto Hylton MD.       XR Chest 1 View [[id number]] Collected:  01/22/17 0834     Updated:  01/22/17 0837    Narrative:       Exam: AP portable chest.     HISTORY: Nonverbal nursing home patient with altered mental status and  low back pain for 4 days. Recent fall.     FINDINGS: An AP view of the chest is obtained. Cardiac size remains  normal. Chronic pulmonary parenchymal scarring right upper lobe. No  acute findings.       Impression:       Negative portable chest. No acute findings. Signed report     This report was finalized on 1/22/2017 8:35 AM by Dr. Carlos Alberto Hylton MD.       FL Video Swallow With Speech [[id number]] Collected:  01/24/17 1604     Updated:   01/24/17 1606    Narrative:       INDICATION: Recent pneumonia and aspiration.     FINDINGS: Fluoroscopic guidance for speech pathology swallow study. The  patient was given various substances different thicknesses. No evidence  of laryngeal penetration or aspiration. Please refer to the speech  pathology report for full details. Fluoroscopic time 2. 35 minutes. One  image acquired.     This report was finalized on 1/24/2017 4:03 PM by Dr. Mason Barrios MD.             PROCEDURES      Condition on Discharge:  Stable    Physical Exam at Discharge  Vital Signs  Temp:  [97.3 °F (36.3 °C)-98.6 °F (37 °C)] 98.6 °F (37 °C)  Heart Rate:  [82-91] 91  Resp:  [18-22] 20  BP: (121-132)/(68-80) 127/74  Body mass index is 22.26 kg/(m^2).  Pulse oximetry on room air is 99%.    Physical Exam:  Physical Exam   Constitutional: Patient appears well-developed and well-nourished and in no acute distress however he is very weak   HEENT:   Head: Normocephalic and atraumatic.   Eyes:  Pupils are equal, round, and reactive to light. EOM are intact. Sclera are anicteric and non-injected.  Mouth and Throat: Patient has moist mucous membranes. Oropharynx is clear of any erythema or exudate.     Neck: Neck supple. No JVD present. No thyromegaly present. No lymphadenopathy present.  Cardiovascular: Regular rate, regular rhythm, S1 normal and S2 normal.  Exam reveals no gallop and no friction rub.  No murmur heard.  Pulmonary/Chest: Lungs reveal mild scaattered rhonchi to auscultation bilaterally. No respiratory distress. No wheezes. No rhonchi. No rales.   Abdominal: Soft. Bowel sounds are normal. No distension and no mass. There is no hepatosplenomegaly. There is no tenderness.   Musculoskeletal: poor muscle tone  Extremities: No edema. Pulses are palpable in all 4 extremities.  Neurological: Patient is alert and oriented to person, place, and time. Cranial nerves II-XII are grossly intact with no focal deficits.  Skin: Skin is warm. No rash  noted. Nails show no clubbing.  No cyanosis or erythema.    Discharge Disposition  Back to [facility]    Visiting Nurse:    No     Home PT/OT:  No     Home Safety Evaluation:  No     DME  As per nursing homes needs assessment    Discharge Diet:           Dietary Orders            Start     Ordered    01/24/17 1200  Dietary Nutrition Supplement: Glucerna Shakes  Daily With Breakfast, Lunch & Dinner     Question:  Select Supplement:  Answer:  Glucerna Shakes    01/24/17 1101    01/22/17 1227  Diet Soft Texture; Ground; Nectar / Syrup Thick; Cardiac, Consistent Carbohydrate  Diet Effective Now     Question Answer Comment   Diet Texture / Consistency Soft Texture    Select Texture: Ground    Fluid Consistency [place] / Syrup Thick    Common Modifiers Cardiac    Common Modifiers Consistent Carbohydrate        01/22/17 1226          Activity at Discharge:  As tolerated.  Recommend PT and OT evaluation for deconditioning secondary to serious illness    Pre-discharge education  Medication review      Follow-up Appointments  Future Appointments  Date Time Provider Department Center   1/27/2017 9:00 AM MAURICE Oakley [facility] None     Referrals and Follow-ups to Schedule     Call MD With Problems / Concerns    As directed    Instructions:  Be sure Dr. Amos knows patient is returning to the nursing home.       Follow-Up    As directed    Dr. Amos   Follow Up Details:  To be admitted to Dr. Amos's service                 Test Results Pending at Discharge   Order Current Status    Blood Culture Preliminary result    Blood Culture Preliminary result           Vikki Pepper DO  01/26/17  9:38 AM    Time: 25 min (if over 30 minutes give explanation as to why it took greater than 30 minutes)

## 2017-01-26 NOTE — PLAN OF CARE
Problem: Patient Care Overview (Adult)  Goal: Plan of Care Review  Outcome: Ongoing (interventions implemented as appropriate)    01/26/17 0506   Coping/Psychosocial Response Interventions   Plan Of Care Reviewed With patient   Patient Care Overview   Progress improving       Goal: Adult Individualization and Mutuality  Outcome: Ongoing (interventions implemented as appropriate)    01/26/17 0506   Individualization   Patient Specific Preferences none voiced       Goal: Discharge Needs Assessment  Outcome: Ongoing (interventions implemented as appropriate)    01/26/17 0506   Discharge Needs Assessment   Concerns To Be Addressed denies needs/concerns at this time   Readmission Within The Last 30 Days no previous admission in last 30 days   Self-Care   Equipment Currently Used at Home wheelchair   Living Environment   Transportation Available none         Problem: Fall Risk (Adult)  Goal: Identify Related Risk Factors and Signs and Symptoms  Outcome: Ongoing (interventions implemented as appropriate)    01/26/17 0506   Fall Risk   Fall Risk: Related Risk Factors age-related changes;confusion/agitation;fatigue/slow reaction;gait/mobility problems   Fall Risk: Signs and Symptoms presence of risk factors       Goal: Absence of Falls  Outcome: Ongoing (interventions implemented as appropriate)    01/26/17 0506   Fall Risk (Adult)   Absence of Falls making progress toward outcome

## 2017-01-26 NOTE — NURSING NOTE
spoke with brother Sven via telephone in regards to patient being discharged today. spoke with patient at bedside and updated important message from medicare. spoke with Dr Pepper and patient is appropriate for ambulance to [facility]. spoke with Kristen [place]wood and she is aware of discharge today. will continue to follow

## 2017-01-26 NOTE — PLAN OF CARE
Problem: Patient Care Overview (Adult)  Goal: Plan of Care Review  Outcome: Ongoing (interventions implemented as appropriate)    01/25/17 2016   Coping/Psychosocial Response Interventions   Plan Of Care Reviewed With patient   Patient Care Overview   Progress improving       Goal: Adult Individualization and Mutuality  Outcome: Ongoing (interventions implemented as appropriate)    Problem: Pneumonia (Adult)  Intervention: Maximize Oxygenation/Ventilation/Perfusion    01/25/17 2016   Activity   Activity Type activity adjusted per tolerance   Promote Aggressive Pulmonary Hygiene/Secretion Management   Cough And Deep Breathing done with encouragement   Respiratory Interventions   Airway/Ventilation Management airway patency maintained;pulmonary hygiene promoted   Positioning   Head Of Bed (HOB) Position HOB elevated

## 2017-01-27 ENCOUNTER — APPOINTMENT (OUTPATIENT)
Dept: GENERAL RADIOLOGY | Facility: HOSPITAL | Age: 82
End: 2017-01-27

## 2017-01-27 ENCOUNTER — HOSPITAL ENCOUNTER (EMERGENCY)
Facility: HOSPITAL | Age: 82
Discharge: HOME | End: 2017-01-28
Attending: FAMILY MEDICINE | Admitting: FAMILY MEDICINE

## 2017-01-27 DIAGNOSIS — R33.9 URINARY RETENTION: ICD-10-CM

## 2017-01-27 DIAGNOSIS — R31.9 HEMATURIA: Primary | ICD-10-CM

## 2017-01-27 LAB
ALBUMIN SERPL-MCNC: 2.9 G/DL (ref 3.5–5.2)
ALBUMIN/GLOB SERPL: 0.7 G/DL
ALP SERPL-CCNC: 119 U/L (ref 39–117)
ALT SERPL W P-5'-P-CCNC: 21 U/L (ref 1–41)
ANION GAP SERPL CALCULATED.3IONS-SCNC: 8.5 MMOL/L
AST SERPL-CCNC: 19 U/L (ref 1–40)
BACTERIA UR QL AUTO: ABNORMAL /HPF
BASOPHILS # BLD AUTO: 0.02 10*3/MM3 (ref 0–0.2)
BASOPHILS NFR BLD AUTO: 0.3 % (ref 0–1.5)
BILIRUB SERPL-MCNC: 0.2 MG/DL (ref 0.1–1.2)
BILIRUB UR QL STRIP: NEGATIVE
BUN BLD-MCNC: 9 MG/DL (ref 8–23)
BUN/CREAT SERPL: 12.3 (ref 7–25)
CALCIUM SPEC-SCNC: 8.9 MG/DL (ref 8.6–10.5)
CHLORIDE SERPL-SCNC: 94 MMOL/L (ref 98–107)
CLARITY UR: CLEAR
CO2 SERPL-SCNC: 30.5 MMOL/L (ref 22–29)
COLOR UR: YELLOW
CREAT BLD-MCNC: 0.73 MG/DL (ref 0.76–1.27)
DEPRECATED RDW RBC AUTO: 44 FL (ref 37–54)
EOSINOPHIL # BLD AUTO: 0.24 10*3/MM3 (ref 0–0.7)
EOSINOPHIL NFR BLD AUTO: 3.4 % (ref 0.3–6.2)
ERYTHROCYTE [DISTWIDTH] IN BLOOD BY AUTOMATED COUNT: 13.4 % (ref 11.5–14.5)
GFR SERPL CREATININE-BSD FRML MDRD: 103 ML/MIN/1.73
GLOBULIN UR ELPH-MCNC: 4 GM/DL
GLUCOSE BLD-MCNC: 214 MG/DL (ref 65–99)
GLUCOSE UR STRIP-MCNC: ABNORMAL MG/DL
HCT VFR BLD AUTO: 40 % (ref 40.4–52.2)
HGB BLD-MCNC: 13 G/DL (ref 13.7–17.6)
HGB UR QL STRIP.AUTO: ABNORMAL
HYALINE CASTS UR QL AUTO: ABNORMAL /LPF
IMM GRANULOCYTES # BLD: 0.08 10*3/MM3 (ref 0–0.03)
IMM GRANULOCYTES NFR BLD: 1.1 % (ref 0–0.5)
KETONES UR QL STRIP: NEGATIVE
LEUKOCYTE ESTERASE UR QL STRIP.AUTO: NEGATIVE
LYMPHOCYTES # BLD AUTO: 1.22 10*3/MM3 (ref 0.9–4.8)
LYMPHOCYTES NFR BLD AUTO: 17.4 % (ref 19.6–45.3)
MCH RBC QN AUTO: 29.8 PG (ref 27–32.7)
MCHC RBC AUTO-ENTMCNC: 32.5 G/DL (ref 32.6–36.4)
MCV RBC AUTO: 91.7 FL (ref 79.8–96.2)
MONOCYTES # BLD AUTO: 0.66 10*3/MM3 (ref 0.2–1.2)
MONOCYTES NFR BLD AUTO: 9.4 % (ref 5–12)
NEUTROPHILS # BLD AUTO: 4.78 10*3/MM3 (ref 1.9–8.1)
NEUTROPHILS NFR BLD AUTO: 68.4 % (ref 42.7–76)
NITRITE UR QL STRIP: NEGATIVE
PH UR STRIP.AUTO: 6.5 [PH] (ref 5–8)
PLATELET # BLD AUTO: 225 10*3/MM3 (ref 140–500)
PMV BLD AUTO: 11.3 FL (ref 6–12)
POTASSIUM BLD-SCNC: 4.3 MMOL/L (ref 3.5–5.2)
PROT SERPL-MCNC: 6.9 G/DL (ref 6–8.5)
PROT UR QL STRIP: NEGATIVE
RBC # BLD AUTO: 4.36 10*6/MM3 (ref 4.6–6)
RBC # UR: ABNORMAL /HPF
REF LAB TEST METHOD: ABNORMAL
SODIUM BLD-SCNC: 133 MMOL/L (ref 136–145)
SP GR UR STRIP: 1.01 (ref 1–1.03)
SQUAMOUS #/AREA URNS HPF: ABNORMAL /HPF
UROBILINOGEN UR QL STRIP: ABNORMAL
WBC NRBC COR # BLD: 7 10*3/MM3 (ref 4.5–10.7)
WBC UR QL AUTO: ABNORMAL /HPF

## 2017-01-27 PROCEDURE — 81001 URINALYSIS AUTO W/SCOPE: CPT | Performed by: FAMILY MEDICINE

## 2017-01-27 PROCEDURE — 99284 EMERGENCY DEPT VISIT MOD MDM: CPT

## 2017-01-27 PROCEDURE — 73560 X-RAY EXAM OF KNEE 1 OR 2: CPT

## 2017-01-27 PROCEDURE — 51702 INSERT TEMP BLADDER CATH: CPT

## 2017-01-27 PROCEDURE — 85025 COMPLETE CBC W/AUTO DIFF WBC: CPT | Performed by: FAMILY MEDICINE

## 2017-01-27 PROCEDURE — 80053 COMPREHEN METABOLIC PANEL: CPT | Performed by: FAMILY MEDICINE

## 2017-01-27 RX ORDER — LACTOBACILLUS ACIDOPHILUS / LACTOBACILLUS BULGARICUS 100 MILLION CFU STRENGTH
1 GRANULES ORAL 2 TIMES DAILY
COMMUNITY

## 2017-01-28 VITALS
SYSTOLIC BLOOD PRESSURE: 136 MMHG | BODY MASS INDEX: 23.7 KG/M2 | HEART RATE: 108 BPM | DIASTOLIC BLOOD PRESSURE: 62 MMHG | WEIGHT: 175 LBS | RESPIRATION RATE: 18 BRPM | HEIGHT: 72 IN | OXYGEN SATURATION: 96 % | TEMPERATURE: 96.7 F

## 2017-02-07 ENCOUNTER — HOSPITAL ENCOUNTER (INPATIENT)
Facility: HOSPITAL | Age: 82
LOS: 3 days | Discharge: NURSING, REHAB OR LONG-TERM CARE | End: 2017-02-10
Attending: EMERGENCY MEDICINE | Admitting: HOSPITALIST

## 2017-02-07 ENCOUNTER — APPOINTMENT (OUTPATIENT)
Dept: GENERAL RADIOLOGY | Facility: HOSPITAL | Age: 82
End: 2017-02-07

## 2017-02-07 DIAGNOSIS — G93.41 ACUTE METABOLIC ENCEPHALOPATHY: ICD-10-CM

## 2017-02-07 DIAGNOSIS — J96.01 ACUTE RESPIRATORY FAILURE WITH HYPOXIA (HCC): Primary | ICD-10-CM

## 2017-02-07 DIAGNOSIS — E11.9 DIABETES MELLITUS TYPE 2 IN NONOBESE (HCC): ICD-10-CM

## 2017-02-07 DIAGNOSIS — E86.9 VOLUME DEPLETION: ICD-10-CM

## 2017-02-07 DIAGNOSIS — R77.8 TROPONIN I ABOVE REFERENCE RANGE: ICD-10-CM

## 2017-02-07 DIAGNOSIS — J18.9 PNEUMONIA DUE TO INFECTIOUS ORGANISM, UNSPECIFIED LATERALITY, UNSPECIFIED PART OF LUNG: ICD-10-CM

## 2017-02-07 DIAGNOSIS — D72.829 LEUKOCYTOSIS, UNSPECIFIED TYPE: ICD-10-CM

## 2017-02-07 DIAGNOSIS — N17.9 ACUTE KIDNEY INJURY (HCC): ICD-10-CM

## 2017-02-07 LAB
ALBUMIN SERPL-MCNC: 3.6 G/DL (ref 3.5–5.2)
ALBUMIN/GLOB SERPL: 0.7 G/DL
ALP SERPL-CCNC: 122 U/L (ref 40–129)
ALT SERPL W P-5'-P-CCNC: 13 U/L (ref 5–41)
ANION GAP SERPL CALCULATED.3IONS-SCNC: 14.2 MMOL/L
APTT PPP: 33.7 SECONDS (ref 24.3–38.1)
AST SERPL-CCNC: 19 U/L (ref 5–40)
B PERT DNA SPEC QL NAA+PROBE: NOT DETECTED
BACTERIA UR QL AUTO: ABNORMAL /HPF
BASOPHILS # BLD AUTO: 0.05 10*3/MM3 (ref 0–0.2)
BASOPHILS NFR BLD AUTO: 0.3 % (ref 0–2)
BILIRUB SERPL-MCNC: 0.7 MG/DL (ref 0.2–1.2)
BILIRUB UR QL STRIP: NEGATIVE
BUN BLD-MCNC: 33 MG/DL (ref 8–23)
BUN/CREAT SERPL: 20.9 (ref 7–25)
C PNEUM DNA NPH QL NAA+NON-PROBE: NOT DETECTED
CALCIUM SPEC-SCNC: 9.5 MG/DL (ref 8.8–10.5)
CHLORIDE SERPL-SCNC: 107 MMOL/L (ref 98–107)
CLARITY UR: CLEAR
CO2 SERPL-SCNC: 32.8 MMOL/L (ref 22–29)
COLOR UR: YELLOW
CREAT BLD-MCNC: 1.58 MG/DL (ref 0.76–1.27)
D-LACTATE SERPL-SCNC: 1.8 MMOL/L (ref 0.5–2)
DEPRECATED RDW RBC AUTO: 46.7 FL (ref 37–54)
EOSINOPHIL # BLD AUTO: 0.06 10*3/MM3 (ref 0.1–0.3)
EOSINOPHIL NFR BLD AUTO: 0.4 % (ref 0–4)
ERYTHROCYTE [DISTWIDTH] IN BLOOD BY AUTOMATED COUNT: 14 % (ref 11.5–14.5)
FLUAV H1 2009 PAND RNA NPH QL NAA+PROBE: NOT DETECTED
FLUAV H1 HA GENE NPH QL NAA+PROBE: NOT DETECTED
FLUAV H3 RNA NPH QL NAA+PROBE: NOT DETECTED
FLUAV SUBTYP SPEC NAA+PROBE: NOT DETECTED
FLUBV RNA ISLT QL NAA+PROBE: NOT DETECTED
GFR SERPL CREATININE-BSD FRML MDRD: 42 ML/MIN/1.73
GLOBULIN UR ELPH-MCNC: 4.9 GM/DL
GLUCOSE BLD-MCNC: 249 MG/DL (ref 65–99)
GLUCOSE BLDC GLUCOMTR-MCNC: 159 MG/DL (ref 70–130)
GLUCOSE BLDC GLUCOMTR-MCNC: 286 MG/DL (ref 70–130)
GLUCOSE UR STRIP-MCNC: NEGATIVE MG/DL
HADV DNA SPEC NAA+PROBE: NOT DETECTED
HBA1C MFR BLD: 9.5 % (ref 4.8–5.6)
HCOV 229E RNA SPEC QL NAA+PROBE: NOT DETECTED
HCOV HKU1 RNA SPEC QL NAA+PROBE: NOT DETECTED
HCOV NL63 RNA SPEC QL NAA+PROBE: NOT DETECTED
HCOV OC43 RNA SPEC QL NAA+PROBE: NOT DETECTED
HCT VFR BLD AUTO: 50.9 % (ref 42–52)
HGB BLD-MCNC: 15.8 G/DL (ref 14–18)
HGB UR QL STRIP.AUTO: ABNORMAL
HMPV RNA NPH QL NAA+NON-PROBE: NOT DETECTED
HPIV1 RNA SPEC QL NAA+PROBE: NOT DETECTED
HPIV2 RNA SPEC QL NAA+PROBE: NOT DETECTED
HPIV3 RNA NPH QL NAA+PROBE: NOT DETECTED
HPIV4 P GENE NPH QL NAA+PROBE: NOT DETECTED
HYALINE CASTS UR QL AUTO: ABNORMAL /LPF
IMM GRANULOCYTES # BLD: 0.06 10*3/MM3 (ref 0–0.03)
IMM GRANULOCYTES NFR BLD: 0.4 % (ref 0–0.5)
INR PPP: 1.19 (ref 0.9–1.1)
KETONES UR QL STRIP: NEGATIVE
LEUKOCYTE ESTERASE UR QL STRIP.AUTO: NEGATIVE
LYMPHOCYTES # BLD AUTO: 1.95 10*3/MM3 (ref 0.6–4.8)
LYMPHOCYTES NFR BLD AUTO: 13.3 % (ref 20–45)
M PNEUMO IGG SER IA-ACNC: NOT DETECTED
MCH RBC QN AUTO: 28.5 PG (ref 27–31)
MCHC RBC AUTO-ENTMCNC: 31 G/DL (ref 31–37)
MCV RBC AUTO: 91.9 FL (ref 80–94)
MONOCYTES # BLD AUTO: 0.92 10*3/MM3 (ref 0–1)
MONOCYTES NFR BLD AUTO: 6.3 % (ref 3–8)
NEUTROPHILS # BLD AUTO: 11.59 10*3/MM3 (ref 1.5–8.3)
NEUTROPHILS NFR BLD AUTO: 79.3 % (ref 45–70)
NITRITE UR QL STRIP: NEGATIVE
NRBC BLD MANUAL-RTO: 0 /100 WBC (ref 0–0)
NT-PROBNP SERPL-MCNC: 315.7 PG/ML (ref 5–450)
PH UR STRIP.AUTO: 6 [PH] (ref 4.5–8)
PLATELET # BLD AUTO: 286 10*3/MM3 (ref 140–500)
PMV BLD AUTO: 10.6 FL (ref 7.4–10.4)
POTASSIUM BLD-SCNC: 4 MMOL/L (ref 3.5–5.2)
PROCALCITONIN SERPL-MCNC: 0.11 NG/ML (ref 0.1–0.25)
PROT SERPL-MCNC: 8.5 G/DL (ref 6–8.5)
PROT UR QL STRIP: ABNORMAL
PROTHROMBIN TIME: 15.2 SECONDS (ref 12.1–15)
RBC # BLD AUTO: 5.54 10*6/MM3 (ref 4.7–6.1)
RBC # UR: ABNORMAL /HPF
REF LAB TEST METHOD: ABNORMAL
RHINOVIRUS RNA SPEC NAA+PROBE: NOT DETECTED
RSV RNA NPH QL NAA+NON-PROBE: NOT DETECTED
SODIUM BLD-SCNC: 154 MMOL/L (ref 136–145)
SP GR UR STRIP: 1.01 (ref 1–1.03)
SQUAMOUS #/AREA URNS HPF: ABNORMAL /HPF
TROPONIN T SERPL-MCNC: 0.04 NG/ML (ref 0–0.03)
TSH SERPL DL<=0.05 MIU/L-ACNC: 3.39 MIU/ML (ref 0.27–4.2)
UROBILINOGEN UR QL STRIP: ABNORMAL
WBC NRBC COR # BLD: 14.63 10*3/MM3 (ref 4.8–10.8)
WBC UR QL AUTO: ABNORMAL /HPF
YEAST URNS QL MICRO: ABNORMAL /HPF

## 2017-02-07 PROCEDURE — 71020 HC CHEST PA AND LATERAL: CPT

## 2017-02-07 PROCEDURE — 87486 CHLMYD PNEUM DNA AMP PROBE: CPT | Performed by: NURSE PRACTITIONER

## 2017-02-07 PROCEDURE — 82962 GLUCOSE BLOOD TEST: CPT

## 2017-02-07 PROCEDURE — 96360 HYDRATION IV INFUSION INIT: CPT

## 2017-02-07 PROCEDURE — 99285 EMERGENCY DEPT VISIT HI MDM: CPT

## 2017-02-07 PROCEDURE — 87798 DETECT AGENT NOS DNA AMP: CPT | Performed by: NURSE PRACTITIONER

## 2017-02-07 PROCEDURE — 87147 CULTURE TYPE IMMUNOLOGIC: CPT | Performed by: NURSE PRACTITIONER

## 2017-02-07 PROCEDURE — 63710000001 INSULIN ASPART PER 5 UNITS: Performed by: NURSE PRACTITIONER

## 2017-02-07 PROCEDURE — 93005 ELECTROCARDIOGRAM TRACING: CPT

## 2017-02-07 PROCEDURE — 99291 CRITICAL CARE FIRST HOUR: CPT | Performed by: EMERGENCY MEDICINE

## 2017-02-07 PROCEDURE — 94799 UNLISTED PULMONARY SVC/PX: CPT

## 2017-02-07 PROCEDURE — 84443 ASSAY THYROID STIM HORMONE: CPT | Performed by: NURSE PRACTITIONER

## 2017-02-07 PROCEDURE — 94640 AIRWAY INHALATION TREATMENT: CPT

## 2017-02-07 PROCEDURE — 85025 COMPLETE CBC W/AUTO DIFF WBC: CPT | Performed by: EMERGENCY MEDICINE

## 2017-02-07 PROCEDURE — 93010 ELECTROCARDIOGRAM REPORT: CPT | Performed by: INTERNAL MEDICINE

## 2017-02-07 PROCEDURE — 85610 PROTHROMBIN TIME: CPT | Performed by: EMERGENCY MEDICINE

## 2017-02-07 PROCEDURE — 87040 BLOOD CULTURE FOR BACTERIA: CPT | Performed by: EMERGENCY MEDICINE

## 2017-02-07 PROCEDURE — 84484 ASSAY OF TROPONIN QUANT: CPT | Performed by: EMERGENCY MEDICINE

## 2017-02-07 PROCEDURE — 87106 FUNGI IDENTIFICATION YEAST: CPT | Performed by: EMERGENCY MEDICINE

## 2017-02-07 PROCEDURE — 83036 HEMOGLOBIN GLYCOSYLATED A1C: CPT | Performed by: NURSE PRACTITIONER

## 2017-02-07 PROCEDURE — 87633 RESP VIRUS 12-25 TARGETS: CPT | Performed by: NURSE PRACTITIONER

## 2017-02-07 PROCEDURE — 84145 PROCALCITONIN (PCT): CPT | Performed by: NURSE PRACTITIONER

## 2017-02-07 PROCEDURE — 63710000001 INSULIN DETEMIR PER 5 UNITS: Performed by: NURSE PRACTITIONER

## 2017-02-07 PROCEDURE — 25010000002 ENOXAPARIN PER 10 MG: Performed by: NURSE PRACTITIONER

## 2017-02-07 PROCEDURE — 87581 M.PNEUMON DNA AMP PROBE: CPT | Performed by: NURSE PRACTITIONER

## 2017-02-07 PROCEDURE — 81001 URINALYSIS AUTO W/SCOPE: CPT | Performed by: EMERGENCY MEDICINE

## 2017-02-07 PROCEDURE — 80053 COMPREHEN METABOLIC PANEL: CPT | Performed by: EMERGENCY MEDICINE

## 2017-02-07 PROCEDURE — 87086 URINE CULTURE/COLONY COUNT: CPT | Performed by: EMERGENCY MEDICINE

## 2017-02-07 PROCEDURE — 99222 1ST HOSP IP/OBS MODERATE 55: CPT | Performed by: NURSE PRACTITIONER

## 2017-02-07 PROCEDURE — 25010000002 LEVOFLOXACIN PER 250 MG: Performed by: EMERGENCY MEDICINE

## 2017-02-07 PROCEDURE — 83880 ASSAY OF NATRIURETIC PEPTIDE: CPT | Performed by: EMERGENCY MEDICINE

## 2017-02-07 PROCEDURE — 87081 CULTURE SCREEN ONLY: CPT | Performed by: NURSE PRACTITIONER

## 2017-02-07 PROCEDURE — 85730 THROMBOPLASTIN TIME PARTIAL: CPT | Performed by: EMERGENCY MEDICINE

## 2017-02-07 PROCEDURE — 83605 ASSAY OF LACTIC ACID: CPT | Performed by: EMERGENCY MEDICINE

## 2017-02-07 RX ORDER — CEFTRIAXONE 1 G/1
1 INJECTION, POWDER, FOR SOLUTION INTRAMUSCULAR; INTRAVENOUS ONCE
COMMUNITY
End: 2017-02-10 | Stop reason: HOSPADM

## 2017-02-07 RX ORDER — GUAIFENESIN 600 MG/1
600 TABLET, EXTENDED RELEASE ORAL 2 TIMES DAILY
COMMUNITY

## 2017-02-07 RX ORDER — L.ACID,PARA/B.BIFIDUM/S.THERM 8B CELL
1 CAPSULE ORAL 2 TIMES DAILY
Status: DISCONTINUED | OUTPATIENT
Start: 2017-02-07 | End: 2017-02-10 | Stop reason: HOSPADM

## 2017-02-07 RX ORDER — SODIUM CHLORIDE 9 MG/ML
125 INJECTION, SOLUTION INTRAVENOUS CONTINUOUS
Status: DISCONTINUED | OUTPATIENT
Start: 2017-02-07 | End: 2017-02-07

## 2017-02-07 RX ORDER — SODIUM CHLORIDE 9 MG/ML
INJECTION, SOLUTION INTRAVENOUS
Status: DISPENSED
Start: 2017-02-07 | End: 2017-02-08

## 2017-02-07 RX ORDER — ZINC OXIDE 20 %
OINTMENT (GRAM) TOPICAL 3 TIMES DAILY
COMMUNITY

## 2017-02-07 RX ORDER — FAMOTIDINE 20 MG/1
20 TABLET, FILM COATED ORAL 2 TIMES DAILY
Status: DISCONTINUED | OUTPATIENT
Start: 2017-02-07 | End: 2017-02-10 | Stop reason: HOSPADM

## 2017-02-07 RX ORDER — LEVOFLOXACIN 5 MG/ML
750 INJECTION, SOLUTION INTRAVENOUS ONCE
Status: COMPLETED | OUTPATIENT
Start: 2017-02-07 | End: 2017-02-07

## 2017-02-07 RX ORDER — LACTULOSE 10 G/15ML
20 SOLUTION ORAL DAILY PRN
COMMUNITY

## 2017-02-07 RX ORDER — SODIUM CHLORIDE, SODIUM LACTATE, POTASSIUM CHLORIDE, CALCIUM CHLORIDE 600; 310; 30; 20 MG/100ML; MG/100ML; MG/100ML; MG/100ML
100 INJECTION, SOLUTION INTRAVENOUS CONTINUOUS
Status: DISCONTINUED | OUTPATIENT
Start: 2017-02-07 | End: 2017-02-10 | Stop reason: HOSPADM

## 2017-02-07 RX ORDER — SODIUM CHLORIDE 0.9 % (FLUSH) 0.9 %
10 SYRINGE (ML) INJECTION AS NEEDED
Status: DISCONTINUED | OUTPATIENT
Start: 2017-02-07 | End: 2017-02-10 | Stop reason: HOSPADM

## 2017-02-07 RX ORDER — DEXTROSE MONOHYDRATE 25 G/50ML
25 INJECTION, SOLUTION INTRAVENOUS
Status: DISCONTINUED | OUTPATIENT
Start: 2017-02-07 | End: 2017-02-10 | Stop reason: HOSPADM

## 2017-02-07 RX ORDER — BISACODYL 10 MG
10 SUPPOSITORY, RECTAL RECTAL DAILY PRN
COMMUNITY

## 2017-02-07 RX ORDER — ACETAMINOPHEN 500 MG
500 TABLET ORAL EVERY 4 HOURS PRN
COMMUNITY

## 2017-02-07 RX ORDER — BUDESONIDE 0.5 MG/2ML
0.5 INHALANT ORAL 2 TIMES DAILY
Status: DISCONTINUED | OUTPATIENT
Start: 2017-02-07 | End: 2017-02-07

## 2017-02-07 RX ORDER — SODIUM PHOSPHATE, DIBASIC AND SODIUM PHOSPHATE, MONOBASIC 7; 19 G/133ML; G/133ML
ENEMA RECTAL DAILY PRN
COMMUNITY

## 2017-02-07 RX ORDER — ACETAMINOPHEN 500 MG
500 TABLET ORAL EVERY 4 HOURS PRN
Status: DISCONTINUED | OUTPATIENT
Start: 2017-02-07 | End: 2017-02-10 | Stop reason: HOSPADM

## 2017-02-07 RX ORDER — NICOTINE POLACRILEX 4 MG
15 LOZENGE BUCCAL
Status: DISCONTINUED | OUTPATIENT
Start: 2017-02-07 | End: 2017-02-10 | Stop reason: HOSPADM

## 2017-02-07 RX ORDER — GUAIFENESIN 600 MG/1
1200 TABLET, EXTENDED RELEASE ORAL 2 TIMES DAILY
Status: DISCONTINUED | OUTPATIENT
Start: 2017-02-07 | End: 2017-02-10 | Stop reason: HOSPADM

## 2017-02-07 RX ORDER — IPRATROPIUM BROMIDE AND ALBUTEROL SULFATE 2.5; .5 MG/3ML; MG/3ML
3 SOLUTION RESPIRATORY (INHALATION)
Status: DISCONTINUED | OUTPATIENT
Start: 2017-02-07 | End: 2017-02-08

## 2017-02-07 RX ORDER — NYSTATIN 10B UNIT
POWDER (EA) MISCELLANEOUS 2 TIMES DAILY PRN
COMMUNITY

## 2017-02-07 RX ORDER — BUDESONIDE 0.5 MG/2ML
0.5 INHALANT ORAL
Status: DISCONTINUED | OUTPATIENT
Start: 2017-02-07 | End: 2017-02-10 | Stop reason: HOSPADM

## 2017-02-07 RX ADMIN — IPRATROPIUM BROMIDE AND ALBUTEROL SULFATE 3 ML: .5; 3 SOLUTION RESPIRATORY (INHALATION) at 15:47

## 2017-02-07 RX ADMIN — IPRATROPIUM BROMIDE AND ALBUTEROL SULFATE 3 ML: .5; 3 SOLUTION RESPIRATORY (INHALATION) at 20:20

## 2017-02-07 RX ADMIN — ENOXAPARIN SODIUM 40 MG: 40 INJECTION SUBCUTANEOUS at 18:19

## 2017-02-07 RX ADMIN — BUDESONIDE 0.5 MG: 0.5 SUSPENSION RESPIRATORY (INHALATION) at 20:20

## 2017-02-07 RX ADMIN — SODIUM CHLORIDE 125 ML/HR: 9 INJECTION, SOLUTION INTRAVENOUS at 11:40

## 2017-02-07 RX ADMIN — GUAIFENESIN 1200 MG: 600 TABLET, EXTENDED RELEASE ORAL at 18:09

## 2017-02-07 RX ADMIN — SODIUM CHLORIDE, POTASSIUM CHLORIDE, SODIUM LACTATE AND CALCIUM CHLORIDE 100 ML/HR: 600; 310; 30; 20 INJECTION, SOLUTION INTRAVENOUS at 16:18

## 2017-02-07 RX ADMIN — LEVOFLOXACIN 750 MG: 5 INJECTION, SOLUTION INTRAVENOUS at 12:55

## 2017-02-07 RX ADMIN — IPRATROPIUM BROMIDE AND ALBUTEROL SULFATE 3 ML: .5; 3 SOLUTION RESPIRATORY (INHALATION) at 23:35

## 2017-02-07 RX ADMIN — Medication 1 CAPSULE: at 18:09

## 2017-02-07 RX ADMIN — INSULIN ASPART 4 UNITS: 100 INJECTION, SOLUTION INTRAVENOUS; SUBCUTANEOUS at 22:10

## 2017-02-07 RX ADMIN — INSULIN ASPART 2 UNITS: 100 INJECTION, SOLUTION INTRAVENOUS; SUBCUTANEOUS at 18:09

## 2017-02-07 RX ADMIN — FAMOTIDINE 20 MG: 20 TABLET, FILM COATED ORAL at 18:09

## 2017-02-07 RX ADMIN — SODIUM CHLORIDE, POTASSIUM CHLORIDE, SODIUM LACTATE AND CALCIUM CHLORIDE 250 ML: 600; 310; 30; 20 INJECTION, SOLUTION INTRAVENOUS at 16:42

## 2017-02-07 RX ADMIN — INSULIN DETEMIR 30 UNITS: 100 INJECTION, SOLUTION SUBCUTANEOUS at 22:11

## 2017-02-07 NOTE — ED PROVIDER NOTES
Subjective     History provided by:  Medical records and patient  History limited by: Vague historian.    History of Present Illness    Chief complaint: Patient denies complaint other than some soreness in his knee on the right.  No known trauma.  Patient sent to the ED by nursing facility reported fever this morning, rattle with breathing, decreased blood pressure    Narrative: 81-year-old nursing home resident presents by EMS for evaluation of fever, rattle with breathing, decreased blood pressure all prior to arrival at the nursing home this morning.  Patient denies acute complaint at this time.  He does note that his right knee has been hurting him for several weeks with a history of arthritis.    Review of Systems   All other systems reviewed and are negative.      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 No pertinent past surgical history.    Family History   Problem Relation Age of Onset   • Family history unknown: Yes       Social History     Social History   • Marital status:      Spouse name: N/A   • Number of children: 0   • Years of education: HIGH SCHOOL     Occupational History   • RETIRED      Social History Main  Topics   • Smoking status: Former Smoker     Packs/day: 1.00     Years: 39.00     Types: Cigarettes     Quit date: 1992   • Smokeless tobacco: None   • Alcohol use No   • Drug use: No   • Sexual activity: Defer     Other Topics Concern   • None     Social History Narrative     ED Triage Vitals   Temp Heart Rate Resp BP SpO2   02/07/17 1045 02/07/17 1023 02/07/17 1023 02/07/17 1023 02/07/17 1023   97.9 °F (36.6 °C) 138 28 101/78 92 %      Temp src Heart Rate Source Patient Position BP Location FiO2 (%)   02/07/17 1023 -- -- -- --   Oral                 Objective   Physical Exam   Constitutional:   Elderly male who is in a deconditioned state but in no acute distress in the emergency department has a very wet sounding cough.   HENT:   Head: Normocephalic and atraumatic.   Oral mucosa is mildly dry with white substance consistent with last meal caked in his mouth   Eyes: Conjunctivae are normal. No scleral icterus.   Neck: Neck supple.   Cardiovascular: Regular rhythm and intact distal pulses.    Tachycardic   Pulmonary/Chest:   Diminished breath sounds bilateral bases with a few rhonchi on the right but significant coarse breath sounds on the left base   Abdominal: Soft. He exhibits no distension. There is no tenderness.   Musculoskeletal: He exhibits no edema or tenderness.   Moves all extremities equally.   Neurological: He is alert.   Identifies himself and his brother who is at bedside.   Psychiatric: He has a normal mood and affect.   Vitals reviewed.      Procedures    EKG           EKG time / Interpretation time: 1039 / 1048  Rhythm/Rate: Sinus tach, 120-130  P waves and LA: PO wnl  QRS, axis: narrow QRS   ST and T waves: no STEMI, QTc wnls     Interpreted Contemporaneously by me, independently viewed    Xr Chest 2 View    Result Date: 2/7/2017  Narrative: CHEST X-RAY, 02/07/2017:  HISTORY: 81-year-old male in the ED with 2-3 day history of shortness of air, cough and fever.  TECHNIQUE: AP and lateral upright  chest series.  FINDINGS: The lungs are expanded and clear. Heart size and pulmonary vascularity are normal. No visible pulmonary infiltrate or pleural effusion. No change since 01/21/2017. Advanced chronic arthropathy both shoulders.      Impression: No active disease in the chest. No change since 01/21/2017.  This report was finalized on 2/7/2017 12:08 PM by Dr. Josesito Michel MD.      Xr Knee 1 Or 2 View Right    Result Date: 1/27/2017  Narrative: 2 RADIOGRAPHIC VIEWS OF THE RIGHT KNEE  CLINICAL HISTORY:  Right knee pain.  2 radiographic views of the right knee demonstrate severe tricompartmental osteoarthritic change. There is joint space narrowing, subchondral sclerosis, and marginal osteophyte formation. There are findings consistent with a joint effusion with added density in the region of the suprapatellar bursa. Otherwise, no acute abnormality is appreciated. Atherosclerotic changes are incidentally noted within the arterial vasculature.  This report was finalized on 1/27/2017 7:20 PM by Dr. Reji Soto MD.      Ct Abdomen Pelvis With Contrast    Result Date: 1/22/2017  Narrative: Exam: CT of the abdomen and pelvis with IV contrast media.  HISTORY: Nursing home patient with elevated blood sugar distended abdomen and urinary retention. Elevated white blood cell count and trace microhematuria.  COMPARISON: 01/05/2017  TECHNIQUE: Axial imaging of the abdomen and pelvis was performed with IV contrast media.  FINDINGS: There is underlying emphysematous lung disease and chronic bronchiectasis. There is a new infiltrate in the right lower lobe. There are advanced atherosclerotic calcifications in the coronary arteries. Small hiatal hernia is present. Liver gallbladder spleen and adrenal glands are normal. Pancreas is unremarkable. The right and left kidney are normal. There are dense atherosclerotic calcifications throughout the aorta and iliac systems area Perry catheter is present in the bladder.  Subcutaneous soft tissue density and air is seen in the anterior abdominal wall likely reflecting injections area Perry catheter is in the bladder with prostate enlargement. There is a large fecal ball within the rectal vault and there is colonic distention proximally consistent with impaction small bowel is nondilated. No free air intra-abdominally. There are advanced degenerative changes throughout the lumbar spine. There is a T12 compression fracture with progression since 01/05/2017.      Impression: Fecal impaction with colonic distention. Right lower lobe infiltrate consistent with pneumonia. Interval progression of the patient's T12 compression fracture.  This report was finalized on 1/22/2017 8:14 AM by Dr. Carlos Alberto Hylton MD.      Fl Video Swallow With Speech    Result Date: 1/24/2017  Narrative: INDICATION: Recent pneumonia and aspiration.  FINDINGS: Fluoroscopic guidance for speech pathology swallow study. The patient was given various substances different thicknesses. No evidence of laryngeal penetration or aspiration. Please refer to the speech pathology report for full details. Fluoroscopic time 2. 35 minutes. One image acquired.  This report was finalized on 1/24/2017 4:03 PM by Dr. Mason Barrios MD.      Xr Chest 1 View    Result Date: 1/22/2017  Narrative: Exam: AP portable chest.  HISTORY: Nonverbal nursing home patient with altered mental status and low back pain for 4 days. Recent fall.  FINDINGS: An AP view of the chest is obtained. Cardiac size remains normal. Chronic pulmonary parenchymal scarring right upper lobe. No acute findings.      Impression: Negative portable chest. No acute findings. Signed report  This report was finalized on 1/22/2017 8:35 AM by Dr. Carlos Alberto Hylton MD.      Results for orders placed or performed during the hospital encounter of 02/07/17   Comprehensive Metabolic Panel   Result Value Ref Range    Glucose 249 (H) 65 - 99 mg/dL    BUN 33 (H) 8 - 23 mg/dL    Creatinine 1.58  (H) 0.76 - 1.27 mg/dL    Sodium 154 (H) 136 - 145 mmol/L    Potassium 4.0 3.5 - 5.2 mmol/L    Chloride 107 98 - 107 mmol/L    CO2 32.8 (H) 22.0 - 29.0 mmol/L    Calcium 9.5 8.8 - 10.5 mg/dL    Total Protein 8.5 6.0 - 8.5 g/dL    Albumin 3.60 3.50 - 5.20 g/dL    ALT (SGPT) 13 5 - 41 U/L    AST (SGOT) 19 5 - 40 U/L    Alkaline Phosphatase 122 40 - 129 U/L    Total Bilirubin 0.7 0.2 - 1.2 mg/dL    eGFR Non African Amer 42 (L) >60 mL/min/1.73    Globulin 4.9 gm/dL    A/G Ratio 0.7 g/dL    BUN/Creatinine Ratio 20.9 7.0 - 25.0    Anion Gap 14.2 mmol/L   Lactic Acid, Plasma   Result Value Ref Range    Lactate 1.8 0.5 - 2.0 mmol/L   CBC Auto Differential   Result Value Ref Range    WBC 14.63 (H) 4.80 - 10.80 10*3/mm3    RBC 5.54 4.70 - 6.10 10*6/mm3    Hemoglobin 15.8 14.0 - 18.0 g/dL    Hematocrit 50.9 42.0 - 52.0 %    MCV 91.9 80.0 - 94.0 fL    MCH 28.5 27.0 - 31.0 pg    MCHC 31.0 31.0 - 37.0 g/dL    RDW 14.0 11.5 - 14.5 %    RDW-SD 46.7 37.0 - 54.0 fl    MPV 10.6 (H) 7.4 - 10.4 fL    Platelets 286 140 - 500 10*3/mm3    Neutrophil % 79.3 (H) 45.0 - 70.0 %    Lymphocyte % 13.3 (L) 20.0 - 45.0 %    Monocyte % 6.3 3.0 - 8.0 %    Eosinophil % 0.4 0.0 - 4.0 %    Basophil % 0.3 0.0 - 2.0 %    Immature Grans % 0.4 0.0 - 0.5 %    Neutrophils, Absolute 11.59 (H) 1.50 - 8.30 10*3/mm3    Lymphocytes, Absolute 1.95 0.60 - 4.80 10*3/mm3    Monocytes, Absolute 0.92 0.00 - 1.00 10*3/mm3    Eosinophils, Absolute 0.06 (L) 0.10 - 0.30 10*3/mm3    Basophils, Absolute 0.05 0.00 - 0.20 10*3/mm3    Immature Grans, Absolute 0.06 (H) 0.00 - 0.03 10*3/mm3    nRBC 0.0 0.0 - 0.0 /100 WBC   Urinalysis With / Culture If Indicated   Result Value Ref Range    Color, UA Yellow Yellow, Straw    Appearance, UA Clear Clear    pH, UA 6.0 4.5 - 8.0    Specific Gravity, UA 1.015 1.003 - 1.030    Glucose, UA Negative Negative    Ketones, UA Negative Negative, 80 mg/dL (3+), >=160 mg/dL (4+)    Bilirubin, UA Negative Negative    Blood, UA Moderate (2+) (A)  Negative    Protein, UA 30 mg/dL (1+) (A) Negative    Leuk Esterase, UA Negative Negative    Nitrite, UA Negative Negative    Urobilinogen, UA 0.2 E.U./dL 0.2 - 1.0 E.U./dL   Urinalysis, Microscopic Only   Result Value Ref Range    RBC, UA None Seen None Seen /HPF    WBC, UA 0-2 (A) None Seen /HPF    Bacteria, UA Trace (A) None Seen /HPF    Squamous Epithelial Cells, UA 0-2 None Seen, 0-2 /HPF    Yeast, UA Moderate/2+ Budding Yeast w/Hyphae None Seen /HPF    Hyaline Casts, UA None Seen None Seen /LPF    Methodology Manual Light Microscopy    Troponin   Result Value Ref Range    Troponin T 0.036 (H) 0.000 - 0.030 ng/mL   Protime-INR   Result Value Ref Range    Protime 15.2 (H) 12.1 - 15.0 Seconds    INR 1.19 (H) 0.90 - 1.10   aPTT   Result Value Ref Range    PTT 33.7 24.3 - 38.1 seconds   BNP   Result Value Ref Range    proBNP 315.7 5.0 - 450.0 pg/mL              ED Course  ED Course   Comment By Time   CONSULT        Provider: Dr. Pepper - [facility]    Discussion: Accepts admit M/S c tele    Agreeable c treatment and planned disposition.         Mason Rizzo MD 02/07 1241                  [facility]  Number of Diagnoses or Management Options  Acute kidney injury:   Acute metabolic encephalopathy:   Acute respiratory failure with hypoxia:   Leukocytosis, unspecified type:   Pneumonia due to infectious organism, unspecified laterality, unspecified part of lung:   Troponin I above reference range:   Volume depletion:      Amount and/or Complexity of Data Reviewed  Clinical lab tests: reviewed and ordered  Tests in the radiology section of CPT®: ordered and reviewed  Decide to obtain previous medical records or to obtain history from someone other than the patient: yes  Review and summarize past medical records: yes (Recent hospitalization for right lower lobe of MRSA pneumonia)  Discuss the patient with other providers: yes  Independent visualization of images, tracings, or specimens: yes    Critical Care  Total time providing  critical care: 30-74 minutes      Final diagnoses:   Acute respiratory failure with hypoxia   Pneumonia due to infectious organism, unspecified laterality, unspecified part of lung   Acute metabolic encephalopathy   Leukocytosis, unspecified type   Volume depletion   Acute kidney injury   Troponin I above reference range            Mason Rizzo MD  02/07/17 [id number]

## 2017-02-07 NOTE — PROGRESS NOTES
Pharmacy was consulted to dose Lovenox for DVT prophylaxis.    CrCl=34.3mL/min    Start Lovenox 40mg subq q24h for CrCl > 30mL/min.  Pharmacy will continue to follow.     Jillian Jeff, PharmD  2/7/2017  4:24 PM

## 2017-02-07 NOTE — H&P
"[facility] HOSPITALIST     Thomas Amos MD    CHIEF COMPLAINT: fever/noisy breathing    HISTORY OF PRESENT ILLNESS:    81 YOM presented through ER from SNF secondary to concerns with fever/noisy breathing/hypotension. All information is obtained from the chart as the patient is only able to state his last name and birthdate in a whispering voice and appears quite confused. Per ER report, he c/o right knee pain with OA.    On exam the patient is grabbing call light pressing buttons, looking around. He does not answer questions other than name and birthdate. He shakes his head \"no\" to having any pain/soa/n/v/d or other concerns. No other information is available at the time of this interview.    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 No pertinent past surgical history.  Family History   Problem Relation Age of Onset   • Family history unknown: Yes     Social History   Substance Use Topics   • Smoking status: Former Smoker     Packs/day: 1.00     Years: 39.00     Types: Cigarettes     Quit date: 1992   • Smokeless tobacco: None   • Alcohol use No     Prescriptions Prior to Admission   Medication Sig Dispense Refill Last Dose   • acetaminophen (TYLENOL) 500 MG tablet Take 500 mg by mouth Every 4 (Four) Hours As Needed for mild pain (1-3) or fever.   2/7/2017 " at 0900   • ACETAMINOPHEN-CODEINE #3 PO Take 1 tablet by mouth 3 (Three) Times a Day.   2/7/2017 at 0900   • baclofen (LIORESAL) 10 MG tablet Take 10 mg by mouth 3 (Three) Times a Day.      • Benzalkonium Chloride (REMEDY ANTIMICROBIAL CLEANSER EX) Apply 1 application topically Daily.   2/7/2017 at 0700   • budesonide (PULMICORT) 0.5 MG/2ML nebulizer solution Take 0.5 mg by nebulization 2 (Two) Times a Day.   2/7/2017 at 0900   • bumetanide (BUMEX) 1 MG tablet Take 1 mg by mouth 2 (Two) Times a Day. Patient takes 2 mg in the morning at 0800; 1 mg in the afternoon at 1400   2/6/2017 at 1400   • dimethicone (REMEDY NUTRASHIELD) 1 % cream Apply 1 application topically 2 (Two) Times a Day.   2/6/2017 at 1900   • docusate sodium 100 MG capsule Take 100 mg by mouth 2 (Two) Times a Day.  0 2/7/2017 at 0900   • enoxaparin (LOVENOX) 80 MG/0.8ML solution syringe Inject 80 mg under the skin Every 12 (Twelve) Hours.   2/7/2017 at 0600   • ferrous sulfate 220 (44 FE) MG/5ML solution Take 325.6 mg by mouth Daily.   2/7/2017 at 0900   • fluticasone (FLONASE) 50 MCG/ACT nasal spray 2 sprays into each nostril Daily.   2/7/2017 at 0900   • guaiFENesin (MUCINEX) 600 MG 12 hr tablet Take 600 mg by mouth 2 (Two) Times a Day.      • insulin aspart (novoLOG) 100 UNIT/ML injection Inject 0-7 Units under the skin 4 (Four) Times a Day Before Meals & at Bedtime. (Patient taking differently: Inject  under the skin 4 (Four) Times a Day Before Meals & at Bedtime. Per sliding scale)  12 2/7/2017 at 0630   • Insulin Glargine (LANTUS SOLOSTAR) 100 UNIT/ML injection pen Inject 44 Units under the skin Daily. In am   2/6/2017 at 0630   • Insulin Lispro (HUMALOG SC) Inject 3 Units under the skin 3 (Three) Times a Day With Meals. Hold if blood sugar is below 100   2/7/2017 at 0630   • isosorbide dinitrate (ISORDIL) 10 MG tablet Take 10 mg by mouth 3 (Three) Times a Day.   2/6/2017 at 2100   • potassium chloride (KAYCIEL) 20 MEQ/15ML (10%) solution Take  20 mEq by mouth Daily.   2/7/2017 at 0900   • Sennosides (SENNA) 8.8 MG/5ML syrup Take 5 mL by mouth Every Night.   2/6/2017 at 2100   • simvastatin (ZOCOR) 10 MG tablet Take 10 mg by mouth Every Night.   2/6/2017 at 2100   • SITagliptin (JANUVIA) 100 MG tablet Take 100 mg by mouth Daily.   2/7/2017 at 0900   • vitamin D (ERGOCALCIFEROL) 04342 UNITS capsule capsule Take 50,000 Units by mouth Every 7 (Seven) Days. On Friday   2/3/2017 at 0900   • zinc oxide 20 % ointment Apply  topically 3 (Three) Times a Day. & prn incontinent episodes   2/6/2017 at 1900   • bisacodyl (DULCOLAX) 10 MG suppository Insert 10 mg into the rectum Daily As Needed for constipation.   Unknown at Unknown time   • cefTRIAXone (ROCEPHIN) 1 G injection Inject 1 g into the shoulder, thigh, or buttocks 1 (One) Time. Was ordered for 2/3/17 but not documented that it was given.   Unknown at Unknown time   • Dimethicone (REMEDY SKIN REPAIR) 1.5 % cream Apply  topically Daily.   Unknown at Unknown time   • famotidine (PEPCID) 20 MG tablet Take 20 mg by mouth 2 (Two) Times a Day.   1/21/2017 at 2100   • ipratropium-albuterol (DUO-NEB) 0.5-2.5 mg/mL nebulizer Take 3 mL by nebulization Every 4 (Four) Hours As Needed for wheezing.   Unknown at Unknown time   • Lactobacillus (FLORANEX) pack oral packet Take 1 packet by mouth 2 (Two) Times a Day.   2/7/2017 at 0900   • lactulose (CHRONULAC) 10 GM/15ML solution Take 20 g by mouth Daily As Needed (constipation).   Unknown at Unknown time   • nitroglycerin (NITROSTAT) 0.4 MG SL tablet Place 1 tablet under the tongue Every 5 (Five) Minutes As Needed for chest pain. Take no more than 3 doses in 15 minutes.  12 Unknown at Unknown time   • Nystatin powder 2 (Two) Times a Day As Needed (to groin/erasto area incontinent episodes).   Unknown at Unknown time   • Probiotic Product (PROBIOTIC DAILY PO) Take 1 tablet by mouth 2 (Two) Times a Day.   1/21/2017 at Unknown time   • Sodium Phosphates (FLEET ENEMA) 7-19  "GM/118ML enema Insert  into the rectum Daily As Needed (constipation).   Unknown at Unknown time     Allergies:  Flomax [tamsulosin hcl]; Penicillins; Pravachol [pravastatin]; and Prevacid [lansoprazole]    REVIEW OF SYSTEMS:  Please see the above history of present illness for pertinent positives and negatives.  The remainder of the patient's systems have been reviewed and are negative.     Vital Signs  Temp:  [97.5 °F (36.4 °C)-97.9 °F (36.6 °C)] 97.5 °F (36.4 °C)  Heart Rate:  [104-138] 104  Resp:  [18-28] 18  BP: (101-128)/(65-95) 104/65  Oxygen Therapy  SpO2: 96 %  O2 Device: nasal cannula  Flow (L/min): 2}  Body mass index is 22.87 kg/(m^2).  Flowsheet Rows         First Filed Value    Admission Height  72\" (182.9 cm) Documented at 02/07/2017 1023    Admission Weight  145 lb (65.8 kg) Documented at 02/07/2017 1045             Physical Exam:  Physical Exam   Constitutional: Patient appears well-developed and well-nourished and in no acute distress, elderly   HEENT:   Head: Normocephalic and atraumatic.   Eyes:  Pupils are equal, round, and reactive to light. EOM are intact. Sclera are anicteric and non-injected.  Mouth and Throat: Patient has moist mucous membranes. Oropharynx is clear of any erythema or exudate.     Neck: Neck supple. No JVD present. No thyromegaly present. No lymphadenopathy present.  Cardiovascular: tachycardic, irregular S1 normal and S2 normal.  Exam reveals no gallop and no friction rub.  No murmur heard.  Pulmonary/Chest: Lungs are clear to auscultation bilaterally, diminished. No respiratory distress. No wheezes. No rhonchi. No rales.   Abdominal: Soft. Bowel sounds are normal. No distension and no mass. There is no hepatosplenomegaly. There is no tenderness.   : fuller cath in place with clear yellow urine noted  Musculoskeletal: Normal Muscle tone  Extremities: No edema. Pulses are palpable in all 4 extremities.  Neurological: Patient is alert. Cranial nerves II-XII are grossly " intact with no focal deficits.  Skin: Skin is warm. No rash noted. Nails show no clubbing.  No cyanosis or erythema.     Results Review:    I reviewed the patient's new clinical results.  Lab Results (most recent)     Procedure Component Value Units Date/Time    Blood Culture [[id number]] Collected:  02/07/17 1048    Specimen:  Blood from Arm, Left Updated:  02/07/17 1054    CBC & Differential [[id number]] Collected:  02/07/17 1048    Specimen:  Blood Updated:  02/07/17 1107    Narrative:       The following orders were created for panel order CBC & Differential.  Procedure                               Abnormality         Status                     ---------                               -----------         ------                     CBC Auto Differential[[id number]]         Abnormal            Final result                 Please view results for these tests on the individual orders.    CBC Auto Differential [[id number]]  (Abnormal) Collected:  02/07/17 1048    Specimen:  Blood Updated:  02/07/17 1107     WBC 14.63 (H) 10*3/mm3      RBC 5.54 10*6/mm3      Hemoglobin 15.8 g/dL      Hematocrit 50.9 %      MCV 91.9 fL      MCH 28.5 pg      MCHC 31.0 g/dL      RDW 14.0 %      RDW-SD 46.7 fl      MPV 10.6 (H) fL      Platelets 286 10*3/mm3      Neutrophil % 79.3 (H) %      Lymphocyte % 13.3 (L) %      Monocyte % 6.3 %      Eosinophil % 0.4 %      Basophil % 0.3 %      Immature Grans % 0.4 %      Neutrophils, Absolute 11.59 (H) 10*3/mm3      Lymphocytes, Absolute 1.95 10*3/mm3      Monocytes, Absolute 0.92 10*3/mm3      Eosinophils, Absolute 0.06 (L) 10*3/mm3      Basophils, Absolute 0.05 10*3/mm3      Immature Grans, Absolute 0.06 (H) 10*3/mm3      nRBC 0.0 /100 WBC     Urinalysis With / Culture If Indicated [[id number]]  (Abnormal) Collected:  02/07/17 1051    Specimen:  Urine from Urine, Clean Catch Updated:  02/07/17 1113     Color, UA Yellow      Appearance, UA Clear      pH, UA 6.0      Specific [place], UA 1.015       Glucose, UA Negative      Ketones, UA Negative      Bilirubin, UA Negative      Blood, UA Moderate (2+) (A)      Protein, UA 30 mg/dL (1+) (A)      Leuk Esterase, UA Negative      Nitrite, UA Negative      Urobilinogen, UA 0.2 E.U./dL     Blood Culture [[id number]] Collected:  02/07/17 1108    Specimen:  Blood from Arm, Right Updated:  02/07/17 1115    Lactic Acid, Plasma [[id number]]  (Normal) Collected:  02/07/17 1048    Specimen:  Blood Updated:  02/07/17 1125     Lactate 1.8 mmol/L     Urine Culture [[id number]] Collected:  02/07/17 1051    Specimen:  Urine from Urine, Clean Catch Updated:  02/07/17 1135    Urinalysis, Microscopic Only [[id number]]  (Abnormal) Collected:  02/07/17 1051    Specimen:  Urine from Urine, Clean Catch Updated:  02/07/17 1137     RBC, UA None Seen /HPF      WBC, UA 0-2 (A) /HPF      Bacteria, UA Trace (A) /HPF      Squamous Epithelial Cells, UA 0-2 /HPF      Yeast, UA  /HPF      Moderate/2+ Budding Yeast w/Hyphae     Hyaline Casts, UA None Seen /LPF      Methodology Manual Light Microscopy     Protime-INR [[id number]]  (Abnormal) Collected:  02/07/17 1048    Specimen:  Blood Updated:  02/07/17 1148     Protime 15.2 (H) Seconds      INR 1.19 (H)     Narrative:       Therapeutic Ranges for INR: 2.0-3.0 (PT 20-30)                              2.5-3.5 (PT 25-34)    aPTT [[id number]]  (Normal) Collected:  02/07/17 1048    Specimen:  Blood Updated:  02/07/17 1148     PTT 33.7 seconds     Narrative:       PTT = The equivalent PTT values for the therapeutic range of heparin levels at 0.1 to 0.7 U/ml are 53 to 110 seconds.    Troponin [[id number]]  (Abnormal) Collected:  02/07/17 1048    Specimen:  Blood Updated:  02/07/17 1152     Troponin T 0.036 (H) ng/mL     Narrative:       Troponin T Reference Ranges:  Less than 0.03 ng/mL:    Negative for AMI  0.03 to 0.09 ng/mL:      Indeterminant for AMI  Greater than 0.09 ng/mL: Positive for AMI    Comprehensive Metabolic Panel [[id number]]  (Abnormal) Collected:   02/07/17 1048    Specimen:  Blood Updated:  02/07/17 1154     Glucose 249 (H) mg/dL      BUN 33 (H) mg/dL      Creatinine 1.58 (H) mg/dL      Sodium 154 (H) mmol/L      Potassium 4.0 mmol/L      Chloride 107 mmol/L      CO2 32.8 (H) mmol/L      Calcium 9.5 mg/dL      Total Protein 8.5 g/dL      Albumin 3.60 g/dL      ALT (SGPT) 13 U/L      AST (SGOT) 19 U/L      Alkaline Phosphatase 122 U/L      Total Bilirubin 0.7 mg/dL      eGFR Non African Amer 42 (L) mL/min/1.73      Globulin 4.9 gm/dL      A/G Ratio 0.7 g/dL      BUN/Creatinine Ratio 20.9      Anion Gap 14.2 mmol/L     Narrative:       The MDRD GFR formula is only valid for adults with stable renal function between ages 18 and 70.    BNP [[id number]]  (Normal) Collected:  02/07/17 1048    Specimen:  Blood Updated:  02/07/17 1203     proBNP 315.7 pg/mL     Narrative:       Among patients with dyspnea, NT-proBNP is highly sensitive for the detection of acute congestive heart failure. In addition NT-proBNP of <300 pg/ml effectively rules out acute congestive heart failure with 99% negative predictive value.          Imaging Results (most recent)     Procedure Component Value Units Date/Time    XR Chest 2 View [[id number]] Collected:  02/07/17 1206     Updated:  02/07/17 1210    Narrative:       CHEST X-RAY, 02/07/2017:     HISTORY:   81-year-old male in the ED with 2-3 day history of shortness of air,  cough and fever.     TECHNIQUE:  AP and lateral upright chest series.     FINDINGS:  The lungs are expanded and clear. Heart size and pulmonary vascularity  are normal. No visible pulmonary infiltrate or pleural effusion. No  change since 01/21/2017. Advanced chronic arthropathy both shoulders.       Impression:       No active disease in the chest. No change since 01/21/2017.     This report was finalized on 2/7/2017 12:08 PM by Dr. Josesito Michel MD.           Reviewed report    ECG/EMG Results (most recent)     Procedure Component Value Units Date/Time    ECG 12  Lead [[id number]] Collected:  02/07/17 1039     Updated:  02/07/17 1212    Narrative:       RR Interval= 492 ms  NV Interval= 140 ms  QRSD Interval= 94 ms  QT Interval= 324 ms  QTc Interval= 462 ms  Heart Rate= 122 ms  P Axis= 73 deg  QRS Axis= 14 deg  T Wave Axis= 41 deg  I: 40 Axis= -23 deg  T: 40 Axis= 63 deg  ST Axis= 186 deg  SINUS TACHYCARDIA  PROBABLE INFERIOR INFARCT, AGE INDETERMINATE  NO SIGNIFICANT CHANGE FROM PREVIOUS ECG  Electronically Signed by:  Eusebio Bowling ([facility]) 07-Feb-2017 12:07:44  Date and Time of Study: 2017-02-07 10:39:32        Reviewed report    Assessment/Plan      1. Acute hypoxic respiratory failure:   Recent discharge 1/26/17 with RLL PNA and MRSA with sepsis  Previously treated with Zyvox and Levaquin (ended approx 5 days ago)  Add duonebs/guaifenesin/IS/acapella/budesonide nebs  SLP eval pending  Received levaquin and vancomycin x 1 dose in ER  Check resp viral/procal/sputum culture/mrsa nares    2. Leukocytosis: possibly reactive, afebrile, monitor   As per number 1, received dose of IV abx  Await culture data and micro  Will need dosing of abx tomorrow    3. Indeterminate troponin: likely secondary to FREDY  Continue hydration, repeat troponin, monitor on telemetry    4. Acute kidney injury:   H/O urinary retention and BPH-now with fuller cath in place  Hold bumex  Continue IV hydration as per number 5    5. Hypernatremia:   Bolus 250 ml LR now  change IVFs to LR from NS at 100 ml/hr    6. Hypertension: low goal off meds, on IVFs, monitor    7. DM2:   Glucose elevated  check A1C  Continue partial dose home levemir at 30 units nightly (home dose 44 units)  Add SSI and accuchecks ac/hs  Hold januvia    8. H/O CHF: no acute issues monitor on IVFs  Sinus tachy on EKG, no significant change from previous per Dr. Bowling    9. CAD/hyperlipidemia: nothing acute currently, hold statin/imdur for now    10. GERD: famotidine bid    11. H/O DVT/TIA: no acute issues, previously on therapeutic lovenox  dosing    12. H/O DEYSI: no acute issues, monitor    DVT prophy: noted previously on therapeutic dose lovenox, unclear why.  Will place on pharmacy dosing lovenox/scd's/pepcid    I discussed the patients findings and my recommendations with patient.    Fabienne Melendez, MAURICE  02/07/17  4:20 PM

## 2017-02-08 LAB
ANION GAP SERPL CALCULATED.3IONS-SCNC: 12.4 MMOL/L
BACTERIA UR QL AUTO: ABNORMAL /HPF
BASOPHILS # BLD AUTO: 0.03 10*3/MM3 (ref 0–0.2)
BASOPHILS NFR BLD AUTO: 0.2 % (ref 0–2)
BILIRUB UR QL STRIP: NEGATIVE
BUN BLD-MCNC: 28 MG/DL (ref 8–23)
BUN/CREAT SERPL: 24.8 (ref 7–25)
CALCIUM SPEC-SCNC: 8.9 MG/DL (ref 8.8–10.5)
CHLORIDE SERPL-SCNC: 112 MMOL/L (ref 98–107)
CLARITY UR: ABNORMAL
CO2 SERPL-SCNC: 32.6 MMOL/L (ref 22–29)
COLOR UR: YELLOW
CREAT BLD-MCNC: 1.13 MG/DL (ref 0.76–1.27)
DEPRECATED RDW RBC AUTO: 47.7 FL (ref 37–54)
EOSINOPHIL # BLD AUTO: 0.06 10*3/MM3 (ref 0.1–0.3)
EOSINOPHIL NFR BLD AUTO: 0.5 % (ref 0–4)
ERYTHROCYTE [DISTWIDTH] IN BLOOD BY AUTOMATED COUNT: 13.9 % (ref 11.5–14.5)
GFR SERPL CREATININE-BSD FRML MDRD: 62 ML/MIN/1.73
GLUCOSE BLD-MCNC: 113 MG/DL (ref 65–99)
GLUCOSE BLDC GLUCOMTR-MCNC: 212 MG/DL (ref 70–130)
GLUCOSE BLDC GLUCOMTR-MCNC: 254 MG/DL (ref 70–130)
GLUCOSE BLDC GLUCOMTR-MCNC: 320 MG/DL (ref 70–130)
GLUCOSE BLDC GLUCOMTR-MCNC: 329 MG/DL (ref 70–130)
GLUCOSE BLDC GLUCOMTR-MCNC: 48 MG/DL (ref 70–130)
GLUCOSE BLDC GLUCOMTR-MCNC: 50 MG/DL (ref 70–130)
GLUCOSE BLDC GLUCOMTR-MCNC: 79 MG/DL (ref 70–130)
GLUCOSE UR STRIP-MCNC: ABNORMAL MG/DL
HCT VFR BLD AUTO: 45.9 % (ref 42–52)
HGB BLD-MCNC: 13.9 G/DL (ref 14–18)
HGB UR QL STRIP.AUTO: ABNORMAL
HYALINE CASTS UR QL AUTO: ABNORMAL /LPF
IMM GRANULOCYTES # BLD: 0.08 10*3/MM3 (ref 0–0.03)
IMM GRANULOCYTES NFR BLD: 0.6 % (ref 0–0.5)
KETONES UR QL STRIP: NEGATIVE
LEUKOCYTE ESTERASE UR QL STRIP.AUTO: ABNORMAL
LYMPHOCYTES # BLD AUTO: 1.6 10*3/MM3 (ref 0.6–4.8)
LYMPHOCYTES NFR BLD AUTO: 12 % (ref 20–45)
MCH RBC QN AUTO: 28.5 PG (ref 27–31)
MCHC RBC AUTO-ENTMCNC: 30.3 G/DL (ref 31–37)
MCV RBC AUTO: 94.3 FL (ref 80–94)
MONOCYTES # BLD AUTO: 0.57 10*3/MM3 (ref 0–1)
MONOCYTES NFR BLD AUTO: 4.3 % (ref 3–8)
NEUTROPHILS # BLD AUTO: 10.95 10*3/MM3 (ref 1.5–8.3)
NEUTROPHILS NFR BLD AUTO: 82.4 % (ref 45–70)
NITRITE UR QL STRIP: NEGATIVE
NRBC BLD MANUAL-RTO: 0 /100 WBC (ref 0–0)
PH UR STRIP.AUTO: 6.5 [PH] (ref 4.5–8)
PLATELET # BLD AUTO: 206 10*3/MM3 (ref 140–500)
PMV BLD AUTO: 11 FL (ref 7.4–10.4)
POTASSIUM BLD-SCNC: 3.3 MMOL/L (ref 3.5–5.2)
PROCALCITONIN SERPL-MCNC: 0.11 NG/ML (ref 0.1–0.25)
PROT UR QL STRIP: ABNORMAL
RBC # BLD AUTO: 4.87 10*6/MM3 (ref 4.7–6.1)
RBC # UR: ABNORMAL /HPF
REF LAB TEST METHOD: ABNORMAL
SODIUM BLD-SCNC: 157 MMOL/L (ref 136–145)
SP GR UR STRIP: 1.02 (ref 1–1.03)
SQUAMOUS #/AREA URNS HPF: ABNORMAL /HPF
TROPONIN T SERPL-MCNC: 0.02 NG/ML (ref 0–0.03)
UROBILINOGEN UR QL STRIP: ABNORMAL
WBC NRBC COR # BLD: 13.29 10*3/MM3 (ref 4.8–10.8)
WBC UR QL AUTO: ABNORMAL /HPF
YEAST URNS QL MICRO: ABNORMAL /HPF

## 2017-02-08 PROCEDURE — 63710000001 INSULIN DETEMIR PER 5 UNITS: Performed by: NURSE PRACTITIONER

## 2017-02-08 PROCEDURE — 63710000001 INSULIN ASPART PER 5 UNITS: Performed by: NURSE PRACTITIONER

## 2017-02-08 PROCEDURE — 94799 UNLISTED PULMONARY SVC/PX: CPT

## 2017-02-08 PROCEDURE — 87086 URINE CULTURE/COLONY COUNT: CPT | Performed by: INTERNAL MEDICINE

## 2017-02-08 PROCEDURE — 94640 AIRWAY INHALATION TREATMENT: CPT

## 2017-02-08 PROCEDURE — 84484 ASSAY OF TROPONIN QUANT: CPT | Performed by: NURSE PRACTITIONER

## 2017-02-08 PROCEDURE — 25010000002 ENOXAPARIN PER 10 MG: Performed by: NURSE PRACTITIONER

## 2017-02-08 PROCEDURE — 82962 GLUCOSE BLOOD TEST: CPT

## 2017-02-08 PROCEDURE — 80048 BASIC METABOLIC PNL TOTAL CA: CPT | Performed by: NURSE PRACTITIONER

## 2017-02-08 PROCEDURE — 84145 PROCALCITONIN (PCT): CPT | Performed by: NURSE PRACTITIONER

## 2017-02-08 PROCEDURE — 81001 URINALYSIS AUTO W/SCOPE: CPT | Performed by: INTERNAL MEDICINE

## 2017-02-08 PROCEDURE — 99232 SBSQ HOSP IP/OBS MODERATE 35: CPT | Performed by: INTERNAL MEDICINE

## 2017-02-08 PROCEDURE — 87899 AGENT NOS ASSAY W/OPTIC: CPT | Performed by: INTERNAL MEDICINE

## 2017-02-08 PROCEDURE — 85025 COMPLETE CBC W/AUTO DIFF WBC: CPT | Performed by: NURSE PRACTITIONER

## 2017-02-08 RX ORDER — IPRATROPIUM BROMIDE AND ALBUTEROL SULFATE 2.5; .5 MG/3ML; MG/3ML
3 SOLUTION RESPIRATORY (INHALATION)
Status: DISCONTINUED | OUTPATIENT
Start: 2017-02-08 | End: 2017-02-10 | Stop reason: HOSPADM

## 2017-02-08 RX ORDER — POTASSIUM CHLORIDE 20 MEQ/1
20 TABLET, EXTENDED RELEASE ORAL ONCE
Status: COMPLETED | OUTPATIENT
Start: 2017-02-08 | End: 2017-02-08

## 2017-02-08 RX ADMIN — GUAIFENESIN 1200 MG: 600 TABLET, EXTENDED RELEASE ORAL at 18:34

## 2017-02-08 RX ADMIN — INSULIN ASPART 4 UNITS: 100 INJECTION, SOLUTION INTRAVENOUS; SUBCUTANEOUS at 18:40

## 2017-02-08 RX ADMIN — INSULIN ASPART 5 UNITS: 100 INJECTION, SOLUTION INTRAVENOUS; SUBCUTANEOUS at 12:05

## 2017-02-08 RX ADMIN — FAMOTIDINE 20 MG: 20 TABLET, FILM COATED ORAL at 07:59

## 2017-02-08 RX ADMIN — IPRATROPIUM BROMIDE AND ALBUTEROL SULFATE 3 ML: .5; 3 SOLUTION RESPIRATORY (INHALATION) at 07:27

## 2017-02-08 RX ADMIN — BUDESONIDE 0.5 MG: 0.5 SUSPENSION RESPIRATORY (INHALATION) at 19:31

## 2017-02-08 RX ADMIN — IPRATROPIUM BROMIDE AND ALBUTEROL SULFATE 3 ML: .5; 3 SOLUTION RESPIRATORY (INHALATION) at 19:31

## 2017-02-08 RX ADMIN — Medication 1 CAPSULE: at 18:41

## 2017-02-08 RX ADMIN — FAMOTIDINE 20 MG: 20 TABLET, FILM COATED ORAL at 18:34

## 2017-02-08 RX ADMIN — MICONAZOLE NITRATE: 2 POWDER TOPICAL at 21:50

## 2017-02-08 RX ADMIN — IPRATROPIUM BROMIDE AND ALBUTEROL SULFATE 3 ML: .5; 3 SOLUTION RESPIRATORY (INHALATION) at 15:18

## 2017-02-08 RX ADMIN — INSULIN DETEMIR 30 UNITS: 100 INJECTION, SOLUTION SUBCUTANEOUS at 21:49

## 2017-02-08 RX ADMIN — Medication 1 CAPSULE: at 08:00

## 2017-02-08 RX ADMIN — POTASSIUM CHLORIDE 20 MEQ: 20 TABLET, EXTENDED RELEASE ORAL at 16:10

## 2017-02-08 RX ADMIN — BUDESONIDE 0.5 MG: 0.5 SUSPENSION RESPIRATORY (INHALATION) at 07:27

## 2017-02-08 RX ADMIN — SODIUM CHLORIDE, POTASSIUM CHLORIDE, SODIUM LACTATE AND CALCIUM CHLORIDE 100 ML/HR: 600; 310; 30; 20 INJECTION, SOLUTION INTRAVENOUS at 01:04

## 2017-02-08 RX ADMIN — GUAIFENESIN 1200 MG: 600 TABLET, EXTENDED RELEASE ORAL at 07:59

## 2017-02-08 RX ADMIN — ENOXAPARIN SODIUM 40 MG: 40 INJECTION SUBCUTANEOUS at 18:33

## 2017-02-08 RX ADMIN — GUAIFENESIN 1200 MG: 600 TABLET, EXTENDED RELEASE ORAL at 08:00

## 2017-02-08 RX ADMIN — IPRATROPIUM BROMIDE AND ALBUTEROL SULFATE 3 ML: .5; 3 SOLUTION RESPIRATORY (INHALATION) at 11:21

## 2017-02-08 RX ADMIN — FAMOTIDINE 20 MG: 20 TABLET, FILM COATED ORAL at 08:00

## 2017-02-08 RX ADMIN — Medication 1 CAPSULE: at 07:59

## 2017-02-08 RX ADMIN — SODIUM CHLORIDE, POTASSIUM CHLORIDE, SODIUM LACTATE AND CALCIUM CHLORIDE 100 ML/HR: 600; 310; 30; 20 INJECTION, SOLUTION INTRAVENOUS at 13:04

## 2017-02-08 RX ADMIN — INSULIN ASPART 3 UNITS: 100 INJECTION, SOLUTION INTRAVENOUS; SUBCUTANEOUS at 21:50

## 2017-02-08 NOTE — PLAN OF CARE
Problem: Patient Care Overview (Adult)  Goal: Discharge Needs Assessment  Outcome: Ongoing (interventions implemented as appropriate)    02/07/17 1433 02/07/17 1439 02/08/17 0937   Discharge Needs Assessment   Concerns To Be Addressed --  --  denies needs/concerns at this time   Readmission Within The Last 30 Days --  --  (1/21-1/26/17.)   Discharge Planning Comments --  --  patient is from the [facility]. spoke with [facility] [place] and patient does not currently have a bedhold but they will take him back when medically stable. patient will return skilled level of care. will continue to follow.    Current Health   Outpatient/Agency/Support Group Needs --  --  skilled nursing facility (specify)  ([facility])   Anticipated Changes Related to Illness --  --  (will continue to assess)   Living Environment   Transportation Available --  ambulance --    Self-Care   Equipment Currently Used at Home other (see comments)  (nebulizer, F/C) --  --

## 2017-02-08 NOTE — PLAN OF CARE
Problem: Patient Care Overview (Adult)  Goal: Plan of Care Review  Outcome: Ongoing (interventions implemented as appropriate)    02/08/17 1554   Coping/Psychosocial Response Interventions   Plan Of Care Reviewed With patient   Patient Care Overview   Progress (initial exam skin ulcers)   Outcome Evaluation   Outcome Summary/Follow up Plan WOCN consult re: present on admission pressure ulcerations at (L) heel: Deep Tissue Injury (DTI) and stage 2 pressure ulcer injury on sacrum.  Waffle mattress overlay over patient's new mattress/chair cushion; heel lift boots for added protection to heels & monitor heel ulcer (IF opens, daily Vaseline gauze dressing/Kerlix); Optifoam to sacrum ulcer for added cushioning due to incontinence stools (has fuller catheter); antifungal powder to perineal rash s/s candidiasis with satellite border lesions. Z-guard to buttocks for barrier due to MASD (moisture associated skin damage) due to stool incontinence. MD to examine penis (noted minor bleeding/irritation at meatus/glans); has anchor strap in place. ?patient pulling at catheter?         Problem: Pressure Ulcer (Adult)  Goal: Signs and Symptoms of Listed Potential Problems Will be Absent or Manageable (Pressure Ulcer)  Outcome: Ongoing (interventions implemented as appropriate)    02/08/17 6864   Pressure Ulcer   Problems Assessed (Pressure Ulcer) all   Problems Present (Pressure Ulcer) other (see comments)  (admitted with ulcers sacrum and heel)

## 2017-02-08 NOTE — NURSING NOTE
02/08/17 1540   Pressure Ulcer 02/07/17 1437 Left upper other (see comments) Stage II   Date first assessed/Time first assessed: 02/07/17 1437   Present On Admission (Pressure Ulcer): yes;picture taken  Side: Left  Orientation: upper  Location: (c) other (see comments)  Stage: Stage II  Additional Comments: zinc applied   Dressing Appearance (open to air; toward coccyx)   Pressure Ulcer Appearance reddened;moist   Periwound Area excoriated;dry;other (see comments)  (flaky/scaly/rash)   Length (Pressure Ulcer) (cm) 2.4   Width (Pressure Ulcer) (cm) 3.5   Depth (Pressure Ulcer) (cm) 0.1  (epithelial loss only)   Periwound Care barrier ointment applied   Progress (initial exam, present on admission)   Picture taken On admission   Pressure Ulcer 02/07/17 1437 Left heel suspected deep tissue injury   Date first assessed/Time first assessed: 02/07/17 1437   Present On Admission (Pressure Ulcer): yes;picture taken  Side: Left  Location: heel  Stage: suspected deep tissue injury  Additional Comments: Heels offloaded   Dressing Appearance (open to air)   Pressure Ulcer Appearance dry;ecchymotic;maroon;purple   Periwound Area redness   Length (Pressure Ulcer) (cm) 3.5   Width (Pressure Ulcer) (cm) 4   Depth (Pressure Ulcer) (cm) (n/a)   Progress (initial exam)   Picture taken On admission

## 2017-02-08 NOTE — PLAN OF CARE
Problem: Pneumonia (Adult)  Intervention: Maximize Oxygenation/Ventilation/Perfusion    02/08/17 0205   Promote Aggressive Pulmonary Hygiene/Secretion Management   Cough And Deep Breathing done with encouragement   Respiratory Interventions   Airway/Ventilation Management airway patency maintained;pulmonary hygiene promoted   Incentive Spirometer   Administration (Incentive Spirometer) done with encouragement   Positioning   Head Of Bed (HOB) Position HOB at 30-45 degrees

## 2017-02-08 NOTE — NURSING NOTE
patient is from the [facility]. spoke with [facility] and patient does not currently have a bedhold but they will take him back when medically stable. patient will return skilled level of care. will continue to follow.     spoke with patient at bedside. important message from medicare signed. per patient plan is to return to [facility] when medically stable. will continue to follow.

## 2017-02-08 NOTE — PROGRESS NOTES
"    HOSPITALIST SERVICES  @ [facility] [place]            HOSPITALIST TEAM   PROGRESS NOTE      Patient Care Team:  Thomas Amos MD as PCP - General  Thomas Amos MD as PCP - Family Medicine        Chief Complaint:        fever/noisy breathing      Subjective    Patient was admitted with acute respiratory failure with hypoxia, pneumonia and acute metabolic encephalopathy and leukocytosis, elevated Troponin and FREDY.        Interval History and ROS:     Patient States he feels better  Patient Complaints: says he is thirsty  Patient Denies:  Any sx of chest pain, shortness of breath, abdominal pain or n/v  History taken from: Patient      Objective    Vital Signs  Temp:  [97.6 °F (36.4 °C)-98.6 °F (37 °C)] 98.6 °F (37 °C)  Heart Rate:  [] 106  Resp:  [16-20] 17  BP: ()/(60-76) 120/76    Flowsheet Rows         First Filed Value    Admission Height  72\" (182.9 cm) Documented at 02/07/2017 1023    Admission Weight  145 lb (65.8 kg) Documented at 02/07/2017 1045              Physical Exam:      PHYSICAL EXAMINATION:    VITAL SIGNS: As per Nurse's notes    GENERAL APPEARANCE: The patient is a well developed, well nourished,  male, in no acute distress.     HEENT: Normocephalic, atraumatic. PERRL. The sclerae anicteric and conjunctivae pink and moist.    NECK: Supple and symmetric. No masses. No thyromegaly. No carotid bruits. No evidence of JVD.    SKIN: Warm, dry and intact. No rash or lesions or wounds or patechiae.    LUNGS: Accessory muscles of respiration are not active. There are a few rales on auscultation bilaterally.     CARDIOVASCULAR: RRR. S1, S2 normal without murmur/gallop/rub.     ABDOMEN: Soft, non-tender, non-distended. No masses. No rebound/guarding.     EXTREMITIES:  No evidence of cyanosis, clubbing, or edema.     MUSCULOSKELETAL: Muscle strength and tone normal.    PSYCHIATRY: Responds appropriately to questions. No evidence of acute anxiety, depression, panic attacks or " "hallucinations.    MENTAL STATUS EXAMINATION: Patient is alert and oriented x3. Thought Process WNL.     NEUROLOGIC: Examination is grossly intact globally with no focal deficits.           Results Review:     I reviewed the patient's new clinical results.    Lab Results (last 24 hours)     Procedure Component Value Units Date/Time    Hemoglobin A1c [[id number]]  (Abnormal) Collected:  02/07/17 1545    Specimen:  Blood Updated:  02/07/17 1604     Hemoglobin A1C 9.50 (H) %     Narrative:       Hemoglobin A1C Ranges:    Increased Risk for Diabetes  5.7% to 6.4%  Diabetes                     >= 6.5%  Diabetic Goal                < 7.0%    TSH [[id number]]  (Normal) Collected:  02/07/17 1048    Specimen:  Blood Updated:  02/07/17 1614     TSH 3.390 mIU/mL     POC Glucose Fingerstick [[id number]]  (Abnormal) Collected:  02/07/17 1659    Specimen:  Blood Updated:  02/07/17 1707     Glucose 159 (H) mg/dL     Narrative:       Meter: [id number] : [id number] Minjoselo BAILEY    Procalcitonin [[id number]]  (Normal) Collected:  02/07/17 1545    Specimen:  Blood Updated:  02/07/17 2144     Procalcitonin 0.11 ng/mL     Narrative:       As a Marker for Sepsis (Non-Neonates):   1. <0.5 ng/mL represents a low risk of severe sepsis and/or septic shock.  1. >2 ng/mL represents a high risk of severe sepsis and/or septic shock.    As a Marker for Lower Respiratory Tract Infections that require antibiotic therapy:  PCT on Admission     Antibiotic Therapy             6-12 Hrs later  > 0.5                Strongly Recommended            >0.25 - <0.5         Recommended  0.1 - 0.25           Discouraged                   Remeasure/reassess PCT  <0.1                 Strongly Discouraged          Remeasure/reassess PCT      As 28 day mortality risk marker: \"Change in Procalcitonin Result\" (> 80 % or <=80 %) if Day 0 (or Day 1) and Day 4 values are available. Refer to http://www.[employer]-pct-calculator.com/   Change in PCT <=80 %   A decrease of PCT " levels below or equal to 80 % defines a positive change in PCT test result representing a higher risk for 28-day all-cause mortality of patients diagnosed with severe sepsis or septic shock.  Change in PCT > 80 %   A decrease of PCT levels of more than 80 % defines a negative change in PCT result representing a lower risk for 28-day all-cause mortality of patients diagnosed with severe sepsis or septic shock.                POC Glucose Fingerstick [[id number]]  (Abnormal) Collected:  02/07/17 2154    Specimen:  Blood Updated:  02/07/17 2200     Glucose 286 (H) mg/dL     Narrative:       Meter: [id number] : [id number] Mani Hunter    MRSA Screen [[id number]] Collected:  02/07/17 2210    Specimen:  Swab from Nares Updated:  02/07/17 2223    Respiratory Panel, PCR [[id number]]  (Normal) Collected:  02/07/17 1620    Specimen:  Swab from Nasopharynx Updated:  02/07/17 2303     ADENOVIRUS, PCR Not Detected      Coronavirus 229E Not Detected      Coronavirus HKU1 Not Detected      Coronavirus NL63 Not Detected      Coronavirus OC43 Not Detected      Human Metapneumovirus Not Detected      Human Rhinovirus/Enterovirus Not Detected      Influenza B PCR Not Detected      Parainfluenza Virus 1 Not Detected      Parainfluenza Virus 2 Not Detected      Parainfluenza Virus 3 Not Detected      Parainfluenza Virus 4 Not Detected      Bordetella pertussis pcr Not Detected      Influenza 2009 H1N1 by PCR Not Detected      Chlamydophila pneumoniae PCR Not Detected      Mycoplasma pneumo by PCR Not Detected      Influenza A PCR Not Detected      Influenza A H3 Not Detected      Influenza A H1 Not Detected      RSV, PCR Not Detected     CBC & Differential [[id number]] Collected:  02/08/17 0418    Specimen:  Blood Updated:  02/08/17 0532    Narrative:       The following orders were created for panel order CBC & Differential.  Procedure                               Abnormality         Status                     ---------                                -----------         ------                     CBC Auto Differential[[id number]]         Abnormal            Final result                 Please view results for these tests on the individual orders.    CBC Auto Differential [[id number]]  (Abnormal) Collected:  02/08/17 0418    Specimen:  Blood Updated:  02/08/17 0532     WBC 13.29 (H) 10*3/mm3      RBC 4.87 10*6/mm3      Hemoglobin 13.9 (L) g/dL      Hematocrit 45.9 %      MCV 94.3 (H) fL      MCH 28.5 pg      MCHC 30.3 (L) g/dL      RDW 13.9 %      RDW-SD 47.7 fl      MPV 11.0 (H) fL      Platelets 206 10*3/mm3      Neutrophil % 82.4 (H) %      Lymphocyte % 12.0 (L) %      Monocyte % 4.3 %      Eosinophil % 0.5 %      Basophil % 0.2 %      Immature Grans % 0.6 (H) %      Neutrophils, Absolute 10.95 (H) 10*3/mm3      Lymphocytes, Absolute 1.60 10*3/mm3      Monocytes, Absolute 0.57 10*3/mm3      Eosinophils, Absolute 0.06 (L) 10*3/mm3      Basophils, Absolute 0.03 10*3/mm3      Immature Grans, Absolute 0.08 (H) 10*3/mm3      nRBC 0.0 /100 WBC     Troponin [[id number]]  (Normal) Collected:  02/08/17 0418    Specimen:  Blood Updated:  02/08/17 0533     Troponin T 0.022 ng/mL     Narrative:       Troponin T Reference Ranges:  Less than 0.03 ng/mL:    Negative for AMI  0.03 to 0.09 ng/mL:      Indeterminant for AMI  Greater than 0.09 ng/mL: Positive for AMI    Basic Metabolic Panel [[id number]]  (Abnormal) Collected:  02/08/17 0418    Specimen:  Blood Updated:  02/08/17 0543     Glucose 113 (H) mg/dL      BUN 28 (H) mg/dL      Creatinine 1.13 mg/dL      Sodium 157 (H) mmol/L      Potassium 3.3 (L) mmol/L      Chloride 112 (H) mmol/L      CO2 32.6 (H) mmol/L      Calcium 8.9 mg/dL      eGFR Non African Amer 62 mL/min/1.73      BUN/Creatinine Ratio 24.8      Anion Gap 12.4 mmol/L     Narrative:       The MDRD GFR formula is only valid for adults with stable renal function between ages 18 and 70.    POC Glucose Fingerstick [[id number]]  (Abnormal) Collected:  02/08/17  "0731    Specimen:  Blood Updated:  02/08/17 0747     Glucose 48 (C) mg/dL     Narrative:       Repeat Test Meter: [id number] : [id number] Shukri Watkins    POC Glucose Fingerstick [[id number]]  (Abnormal) Collected:  02/08/17 0733    Specimen:  Blood Updated:  02/08/17 0747     Glucose 50 (L) mg/dL     Narrative:       Meter: [id number] : [id number] Shukri Watkins    Procalcitonin [[id number]]  (Normal) Collected:  02/08/17 0418    Specimen:  Blood Updated:  02/08/17 0756     Procalcitonin 0.11 ng/mL     Narrative:       As a Marker for Sepsis (Non-Neonates):   1. <0.5 ng/mL represents a low risk of severe sepsis and/or septic shock.  1. >2 ng/mL represents a high risk of severe sepsis and/or septic shock.    As a Marker for Lower Respiratory Tract Infections that require antibiotic therapy:  PCT on Admission     Antibiotic Therapy             6-12 Hrs later  > 0.5                Strongly Recommended            >0.25 - <0.5         Recommended  0.1 - 0.25           Discouraged                   Remeasure/reassess PCT  <0.1                 Strongly Discouraged          Remeasure/reassess PCT      As 28 day mortality risk marker: \"Change in Procalcitonin Result\" (> 80 % or <=80 %) if Day 0 (or Day 1) and Day 4 values are available. Refer to http://www.[employer]athometers-pct-calculator.com/   Change in PCT <=80 %   A decrease of PCT levels below or equal to 80 % defines a positive change in PCT test result representing a higher risk for 28-day all-cause mortality of patients diagnosed with severe sepsis or septic shock.  Change in PCT > 80 %   A decrease of PCT levels of more than 80 % defines a negative change in PCT result representing a lower risk for 28-day all-cause mortality of patients diagnosed with severe sepsis or septic shock.                POC Glucose Fingerstick [[id number]]  (Normal) Collected:  02/08/17 0817    Specimen:  Blood Updated:  02/08/17 0824     Glucose 79 mg/dL     Narrative:       Meter: [id number] " : [id number] Shukri Watkins    Blood Culture [[id number]]  (Normal) Collected:  02/07/17 1048    Specimen:  Blood from Arm, Left Updated:  02/08/17 1101     Blood Culture No growth at 24 hours     POC Glucose Fingerstick [[id number]]  (Abnormal) Collected:  02/08/17 1146    Specimen:  Blood Updated:  02/08/17 1157     Glucose 329 (H) mg/dL     Narrative:       Meter: [id number] : [id number] Yuriy Chavira    POC Glucose Fingerstick [[id number]]  (Abnormal) Collected:  02/08/17 1149    Specimen:  Blood Updated:  02/08/17 1158     Glucose 320 (H) mg/dL     Narrative:       Meter: [id number] : [id number] Yuriy Chavira    Blood Culture [[id number]]  (Normal) Collected:  02/07/17 1108    Specimen:  Blood from Arm, Right Updated:  02/08/17 1201     Blood Culture No growth at 24 hours     Urine Culture [[id number]]  (Abnormal) Collected:  02/07/17 1051    Specimen:  Urine from Urine, Clean Catch Updated:  02/08/17 1335     Urine Culture >100,000 CFU/mL Yeast isolated (A)           Imaging Results (last 24 hours)     ** No results found for the last 24 hours. **          Xray reviewed personally by physician.      ECG not reviewed personally by physician  ECG/EMG Results (most recent)     Procedure Component Value Units Date/Time    ECG 12 Lead [[id number]] Collected:  02/07/17 1039     Updated:  02/07/17 1212    Narrative:       RR Interval= 492 ms  ID Interval= 140 ms  QRSD Interval= 94 ms  QT Interval= 324 ms  QTc Interval= 462 ms  Heart Rate= 122 ms  P Axis= 73 deg  QRS Axis= 14 deg  T Wave Axis= 41 deg  I: 40 Axis= -23 deg  T: 40 Axis= 63 deg  ST Axis= 186 deg  SINUS TACHYCARDIA  PROBABLE INFERIOR INFARCT, AGE INDETERMINATE  NO SIGNIFICANT CHANGE FROM PREVIOUS ECG  Electronically Signed by:  Eusebio Bowling ([facility]) 07-Feb-2017 12:07:44  Date and Time of Study: 2017-02-07 10:39:32          Medication Review:   I have reviewed the patient's current medication list    Current Facility-Administered Medications:   •   acetaminophen (TYLENOL) tablet 500 mg, 500 mg, Oral, Q4H PRN, Fabienne R [place], APRN  •  budesonide (PULMICORT) nebulizer solution 0.5 mg, 0.5 mg, Nebulization, BID - RT, Fabienne R Al, APRN, 0.5 mg at 02/08/17 0727  •  dextrose (D50W) solution 25 g, 25 g, Intravenous, Q15 Min PRN, Fabienne R Al, APRN  •  dextrose (GLUTOSE) oral gel 15 g, 15 g, Oral, Q15 Min PRN, Fabienne R [place], APRN  •  enoxaparin (LOVENOX) syringe 40 mg, 40 mg, Subcutaneous, Q24H, Fabienne R Al, APRN, 40 mg at 02/07/17 1819  •  famotidine (PEPCID) tablet 20 mg, 20 mg, Oral, BID, Fabienne R Al, APRN, 20 mg at 02/08/17 0800  •  glucagon (GLUCAGEN) injection 1 mg, 1 mg, Subcutaneous, Q15 Min PRN, Fabienne R Al, APRN  •  guaiFENesin (MUCINEX) 12 hr tablet 1,200 mg, 1,200 mg, Oral, BID, Fabienne R [place], APRN, 1,200 mg at 02/08/17 0800  •  insulin aspart (novoLOG) injection 0-7 Units, 0-7 Units, Subcutaneous, 4x Daily AC & at Bedtime, Fabienne R [place], APRN, 5 Units at 02/08/17 1205  •  insulin detemir (LEVEMIR) injection 30 Units, 30 Units, Subcutaneous, Nightly, Fabienne R Al, APRN, 30 Units at 02/07/17 2211  •  ipratropium-albuterol (DUO-NEB) nebulizer solution 3 mL, 3 mL, Nebulization, Q4H While Awake - RT, Angela Wiley MD, 3 mL at 02/08/17 1121  •  lactated ringers infusion, 100 mL/hr, Intravenous, Continuous, Fabienne R [place], APRN, Last Rate: 100 mL/hr at 02/08/17 1304, 100 mL/hr at 02/08/17 1304  •  lactobacillus acidophilus (RISAQUAD) capsule 1 capsule, 1 capsule, Oral, BID, Fabienne Melendez, MAURICE, 1 capsule at 02/08/17 0800  •  Pharmacy to Dose enoxaparin (LOVENOX), , Does not apply, Continuous PRN, Fabienne Melendez, MAURICE  •  sodium chloride 0.9 % flush 10 mL, 10 mL, Intravenous, PRN, Mason Rizzo MD  •  vancomycin (VANCOCIN) 1,250 mg in sodium chloride 0.9 % 250 mL IVPB, 20 mg/kg, Intravenous, Once, Mason Rizzo MD, 1,250 mg at 02/07/17 1821      Assessment/Plan       ASSESSMENT AND PLAN:        SUMMARY:    ?   PROPHYLAXIS:   -DVT Prophylaxis: On Inj. Lovenox   -Fuller Catheter: Not indicated at this time    NUTRITION AND FLUIDS:  -Diet/ Nutrition: 2 gm Na, low cholestreol, consistent carbohydrate diet   -Fluid Status/Electrolytes: D5 1/2NS 1 L with 20 mEq KCl 125 mL/Hr      SOCIAL ISSUES:    -Behavioral/ Agitation Issues: NONE   -Social Issues: Lives at home with his family.       THERAPEUTIC:    -ANTIBIOTICS: as per order sheet   -PAIN MANAGEMENT:               PRIMARY DIAGNOSES:    1. Acute hypoxic respiratory failure:   Recent discharge 1/26/17 with RLL PNA and MRSA with sepsis  Previously treated with Zyvox and Levaquin (ended approx 5 days ago)  Add duonebs/guaifenesin/IS/acapella/budesonide nebs  SLP eval pending  Received levaquin and vancomycin x 1 dose in ER  Check resp viral/procal/sputum culture/mrsa nares  Resp viral cultures negative; MRSA screen awaited     2. Leukocytosis: possibly reactive, afebrile, monitor   As per number 1, received dose of IV abx  Await culture data and micro  Will need dosing of abx tomorrow     3. Indeterminate troponin: likely secondary to FREDY  Continue hydration, repeat troponin, monitor on telemetry     4. Acute kidney injury:   H/O urinary retention and BPH-now with fuller cath in place  Hold bumex  Continue IV hydration as per number 5     5. Hypernatremia:   Bolus 250 ml LR now  change IVFs to LR from NS at 100 ml/hr     6. Hypertension: low goal off meds, on IVFs, monitor     7. DM2:   Glucose elevated  check A1C  Continue partial dose home levemir at 30 units nightly (home dose 44 units)  Add SSI and accuchecks ac/hs  Hold januvia     8. H/O CHF: no acute issues monitor on IVFs  Sinus tachy on EKG, no significant change from previous per Dr. Bowling     9. CAD/hyperlipidemia: nothing acute currently, hold statin/imdur for now     10. GERD: famotidine bid     11. H/O DVT/TIA: no acute issues, previously on therapeutic lovenox dosing     12. H/O DEYSI: no acute  issues, monitor     DVT prophy: noted previously on therapeutic dose lovenox, unclear why.  Will place on pharmacy dosing lovenox/scd's/pepcid  ?     SECONDARY DIAGNOSES:  ?     As above    SURGICAL DIAGNOSES:      No surgical hx available        PLAN:    -Labs and diagnostic tests reviewed: WBC 13.29, Hb 13.9, Plt 206, Gluc 92, Na 143, K 3.3, Creat 0.76, Troponin 0.022    -Diagnostic tests reviewed:    CXR  IMPRESSION:  No active disease in the chest. No change since 01/21/2017.      This report was finalized on 2/7/2017 12:08 PM by Dr. Josesito Michel MD.    EKG  SINUS TACHYCARDIA  PROBABLE INFERIOR INFARCT, AGE INDETERMINATE  NO SIGNIFICANT CHANGE FROM PREVIOUS ECG  Electronically Signed by:  Eusebio Bowling ([facility]) 07-Feb-2017 12:07:44  Date and Time of Study: 2017-02-07 10:39:32          Specimen Collected: 02/07/17 10:39 AM Last Resulted: 02/07/17 12:12 PM                -Any new recommendations: N/A    -New Labs ordered: CBC, CMP, Lactic acid    -New diagnostic tests ordered: N/A    -Any changes in medications: N/A    -Discharge planning issues: Patient should be able to go back to NH once ready for discharge    -Placement issues: N/A    -Patient is clinically and hemodynamically stable    -To continue current management and supportive care    -Will follow patient closely    -Nothing new to add for right now      TODAY'S DISCHARGE: Should be able to go to NH once ready for discharge        Plan for disposition:    Shailesh Worthy MD  02/08/17  3:16 PM            Shailesh Worthy M.D., FACP  Internal Medicine/ Hospitalist          Time:       EMR Dragon/Transcription disclaimer:      Much of this encounter note is an electronic transcription/translation of spoken language to printed text. The electronic translation of spoken language may permit erroneous, or at times, nonsensical words or phrases to be inadvertently transcribed; Although I have reviewed the note for such errors, some may still exist.

## 2017-02-09 LAB
ALBUMIN SERPL-MCNC: 2.4 G/DL (ref 3.5–5.2)
ALBUMIN/GLOB SERPL: 0.6 G/DL
ALP SERPL-CCNC: 83 U/L (ref 40–129)
ALT SERPL W P-5'-P-CCNC: 8 U/L (ref 5–41)
ANION GAP SERPL CALCULATED.3IONS-SCNC: 7.8 MMOL/L
AST SERPL-CCNC: 16 U/L (ref 5–40)
BACTERIA SPEC AEROBE CULT: ABNORMAL
BASOPHILS # BLD AUTO: 0.03 10*3/MM3 (ref 0–0.2)
BASOPHILS NFR BLD AUTO: 0.2 % (ref 0–2)
BILIRUB SERPL-MCNC: 0.5 MG/DL (ref 0.2–1.2)
BUN BLD-MCNC: 17 MG/DL (ref 8–23)
BUN/CREAT SERPL: 21 (ref 7–25)
CALCIUM SPEC-SCNC: 8.5 MG/DL (ref 8.8–10.5)
CHLORIDE SERPL-SCNC: 111 MMOL/L (ref 98–107)
CO2 SERPL-SCNC: 30.2 MMOL/L (ref 22–29)
CREAT BLD-MCNC: 0.81 MG/DL (ref 0.76–1.27)
D-LACTATE SERPL-SCNC: 0.8 MMOL/L (ref 0.5–2)
DEPRECATED RDW RBC AUTO: 46.9 FL (ref 37–54)
EOSINOPHIL # BLD AUTO: 0.16 10*3/MM3 (ref 0.1–0.3)
EOSINOPHIL NFR BLD AUTO: 1.1 % (ref 0–4)
ERYTHROCYTE [DISTWIDTH] IN BLOOD BY AUTOMATED COUNT: 13.9 % (ref 11.5–14.5)
GFR SERPL CREATININE-BSD FRML MDRD: 91 ML/MIN/1.73
GLOBULIN UR ELPH-MCNC: 3.8 GM/DL
GLUCOSE BLD-MCNC: 48 MG/DL (ref 65–99)
GLUCOSE BLDC GLUCOMTR-MCNC: 168 MG/DL (ref 70–130)
GLUCOSE BLDC GLUCOMTR-MCNC: 266 MG/DL (ref 70–130)
GLUCOSE BLDC GLUCOMTR-MCNC: 288 MG/DL (ref 70–130)
GLUCOSE BLDC GLUCOMTR-MCNC: 45 MG/DL (ref 70–130)
GLUCOSE BLDC GLUCOMTR-MCNC: 47 MG/DL (ref 70–130)
GLUCOSE BLDC GLUCOMTR-MCNC: 51 MG/DL (ref 70–130)
GLUCOSE BLDC GLUCOMTR-MCNC: 75 MG/DL (ref 70–130)
HCT VFR BLD AUTO: 39.2 % (ref 42–52)
HGB BLD-MCNC: 12.1 G/DL (ref 14–18)
IMM GRANULOCYTES # BLD: 0.06 10*3/MM3 (ref 0–0.03)
IMM GRANULOCYTES NFR BLD: 0.4 % (ref 0–0.5)
LYMPHOCYTES # BLD AUTO: 1.01 10*3/MM3 (ref 0.6–4.8)
LYMPHOCYTES NFR BLD AUTO: 7 % (ref 20–45)
MCH RBC QN AUTO: 28.9 PG (ref 27–31)
MCHC RBC AUTO-ENTMCNC: 30.9 G/DL (ref 31–37)
MCV RBC AUTO: 93.6 FL (ref 80–94)
MONOCYTES # BLD AUTO: 0.62 10*3/MM3 (ref 0–1)
MONOCYTES NFR BLD AUTO: 4.3 % (ref 3–8)
NEUTROPHILS # BLD AUTO: 12.5 10*3/MM3 (ref 1.5–8.3)
NEUTROPHILS NFR BLD AUTO: 87 % (ref 45–70)
NRBC BLD MANUAL-RTO: 0 /100 WBC (ref 0–0)
PLATELET # BLD AUTO: 181 10*3/MM3 (ref 140–500)
PMV BLD AUTO: 10.7 FL (ref 7.4–10.4)
POTASSIUM BLD-SCNC: 3.6 MMOL/L (ref 3.5–5.2)
PROT SERPL-MCNC: 6.2 G/DL (ref 6–8.5)
RBC # BLD AUTO: 4.19 10*6/MM3 (ref 4.7–6.1)
SODIUM BLD-SCNC: 149 MMOL/L (ref 136–145)
WBC NRBC COR # BLD: 14.38 10*3/MM3 (ref 4.8–10.8)

## 2017-02-09 PROCEDURE — 94640 AIRWAY INHALATION TREATMENT: CPT

## 2017-02-09 PROCEDURE — 83605 ASSAY OF LACTIC ACID: CPT | Performed by: INTERNAL MEDICINE

## 2017-02-09 PROCEDURE — 85025 COMPLETE CBC W/AUTO DIFF WBC: CPT | Performed by: INTERNAL MEDICINE

## 2017-02-09 PROCEDURE — 84484 ASSAY OF TROPONIN QUANT: CPT | Performed by: INTERNAL MEDICINE

## 2017-02-09 PROCEDURE — 25010000002 LEVOFLOXACIN PER 250 MG

## 2017-02-09 PROCEDURE — 82962 GLUCOSE BLOOD TEST: CPT

## 2017-02-09 PROCEDURE — 99232 SBSQ HOSP IP/OBS MODERATE 35: CPT | Performed by: INTERNAL MEDICINE

## 2017-02-09 PROCEDURE — 94799 UNLISTED PULMONARY SVC/PX: CPT

## 2017-02-09 PROCEDURE — 80053 COMPREHEN METABOLIC PANEL: CPT | Performed by: INTERNAL MEDICINE

## 2017-02-09 PROCEDURE — 25010000002 ENOXAPARIN PER 10 MG: Performed by: NURSE PRACTITIONER

## 2017-02-09 PROCEDURE — 63710000001 INSULIN DETEMIR PER 5 UNITS: Performed by: INTERNAL MEDICINE

## 2017-02-09 PROCEDURE — 63710000001 INSULIN ASPART PER 5 UNITS: Performed by: NURSE PRACTITIONER

## 2017-02-09 RX ORDER — LEVOFLOXACIN 5 MG/ML
500 INJECTION, SOLUTION INTRAVENOUS EVERY 24 HOURS
Status: DISCONTINUED | OUTPATIENT
Start: 2017-02-09 | End: 2017-02-09 | Stop reason: DRUGHIGH

## 2017-02-09 RX ORDER — LEVOFLOXACIN 5 MG/ML
250 INJECTION, SOLUTION INTRAVENOUS EVERY 24 HOURS
Status: DISCONTINUED | OUTPATIENT
Start: 2017-02-10 | End: 2017-02-10

## 2017-02-09 RX ORDER — LEVOFLOXACIN 5 MG/ML
INJECTION, SOLUTION INTRAVENOUS
Status: COMPLETED
Start: 2017-02-09 | End: 2017-02-09

## 2017-02-09 RX ADMIN — MICONAZOLE NITRATE: 2 POWDER TOPICAL at 21:26

## 2017-02-09 RX ADMIN — IPRATROPIUM BROMIDE AND ALBUTEROL SULFATE 3 ML: .5; 3 SOLUTION RESPIRATORY (INHALATION) at 15:14

## 2017-02-09 RX ADMIN — INSULIN ASPART 4 UNITS: 100 INJECTION, SOLUTION INTRAVENOUS; SUBCUTANEOUS at 17:18

## 2017-02-09 RX ADMIN — Medication 1 CAPSULE: at 17:18

## 2017-02-09 RX ADMIN — BUDESONIDE 0.5 MG: 0.5 SUSPENSION RESPIRATORY (INHALATION) at 19:45

## 2017-02-09 RX ADMIN — GUAIFENESIN 1200 MG: 600 TABLET, EXTENDED RELEASE ORAL at 09:02

## 2017-02-09 RX ADMIN — SODIUM CHLORIDE, POTASSIUM CHLORIDE, SODIUM LACTATE AND CALCIUM CHLORIDE 100 ML/HR: 600; 310; 30; 20 INJECTION, SOLUTION INTRAVENOUS at 15:45

## 2017-02-09 RX ADMIN — BUDESONIDE 0.5 MG: 0.5 SUSPENSION RESPIRATORY (INHALATION) at 07:36

## 2017-02-09 RX ADMIN — FAMOTIDINE 20 MG: 20 TABLET, FILM COATED ORAL at 09:02

## 2017-02-09 RX ADMIN — MICONAZOLE NITRATE: 2 POWDER TOPICAL at 10:29

## 2017-02-09 RX ADMIN — INSULIN ASPART 2 UNITS: 100 INJECTION, SOLUTION INTRAVENOUS; SUBCUTANEOUS at 12:41

## 2017-02-09 RX ADMIN — IPRATROPIUM BROMIDE AND ALBUTEROL SULFATE 3 ML: .5; 3 SOLUTION RESPIRATORY (INHALATION) at 19:45

## 2017-02-09 RX ADMIN — INSULIN DETEMIR 10 UNITS: 100 INJECTION, SOLUTION SUBCUTANEOUS at 21:24

## 2017-02-09 RX ADMIN — IPRATROPIUM BROMIDE AND ALBUTEROL SULFATE 3 ML: .5; 3 SOLUTION RESPIRATORY (INHALATION) at 07:36

## 2017-02-09 RX ADMIN — LEVOFLOXACIN 500 MG: 5 INJECTION, SOLUTION INTRAVENOUS at 01:27

## 2017-02-09 RX ADMIN — INSULIN ASPART 4 UNITS: 100 INJECTION, SOLUTION INTRAVENOUS; SUBCUTANEOUS at 21:25

## 2017-02-09 RX ADMIN — IPRATROPIUM BROMIDE AND ALBUTEROL SULFATE 3 ML: .5; 3 SOLUTION RESPIRATORY (INHALATION) at 11:12

## 2017-02-09 RX ADMIN — GUAIFENESIN 1200 MG: 600 TABLET, EXTENDED RELEASE ORAL at 17:25

## 2017-02-09 RX ADMIN — LEVOFLOXACIN 500 MG: 500 INJECTION, SOLUTION INTRAVENOUS at 01:27

## 2017-02-09 RX ADMIN — IPRATROPIUM BROMIDE AND ALBUTEROL SULFATE 3 ML: .5; 3 SOLUTION RESPIRATORY (INHALATION) at 23:19

## 2017-02-09 RX ADMIN — ENOXAPARIN SODIUM 40 MG: 40 INJECTION SUBCUTANEOUS at 17:25

## 2017-02-09 RX ADMIN — Medication 1 CAPSULE: at 09:02

## 2017-02-09 RX ADMIN — SODIUM CHLORIDE, POTASSIUM CHLORIDE, SODIUM LACTATE AND CALCIUM CHLORIDE 100 ML/HR: 600; 310; 30; 20 INJECTION, SOLUTION INTRAVENOUS at 05:03

## 2017-02-09 RX ADMIN — FAMOTIDINE 20 MG: 20 TABLET, FILM COATED ORAL at 17:25

## 2017-02-09 NOTE — PLAN OF CARE
Problem: Patient Care Overview (Adult)  Goal: Plan of Care Review  Outcome: Ongoing (interventions implemented as appropriate)    02/08/17 1940   Coping/Psychosocial Response Interventions   Plan Of Care Reviewed With patient   Patient Care Overview   Progress no change       Goal: Adult Individualization and Mutuality  Outcome: Ongoing (interventions implemented as appropriate)    Problem: Pneumonia (Adult)  Intervention: Maximize Oxygenation/Ventilation/Perfusion    02/08/17 1940   Promote Aggressive Pulmonary Hygiene/Secretion Management   Cough And Deep Breathing done with encouragement   Respiratory Interventions   Airway/Ventilation Management pulmonary hygiene promoted   Incentive Spirometer   Administration (Incentive Spirometer) done with encouragement   Positioning   Head Of Bed (HOB) Position HOB elevated   Activity   Activity Type activity adjusted per tolerance

## 2017-02-09 NOTE — PROGRESS NOTES
"Adult Nutrition  Assessment/PES    Patient Name:  Chance Bocanegra  YOB: 1935  MRN: [id number]  Admit Date:  2/7/2017    Assessment Date:  2/9/2017        Reason for Assessment       02/09/17 1554    Reason for Assessment    Reason For Assessment/Visit other (comment);nutrition order   inpatient hyperglycemia    Cardiac Other (comment);CAD;HTN   history of CHF    Endocrine DM    Infectious Disease MRSA;Sepsis    Pulmonary/Critical Care Acute respiratory failure;Pneumonia;Other (comment)   history of aspiration pneumonia    Other diagnosis hypernatremia              Nutrition/Diet History       02/09/17 1611    Nutrition/Diet History    Appetite Good   but question confusion with pt.            Anthropometrics       02/09/17 1559    Anthropometrics    Height 170.2 cm (67.01\")    RD Documented Weight on Admission 66.2 kg (146 lb)    Anthropometrics (Special Considerations)    RD Calculated BMI (kg/m2) 22.9    Ideal Body Weight (IBW)    Ideal Body Weight (IBW), Male (kg) 68.12    Usual Body Weight (UBW)    Usual Body Weight --   wt noted to be 171# on 1-4-17 but does have history of CHF and treatment with diuretics    Body Mass Index (BMI)    BMI Grade 19.1 - 24.9 - normal            Labs/Tests/Procedures/Meds       02/09/17 1600    Labs/Tests/Procedures/Meds    Labs/Tests Review Reviewed   hypoglycemia in am     Medication Review Reviewed, pertinent      02/09/17 1557    Labs/Tests/Procedures/Meds    Labs/Tests Review Reviewed;Na+   sodium 149    Medication Review Reviewed, pertinent            Physical Findings       02/09/17 1602    Physical Appearance    Skin pressure ulcer(s)   pressure ulcer left heel (deep tissue injury) and stage 2 on sacrum            Estimated/Assessed Needs       02/09/17 1603    Calculation Measurements    Weight Used For Calculations 66.2 kg (146 lb)    Height Used for Calculations 1.702 m (5' 7.01\")    Estimated/Assessed Energy Needs    Energy Need Method An Zaragoza    " Age 81    RMR ([place] Jeor Equation) 1326    Activity Factors ([place] Jeor)  Confined to bed  1.2    Total estimated needs ([place] Jeor) 1,591    Estimated/Assessed Protein Needs    Weight Used for Protein Calculation 66.2 kg (146 lb)    Protein (gm/kg) 1.0    1.0 Gm Protein (gm) 66.22    Estimated/Assessed Fluid Needs    Fluid Need Method Other (comment)   1,400-1,900 ml per day based upon evidenced based guidelines for heart failure            Nutrition Prescription Ordered       02/09/17 1607    Nutrition Prescription PO    Current PO Diet Pureed    Fluid Consistency Nectar/syrup thick    Supplement Glucerna Shake    Supplement Frequency 3 times a day    Common Modifiers Cardiac;Consistent Carbohydrate   diet provides approximately 180 grams of carbohdyrate per day which is 45% of estimated energy needs as cabohydrate            Evaluation of Received Nutrient/Fluid Intake       02/09/17 1609    Evaluation of Received Nutrient/Fluid Intake    Nutrition Delivered Fluid Evaluation    Fluid Intake Evaluation    Oral Fluid (mL) 1080   previous 24 hours    Total Fluid Intake (mL) 1080    % Fluid Needs 77% of lower end of estimated needs    PO Evaluation    Number of Meals 3    % PO Intake 63%              Problem/Interventions:        Problem 1       02/09/17 1613    Nutrition Diagnoses Problem 1    Problem 1 Predicted Suboptimal Intake    Signs/Symptoms (evidenced by) PO Intake;Fluid    Percent (%) intake recorded 63 %    Over number of meals 3    Percent (%) of estimated fluid need 77 %                    Intervention Goal       02/09/17 1615    Intervention Goal    PO PO intake (%);Increase intake    PO Intake % 75 %   or greater of meals and 50% or greater of supplements            Nutrition Intervention       02/09/17 1616    Nutrition Intervention    RD/Tech Action Interview for preference;Follow Tx progress;Encourage intake            Nutrition Prescription       02/09/17 1616    Other Orders     Calorie Count 3 day    Supplement Vitamin mineral supplement            Education/Evaluation       02/09/17 1616    Education    Education Education not appropriate at this time    Monitor/Evaluation    Monitor I&O;PO intake;Supplement intake;Pertinent labs;Weight;Skin status;Food/activity diary        Recommend:  Daily MVI to aid in wound healing and a calorie count to evaluate how well meeting estimated needs.    Electronically signed by:  Chey Franco RD  02/09/17 4:16 PM

## 2017-02-09 NOTE — PROGRESS NOTES
Pharmacy was consulted to dose Levaquin for a UTI.    SCr= 1.13; CrCl= 47.9; WBC= 14.38    Normal dosing for UTI: 250mg once daily.     No dosage adjustment necessary for CrCl.  Recommend x3 days for uncomplicated UTI or 10 days for complicated UTI.    Jillian Jeff, PharmD  2/9/2017  6:23 AM

## 2017-02-09 NOTE — PLAN OF CARE
Problem: Pneumonia (Adult)  Intervention: Maximize Oxygenation/Ventilation/Perfusion    02/09/17 1729   Promote Aggressive Pulmonary Hygiene/Secretion Management   Cough And Deep Breathing done with encouragement   Positioning   Head Of Bed (HOB) Position HOB at 30 degrees         02/09/17 1729   Promote Aggressive Pulmonary Hygiene/Secretion Management   Cough And Deep Breathing done with encouragement   Positioning   Head Of Bed (HOB) Position HOB at 30 degrees

## 2017-02-09 NOTE — PLAN OF CARE
Problem: Patient Care Overview (Adult)  Goal: Plan of Care Review  Outcome: Ongoing (interventions implemented as appropriate)    Problem: Pneumonia (Adult)  Goal: Signs and Symptoms of Listed Potential Problems Will be Absent or Manageable (Pneumonia)  Outcome: Ongoing (interventions implemented as appropriate)    Problem: Pressure Ulcer (Adult)  Goal: Signs and Symptoms of Listed Potential Problems Will be Absent or Manageable (Pressure Ulcer)  Outcome: Ongoing (interventions implemented as appropriate)

## 2017-02-10 VITALS
BODY MASS INDEX: 22.91 KG/M2 | OXYGEN SATURATION: 97 % | WEIGHT: 146 LBS | TEMPERATURE: 97.6 F | HEART RATE: 92 BPM | RESPIRATION RATE: 20 BRPM | HEIGHT: 67 IN | DIASTOLIC BLOOD PRESSURE: 71 MMHG | SYSTOLIC BLOOD PRESSURE: 129 MMHG

## 2017-02-10 LAB
ALBUMIN SERPL-MCNC: 2.2 G/DL (ref 3.5–5.2)
ALBUMIN/GLOB SERPL: 0.6 G/DL
ALP SERPL-CCNC: 80 U/L (ref 40–129)
ALT SERPL W P-5'-P-CCNC: 9 U/L (ref 5–41)
ANION GAP SERPL CALCULATED.3IONS-SCNC: 7.5 MMOL/L
AST SERPL-CCNC: 16 U/L (ref 5–40)
BACTERIA SPEC AEROBE CULT: ABNORMAL
BASOPHILS # BLD AUTO: 0.02 10*3/MM3 (ref 0–0.2)
BASOPHILS NFR BLD AUTO: 0.2 % (ref 0–2)
BILIRUB SERPL-MCNC: 0.5 MG/DL (ref 0.2–1.2)
BUN BLD-MCNC: 11 MG/DL (ref 8–23)
BUN/CREAT SERPL: 14.9 (ref 7–25)
CALCIUM SPEC-SCNC: 8.2 MG/DL (ref 8.8–10.5)
CHLORIDE SERPL-SCNC: 108 MMOL/L (ref 98–107)
CO2 SERPL-SCNC: 29.5 MMOL/L (ref 22–29)
CREAT BLD-MCNC: 0.74 MG/DL (ref 0.76–1.27)
D-LACTATE SERPL-SCNC: 1.3 MMOL/L (ref 0.5–2)
DEPRECATED RDW RBC AUTO: 46.8 FL (ref 37–54)
EOSINOPHIL # BLD AUTO: 0.31 10*3/MM3 (ref 0.1–0.3)
EOSINOPHIL NFR BLD AUTO: 3.3 % (ref 0–4)
ERYTHROCYTE [DISTWIDTH] IN BLOOD BY AUTOMATED COUNT: 13.7 % (ref 11.5–14.5)
GFR SERPL CREATININE-BSD FRML MDRD: 102 ML/MIN/1.73
GLOBULIN UR ELPH-MCNC: 3.6 GM/DL
GLUCOSE BLD-MCNC: 122 MG/DL (ref 65–99)
GLUCOSE BLDC GLUCOMTR-MCNC: 117 MG/DL (ref 70–130)
GLUCOSE BLDC GLUCOMTR-MCNC: 133 MG/DL (ref 70–130)
HCT VFR BLD AUTO: 37.2 % (ref 42–52)
HGB BLD-MCNC: 11.5 G/DL (ref 14–18)
IMM GRANULOCYTES # BLD: 0.04 10*3/MM3 (ref 0–0.03)
IMM GRANULOCYTES NFR BLD: 0.4 % (ref 0–0.5)
LYMPHOCYTES # BLD AUTO: 1.51 10*3/MM3 (ref 0.6–4.8)
LYMPHOCYTES NFR BLD AUTO: 16.2 % (ref 20–45)
MCH RBC QN AUTO: 29 PG (ref 27–31)
MCHC RBC AUTO-ENTMCNC: 30.9 G/DL (ref 31–37)
MCV RBC AUTO: 93.7 FL (ref 80–94)
MONOCYTES # BLD AUTO: 0.46 10*3/MM3 (ref 0–1)
MONOCYTES NFR BLD AUTO: 4.9 % (ref 3–8)
MRSA SPEC QL CULT: ABNORMAL
NEUTROPHILS # BLD AUTO: 6.96 10*3/MM3 (ref 1.5–8.3)
NEUTROPHILS NFR BLD AUTO: 75 % (ref 45–70)
NRBC BLD MANUAL-RTO: 0 /100 WBC (ref 0–0)
PLATELET # BLD AUTO: 156 10*3/MM3 (ref 140–500)
PMV BLD AUTO: 11.7 FL (ref 7.4–10.4)
POTASSIUM BLD-SCNC: 3.7 MMOL/L (ref 3.5–5.2)
PROT SERPL-MCNC: 5.8 G/DL (ref 6–8.5)
RBC # BLD AUTO: 3.97 10*6/MM3 (ref 4.7–6.1)
SODIUM BLD-SCNC: 145 MMOL/L (ref 136–145)
TROPONIN T SERPL-MCNC: <0.01 NG/ML (ref 0–0.03)
WBC NRBC COR # BLD: 9.3 10*3/MM3 (ref 4.8–10.8)

## 2017-02-10 PROCEDURE — 82962 GLUCOSE BLOOD TEST: CPT

## 2017-02-10 PROCEDURE — 94640 AIRWAY INHALATION TREATMENT: CPT

## 2017-02-10 PROCEDURE — 87070 CULTURE OTHR SPECIMN AEROBIC: CPT | Performed by: UROLOGY

## 2017-02-10 PROCEDURE — 87205 SMEAR GRAM STAIN: CPT | Performed by: UROLOGY

## 2017-02-10 PROCEDURE — 87147 CULTURE TYPE IMMUNOLOGIC: CPT | Performed by: UROLOGY

## 2017-02-10 PROCEDURE — 87186 SC STD MICRODIL/AGAR DIL: CPT | Performed by: UROLOGY

## 2017-02-10 PROCEDURE — 83605 ASSAY OF LACTIC ACID: CPT | Performed by: INTERNAL MEDICINE

## 2017-02-10 PROCEDURE — 80053 COMPREHEN METABOLIC PANEL: CPT | Performed by: INTERNAL MEDICINE

## 2017-02-10 PROCEDURE — 99239 HOSP IP/OBS DSCHRG MGMT >30: CPT | Performed by: NURSE PRACTITIONER

## 2017-02-10 PROCEDURE — 85025 COMPLETE CBC W/AUTO DIFF WBC: CPT | Performed by: INTERNAL MEDICINE

## 2017-02-10 PROCEDURE — 25010000002 LEVOFLOXACIN PER 250 MG: Performed by: HOSPITALIST

## 2017-02-10 RX ORDER — MULTIPLE VITAMINS W/ MINERALS TAB 9MG-400MCG
1 TAB ORAL DAILY
Status: DISCONTINUED | OUTPATIENT
Start: 2017-02-10 | End: 2017-02-10 | Stop reason: HOSPADM

## 2017-02-10 RX ORDER — FLUCONAZOLE 100 MG/1
200 TABLET ORAL EVERY 24 HOURS
Status: DISCONTINUED | OUTPATIENT
Start: 2017-02-10 | End: 2017-02-10 | Stop reason: HOSPADM

## 2017-02-10 RX ORDER — MULTIPLE VITAMINS W/ MINERALS TAB 9MG-400MCG
1 TAB ORAL DAILY
Qty: 30 EACH | Refills: 0 | Status: SHIPPED | OUTPATIENT
Start: 2017-02-10

## 2017-02-10 RX ORDER — FLUCONAZOLE 200 MG/1
200 TABLET ORAL EVERY 24 HOURS
Qty: 13 TABLET | Refills: 0 | Status: SHIPPED | OUTPATIENT
Start: 2017-02-11 | End: 2017-02-24

## 2017-02-10 RX ORDER — IPRATROPIUM BROMIDE AND ALBUTEROL SULFATE 2.5; .5 MG/3ML; MG/3ML
3 SOLUTION RESPIRATORY (INHALATION)
Qty: 180 VIAL | Refills: 0 | Status: SHIPPED | OUTPATIENT
Start: 2017-02-10

## 2017-02-10 RX ADMIN — FLUCONAZOLE 200 MG: 100 TABLET ORAL at 12:28

## 2017-02-10 RX ADMIN — Medication 1 CAPSULE: at 08:42

## 2017-02-10 RX ADMIN — MICONAZOLE NITRATE: 2 POWDER TOPICAL at 08:43

## 2017-02-10 RX ADMIN — IPRATROPIUM BROMIDE AND ALBUTEROL SULFATE 3 ML: .5; 3 SOLUTION RESPIRATORY (INHALATION) at 07:28

## 2017-02-10 RX ADMIN — LEVOFLOXACIN 250 MG: 5 INJECTION, SOLUTION INTRAVENOUS at 01:39

## 2017-02-10 RX ADMIN — BUDESONIDE 0.5 MG: 0.5 SUSPENSION RESPIRATORY (INHALATION) at 07:28

## 2017-02-10 RX ADMIN — FAMOTIDINE 20 MG: 20 TABLET, FILM COATED ORAL at 08:42

## 2017-02-10 RX ADMIN — MULTIPLE VITAMINS W/ MINERALS TAB 1 TABLET: TAB at 08:42

## 2017-02-10 RX ADMIN — SODIUM CHLORIDE, POTASSIUM CHLORIDE, SODIUM LACTATE AND CALCIUM CHLORIDE 100 ML/HR: 600; 310; 30; 20 INJECTION, SOLUTION INTRAVENOUS at 02:45

## 2017-02-10 RX ADMIN — GUAIFENESIN 1200 MG: 600 TABLET, EXTENDED RELEASE ORAL at 08:42

## 2017-02-10 RX ADMIN — IPRATROPIUM BROMIDE AND ALBUTEROL SULFATE 3 ML: .5; 3 SOLUTION RESPIRATORY (INHALATION) at 11:31

## 2017-02-10 NOTE — PLAN OF CARE
Problem: Patient Care Overview (Adult)  Goal: Plan of Care Review  Outcome: Ongoing (interventions implemented as appropriate)    02/09/17 2321   Coping/Psychosocial Response Interventions   Plan Of Care Reviewed With patient   Patient Care Overview   Progress improving       Goal: Adult Individualization and Mutuality  Outcome: Ongoing (interventions implemented as appropriate)    Problem: Pneumonia (Adult)  Intervention: Maximize Oxygenation/Ventilation/Perfusion    02/09/17 2321   Promote Aggressive Pulmonary Hygiene/Secretion Management   Cough And Deep Breathing done independently per patient   Respiratory Interventions   Airway/Ventilation Management airway patency maintained;pulmonary hygiene promoted   Positioning   Head Of Bed (HOB) Position HOB elevated   Activity   Activity Type activity adjusted per tolerance

## 2017-02-10 NOTE — NURSING NOTE
spoke with Asia Kim, if patient is ready to go back to facility over weekend the van is not available. please fax dc summary to [phone] and call unit A. will continue to follow.      spoke with Asia Kim r/t discharge back to facility today. patient will be going by ambulance. will continue to follow.

## 2017-02-10 NOTE — DISCHARGE SUMMARY
Chance Bucioiday  1935  [id number]    Hospitalists Discharge Summary    Date of Admission: 2/7/2017  Date of Discharge:  2/10/2017    Primary Discharge Diagnoses:   1. Toxic metabolic encephalopathy   2. Sepsis secondary   3. Candida UTI  4. Acute hypoxic respiratory failure secondary to acute bronchitis  Secondary Discharge Diagnoses:   5. DM2  6. Acute kidney injury/dehydration  7. Hypertension  8. CAD/Hyperlipidemia  9. Indeterminate troponin  10. H/O CHF  11. GERD  12. H/O DVT/TIA  13. H/O DEYSI  PCP  Patient Care Team:  Thomas Amos MD as PCP - General  Thomas Amos MD as PCP - Family Medicine    Consults:   Consults     Date and Time Order Name Status Description    2/9/2017 1914 Inpatient Consult to Urology      2/7/2017 1237 Hospitalist (on-call MD unless specified) Completed     1/22/2017 0854 Inpatient Consult to Pulmonology Completed         Operations and Procedures Performed:     Xr Chest 2 View    Result Date: 2/7/2017  Narrative: CHEST X-RAY, 02/07/2017:  HISTORY: 81-year-old male in the ED with 2-3 day history of shortness of air, cough and fever.  TECHNIQUE: AP and lateral upright chest series.  FINDINGS: The lungs are expanded and clear. Heart size and pulmonary vascularity are normal. No visible pulmonary infiltrate or pleural effusion. No change since 01/21/2017. Advanced chronic arthropathy both shoulders.      Impression: No active disease in the chest. No change since 01/21/2017.  This report was finalized on 2/7/2017 12:08 PM by Dr. Josesito Michel MD.      Xr Knee 1 Or 2 View Right    Result Date: 1/27/2017  Narrative: 2 RADIOGRAPHIC VIEWS OF THE RIGHT KNEE  CLINICAL HISTORY:  Right knee pain.  2 radiographic views of the right knee demonstrate severe tricompartmental osteoarthritic change. There is joint space narrowing, subchondral sclerosis, and marginal osteophyte formation. There are findings consistent with a joint effusion with added density in the region of the suprapatellar  bursa. Otherwise, no acute abnormality is appreciated. Atherosclerotic changes are incidentally noted within the arterial vasculature.  This report was finalized on 1/27/2017 7:20 PM by Dr. Reji Soto MD.      Ct Abdomen Pelvis With Contrast    Result Date: 1/22/2017  Narrative: Exam: CT of the abdomen and pelvis with IV contrast media.  HISTORY: Nursing home patient with elevated blood sugar distended abdomen and urinary retention. Elevated white blood cell count and trace microhematuria.  COMPARISON: 01/05/2017  TECHNIQUE: Axial imaging of the abdomen and pelvis was performed with IV contrast media.  FINDINGS: There is underlying emphysematous lung disease and chronic bronchiectasis. There is a new infiltrate in the right lower lobe. There are advanced atherosclerotic calcifications in the coronary arteries. Small hiatal hernia is present. Liver gallbladder spleen and adrenal glands are normal. Pancreas is unremarkable. The right and left kidney are normal. There are dense atherosclerotic calcifications throughout the aorta and iliac systems area Perry catheter is present in the bladder. Subcutaneous soft tissue density and air is seen in the anterior abdominal wall likely reflecting injections area Perry catheter is in the bladder with prostate enlargement. There is a large fecal ball within the rectal vault and there is colonic distention proximally consistent with impaction small bowel is nondilated. No free air intra-abdominally. There are advanced degenerative changes throughout the lumbar spine. There is a T12 compression fracture with progression since 01/05/2017.      Impression: Fecal impaction with colonic distention. Right lower lobe infiltrate consistent with pneumonia. Interval progression of the patient's T12 compression fracture.  This report was finalized on 1/22/2017 8:14 AM by Dr. Carlos Alberto Hylton MD.      Fl Video Swallow With Speech    Result Date: 1/24/2017  Narrative: INDICATION: Recent  pneumonia and aspiration.  FINDINGS: Fluoroscopic guidance for speech pathology swallow study. The patient was given various substances different thicknesses. No evidence of laryngeal penetration or aspiration. Please refer to the speech pathology report for full details. Fluoroscopic time 2. 35 minutes. One image acquired.  This report was finalized on 1/24/2017 4:03 PM by Dr. Mason Barrios MD.      Xr Chest 1 View    Result Date: 1/22/2017  Narrative: Exam: AP portable chest.  HISTORY: Nonverbal nursing home patient with altered mental status and low back pain for 4 days. Recent fall.  FINDINGS: An AP view of the chest is obtained. Cardiac size remains normal. Chronic pulmonary parenchymal scarring right upper lobe. No acute findings.      Impression: Negative portable chest. No acute findings. Signed report  This report was finalized on 1/22/2017 8:35 AM by Dr. Carlos Alberto Hylton MD.      Allergies:  is allergic to flomax [tamsulosin hcl]; penicillins; pravachol [pravastatin]; and prevacid [lansoprazole].    Pablo  Tylenol #3 noted 3/2016    Discharge Medications:   Chance Bocanegra   Home Medication Instructions ADRIA:[id number]    Printed on:02/10/17 1213   Medication Information                      acetaminophen (TYLENOL) 500 MG tablet  Take 500 mg by mouth Every 4 (Four) Hours As Needed for mild pain (1-3) or fever.             baclofen (LIORESAL) 10 MG tablet  Take 10 mg by mouth 3 (Three) Times a Day.             Benzalkonium Chloride (REMEDY ANTIMICROBIAL CLEANSER EX)  Apply 1 application topically Daily.             bisacodyl (DULCOLAX) 10 MG suppository  Insert 10 mg into the rectum Daily As Needed for constipation.             budesonide (PULMICORT) 0.5 MG/2ML nebulizer solution  Take 0.5 mg by nebulization 2 (Two) Times a Day.             bumetanide (BUMEX) 1 MG tablet  Take 1 mg by mouth 2 (Two) Times a Day. Patient takes 2 mg in the morning at 0800; 1 mg in the afternoon at 1400             dimethicone  (REMEDY NUTRASHIELD) 1 % cream  Apply 1 application topically 2 (Two) Times a Day.             Dimethicone (REMEDY SKIN REPAIR) 1.5 % cream  Apply  topically Daily.             docusate sodium 100 MG capsule  Take 100 mg by mouth 2 (Two) Times a Day.             famotidine (PEPCID) 20 MG tablet  Take 20 mg by mouth 2 (Two) Times a Day.             ferrous sulfate 220 (44 FE) MG/5ML solution  Take 325.6 mg by mouth Daily.             fluconazole (DIFLUCAN) 200 MG tablet  Take 1 tablet by mouth Daily for 13 days. Indications: Urinary Tract Infection             fluticasone (FLONASE) 50 MCG/ACT nasal spray  2 sprays into each nostril Daily.             guaiFENesin (MUCINEX) 600 MG 12 hr tablet  Take 600 mg by mouth 2 (Two) Times a Day.             insulin aspart (novoLOG) 100 UNIT/ML injection  Inject 0-7 Units under the skin 4 (Four) Times a Day Before Meals & at Bedtime.             insulin detemir (LEVEMIR) 100 UNIT/ML injection  Inject 10 Units under the skin Every Night.             ipratropium-albuterol (DUO-NEB) 0.5-2.5 mg/mL nebulizer  Take 3 mL by nebulization Every 4 (Four) Hours While Awake.             isosorbide dinitrate (ISORDIL) 10 MG tablet  Take 10 mg by mouth 3 (Three) Times a Day.             Lactobacillus (FLORANEX) pack oral packet  Take 1 packet by mouth 2 (Two) Times a Day.             lactulose (CHRONULAC) 10 GM/15ML solution  Take 20 g by mouth Daily As Needed (constipation).             Multiple Vitamins-Minerals (MULTIVITAMIN WITH MINERALS) tablet tablet  Take 1 tablet by mouth Daily.             nitroglycerin (NITROSTAT) 0.4 MG SL tablet  Place 1 tablet under the tongue Every 5 (Five) Minutes As Needed for chest pain. Take no more than 3 doses in 15 minutes.             Nystatin powder  2 (Two) Times a Day As Needed (to groin/erasto area incontinent episodes).             potassium chloride (KAYCIEL) 20 MEQ/15ML (10%) solution  Take 20 mEq by mouth Daily.             Sennosides (SENNA) 8.8  "MG/5ML syrup  Take 5 mL by mouth Every Night.             Sodium Phosphates (FLEET ENEMA) 7-19 GM/118ML enema  Insert  into the rectum Daily As Needed (constipation).             vitamin D (ERGOCALCIFEROL) 30731 UNITS capsule capsule  Take 50,000 Units by mouth Every 7 (Seven) Days. On Friday             zinc oxide 20 % ointment  Apply  topically 3 (Three) Times a Day. & prn incontinent episodes               History of Present Illness:   81 YOM presented through ER from [facility] secondary to concerns with fever/noisy breathing/hypotension. All information is obtained from the chart as the patient is only able to state his last name and birthdate in a whispering voice and appears quite confused. Per ER report, he c/o right knee pain with OA.     On exam the patient is grabbing call light pressing buttons, looking around. He does not answer questions other than name and birthdate. He shakes his head \"no\" to having any pain/soa/n/v/d or other concerns. No other information is available at the time of this interview.    Hospital Course   1. Toxic metabolic encephalopathy with 2. Sepsis secondary to 3. Candida UTI:  Received IV hydration, IV Levaquin and initial Vancomycin dose prior to MRSA result negative  He is felt to be at baseline mentation  A wound culture from material obtained from penis is still pending and will need to be followed by Thomas Amos MD   Urine culture + candida which may be colonization, however with change in mentation, and presentation, will treat.  Catheter replaced today with recommendation of recheck urine after completion of fluconazole  Discharge on Fluconazole 200 mg po qd x 14 days (hold simvastatin while on this then resume)  F/U Urology 1-2 weeks    4. Acute hypoxic respiratory failure secondary to acute bronchitis:   Recent admit for RLL PNA and MRSA/sepsis-ended levaquin 5 days ago  MRSA nares prelim negative (previously positive and treated with zyvox)  Sputum culture not obtained " secondary to no sputum production  CXR negative for acute findings  Resp viral panel negative  Past SLP eval demonstrates need for continued aspiration precautions and dietary texture modifications which have been continued   He is off oxygen and continues to cough while eating   1/24/17 fluoroscopic eval done without evidence of aspiration  He has been on IV Levaquin which will be discontinued today  Given that he is afebrile/WBCC normal/procal .11 (discouraged treatment range), feel it is in his best interest not to continue antibiotics at this time  Continue duonebs/budesonide nebs/guaifenesin/IS/acapella  F/U Thomas Amos MD     5. DM2: A1C 9.5%  Glucose low on and off and levemir was adjusted down to 10 units hs  Continue to hold januvia  Continue accuchecks ac/hs and low dose SSI   F/U Thomas Amos MD     6. Acute kidney injury/dehydration: resolved with IV hydration  Hypernatremia resolved with LR IVFs given  Creatinine 0.74 today  Continue to encourage oral hydration  F/U Thomas Amos MD    7. Hypertension: remained at or low goal without medications noted on home med rec  monitor    8. CAD/Hyperlipidemia: hold statin while on fluconazole, then OK to resume per Thomas Amos MD  Resume home imdur    9. Indeterminate troponin: resolved with hydration, likely secondary to janine and uti  EKG read by Dr. Bowling showed no significant change from previous  No chest pain concerns  No further issues    10. H/O CHF: no acute issues here    11. GERD: continue home famotidine bid    12. H/O DVT/TIA: no acute issues here however noted to be on therapeutic lovenox dosing from nursing facility. Received prophylactic dose lovenox while here  F/U Thomas Amos MD regarding need to resume therapeutic dosing-not continued at discharge    13. H/O DEYSI: no acute issue here, Hgb 11.5 today, monitor at intervals per Thomas Amos MD    Last Lab Results:   Lab Results (most recent)     Procedure Component Value Units Date/Time    CBC &  Differential [[id number]] Collected:  02/07/17 1048    Specimen:  Blood Updated:  02/07/17 1107    Narrative:       The following orders were created for panel order CBC & Differential.  Procedure                               Abnormality         Status                     ---------                               -----------         ------                     CBC Auto Differential[[id number]]         Abnormal            Final result                 Please view results for these tests on the individual orders.    CBC Auto Differential [[id number]]  (Abnormal) Collected:  02/07/17 1048    Specimen:  Blood Updated:  02/07/17 1107     WBC 14.63 (H) 10*3/mm3      RBC 5.54 10*6/mm3      Hemoglobin 15.8 g/dL      Hematocrit 50.9 %      MCV 91.9 fL      MCH 28.5 pg      MCHC 31.0 g/dL      RDW 14.0 %      RDW-SD 46.7 fl      MPV 10.6 (H) fL      Platelets 286 10*3/mm3      Neutrophil % 79.3 (H) %      Lymphocyte % 13.3 (L) %      Monocyte % 6.3 %      Eosinophil % 0.4 %      Basophil % 0.3 %      Immature Grans % 0.4 %      Neutrophils, Absolute 11.59 (H) 10*3/mm3      Lymphocytes, Absolute 1.95 10*3/mm3      Monocytes, Absolute 0.92 10*3/mm3      Eosinophils, Absolute 0.06 (L) 10*3/mm3      Basophils, Absolute 0.05 10*3/mm3      Immature Grans, Absolute 0.06 (H) 10*3/mm3      nRBC 0.0 /100 WBC     Urinalysis With / Culture If Indicated [[id number]]  (Abnormal) Collected:  02/07/17 1051    Specimen:  Urine from Urine, Clean Catch Updated:  02/07/17 1113     Color, UA Yellow      Appearance, UA Clear      pH, UA 6.0      Specific Gravity, UA 1.015      Glucose, UA Negative      Ketones, UA Negative      Bilirubin, UA Negative      Blood, UA Moderate (2+) (A)      Protein, UA 30 mg/dL (1+) (A)      Leuk Esterase, UA Negative      Nitrite, UA Negative      Urobilinogen, UA 0.2 E.U./dL     Lactic Acid, Plasma [[id number]]  (Normal) Collected:  02/07/17 1048    Specimen:  Blood Updated:  02/07/17 1125     Lactate 1.8 mmol/L     Urinalysis,  Microscopic Only [[id number]]  (Abnormal) Collected:  02/07/17 1051    Specimen:  Urine from Urine, Clean Catch Updated:  02/07/17 1137     RBC, UA None Seen /HPF      WBC, UA 0-2 (A) /HPF      Bacteria, UA Trace (A) /HPF      Squamous Epithelial Cells, UA 0-2 /HPF      Yeast, UA  /HPF      Moderate/2+ Budding Yeast w/Hyphae     Hyaline Casts, UA None Seen /LPF      Methodology Manual Light Microscopy     Protime-INR [[id number]]  (Abnormal) Collected:  02/07/17 1048    Specimen:  Blood Updated:  02/07/17 1148     Protime 15.2 (H) Seconds      INR 1.19 (H)     Narrative:       Therapeutic Ranges for INR: 2.0-3.0 (PT 20-30)                              2.5-3.5 (PT 25-34)    aPTT [[id number]]  (Normal) Collected:  02/07/17 1048    Specimen:  Blood Updated:  02/07/17 1148     PTT 33.7 seconds     Narrative:       PTT = The equivalent PTT values for the therapeutic range of heparin levels at 0.1 to 0.7 U/ml are 53 to 110 seconds.    Troponin [[id number]]  (Abnormal) Collected:  02/07/17 1048    Specimen:  Blood Updated:  02/07/17 1152     Troponin T 0.036 (H) ng/mL     Narrative:       Troponin T Reference Ranges:  Less than 0.03 ng/mL:    Negative for AMI  0.03 to 0.09 ng/mL:      Indeterminant for AMI  Greater than 0.09 ng/mL: Positive for AMI    Comprehensive Metabolic Panel [[id number]]  (Abnormal) Collected:  02/07/17 1048    Specimen:  Blood Updated:  02/07/17 1154     Glucose 249 (H) mg/dL      BUN 33 (H) mg/dL      Creatinine 1.58 (H) mg/dL      Sodium 154 (H) mmol/L      Potassium 4.0 mmol/L      Chloride 107 mmol/L      CO2 32.8 (H) mmol/L      Calcium 9.5 mg/dL      Total Protein 8.5 g/dL      Albumin 3.60 g/dL      ALT (SGPT) 13 U/L      AST (SGOT) 19 U/L      Alkaline Phosphatase 122 U/L      Total Bilirubin 0.7 mg/dL      eGFR Non African Amer 42 (L) mL/min/1.73      Globulin 4.9 gm/dL      A/G Ratio 0.7 g/dL      BUN/Creatinine Ratio 20.9      Anion Gap 14.2 mmol/L     Narrative:       The MDRD GFR formula is only  "valid for adults with stable renal function between ages 18 and 70.    BNP [[id number]]  (Normal) Collected:  02/07/17 1048    Specimen:  Blood Updated:  02/07/17 1203     proBNP 315.7 pg/mL     Narrative:       Among patients with dyspnea, NT-proBNP is highly sensitive for the detection of acute congestive heart failure. In addition NT-proBNP of <300 pg/ml effectively rules out acute congestive heart failure with 99% negative predictive value.    Hemoglobin A1c [[id number]]  (Abnormal) Collected:  02/07/17 1545    Specimen:  Blood Updated:  02/07/17 1604     Hemoglobin A1C 9.50 (H) %     Narrative:       Hemoglobin A1C Ranges:    Increased Risk for Diabetes  5.7% to 6.4%  Diabetes                     >= 6.5%  Diabetic Goal                < 7.0%    TSH [[id number]]  (Normal) Collected:  02/07/17 1048    Specimen:  Blood Updated:  02/07/17 1614     TSH 3.390 mIU/mL     POC Glucose Fingerstick [[id number]]  (Abnormal) Collected:  02/07/17 1659    Specimen:  Blood Updated:  02/07/17 1707     Glucose 159 (H) mg/dL     Narrative:       Meter: [id number] : [id number] Ofelia BAILEY    Procalcitonin [[id number]]  (Normal) Collected:  02/07/17 1545    Specimen:  Blood Updated:  02/07/17 2144     Procalcitonin 0.11 ng/mL     Narrative:       As a Marker for Sepsis (Non-Neonates):   1. <0.5 ng/mL represents a low risk of severe sepsis and/or septic shock.  1. >2 ng/mL represents a high risk of severe sepsis and/or septic shock.    As a Marker for Lower Respiratory Tract Infections that require antibiotic therapy:  PCT on Admission     Antibiotic Therapy             6-12 Hrs later  > 0.5                Strongly Recommended            >0.25 - <0.5         Recommended  0.1 - 0.25           Discouraged                   Remeasure/reassess PCT  <0.1                 Strongly Discouraged          Remeasure/reassess PCT      As 28 day mortality risk marker: \"Change in Procalcitonin Result\" (> 80 % or <=80 %) if Day 0 (or Day 1) and Day 4 " values are available. Refer to http://www.[facility]-pct-calculator.com/   Change in PCT <=80 %   A decrease of PCT levels below or equal to 80 % defines a positive change in PCT test result representing a higher risk for 28-day all-cause mortality of patients diagnosed with severe sepsis or septic shock.  Change in PCT > 80 %   A decrease of PCT levels of more than 80 % defines a negative change in PCT result representing a lower risk for 28-day all-cause mortality of patients diagnosed with severe sepsis or septic shock.                POC Glucose Fingerstick [[id number]]  (Abnormal) Collected:  02/07/17 2154    Specimen:  Blood Updated:  02/07/17 2200     Glucose 286 (H) mg/dL     Narrative:       Meter: [id number] : [id number] Mani Hunter    Respiratory Panel, PCR [[id number]]  (Normal) Collected:  02/07/17 1620    Specimen:  Swab from Nasopharynx Updated:  02/07/17 2303     ADENOVIRUS, PCR Not Detected      Coronavirus 229E Not Detected      Coronavirus HKU1 Not Detected      Coronavirus NL63 Not Detected      Coronavirus OC43 Not Detected      Human Metapneumovirus Not Detected      Human Rhinovirus/Enterovirus Not Detected      Influenza B PCR Not Detected      Parainfluenza Virus 1 Not Detected      Parainfluenza Virus 2 Not Detected      Parainfluenza Virus 3 Not Detected      Parainfluenza Virus 4 Not Detected      Bordetella pertussis pcr Not Detected      Influenza 2009 H1N1 by PCR Not Detected      Chlamydophila pneumoniae PCR Not Detected      Mycoplasma pneumo by PCR Not Detected      Influenza A PCR Not Detected      Influenza A H3 Not Detected      Influenza A H1 Not Detected      RSV, PCR Not Detected     CBC & Differential [[id number]] Collected:  02/08/17 0418    Specimen:  Blood Updated:  02/08/17 0532    Narrative:       The following orders were created for panel order CBC & Differential.  Procedure                               Abnormality         Status                     ---------                                -----------         ------                     CBC Auto Differential[[id number]]         Abnormal            Final result                 Please view results for these tests on the individual orders.    CBC Auto Differential [[id number]]  (Abnormal) Collected:  02/08/17 0418    Specimen:  Blood Updated:  02/08/17 0532     WBC 13.29 (H) 10*3/mm3      RBC 4.87 10*6/mm3      Hemoglobin 13.9 (L) g/dL      Hematocrit 45.9 %      MCV 94.3 (H) fL      MCH 28.5 pg      MCHC 30.3 (L) g/dL      RDW 13.9 %      RDW-SD 47.7 fl      MPV 11.0 (H) fL      Platelets 206 10*3/mm3      Neutrophil % 82.4 (H) %      Lymphocyte % 12.0 (L) %      Monocyte % 4.3 %      Eosinophil % 0.5 %      Basophil % 0.2 %      Immature Grans % 0.6 (H) %      Neutrophils, Absolute 10.95 (H) 10*3/mm3      Lymphocytes, Absolute 1.60 10*3/mm3      Monocytes, Absolute 0.57 10*3/mm3      Eosinophils, Absolute 0.06 (L) 10*3/mm3      Basophils, Absolute 0.03 10*3/mm3      Immature Grans, Absolute 0.08 (H) 10*3/mm3      nRBC 0.0 /100 WBC     Troponin [[id number]]  (Normal) Collected:  02/08/17 0418    Specimen:  Blood Updated:  02/08/17 0533     Troponin T 0.022 ng/mL     Narrative:       Troponin T Reference Ranges:  Less than 0.03 ng/mL:    Negative for AMI  0.03 to 0.09 ng/mL:      Indeterminant for AMI  Greater than 0.09 ng/mL: Positive for AMI    Basic Metabolic Panel [[id number]]  (Abnormal) Collected:  02/08/17 0418    Specimen:  Blood Updated:  02/08/17 0543     Glucose 113 (H) mg/dL      BUN 28 (H) mg/dL      Creatinine 1.13 mg/dL      Sodium 157 (H) mmol/L      Potassium 3.3 (L) mmol/L      Chloride 112 (H) mmol/L      CO2 32.6 (H) mmol/L      Calcium 8.9 mg/dL      eGFR Non African Amer 62 mL/min/1.73      BUN/Creatinine Ratio 24.8      Anion Gap 12.4 mmol/L     Narrative:       The MDRD GFR formula is only valid for adults with stable renal function between ages 18 and 70.    POC Glucose Fingerstick [[id number]]  (Abnormal) Collected:   "02/08/17 0731    Specimen:  Blood Updated:  02/08/17 0747     Glucose 48 (C) mg/dL     Narrative:       Repeat Test Meter: [id number] : [id number] Shukri Watkins    POC Glucose Fingerstick [[id number]]  (Abnormal) Collected:  02/08/17 0733    Specimen:  Blood Updated:  02/08/17 0747     Glucose 50 (L) mg/dL     Narrative:       Meter: [id number] : [id number] Shukri Watkins    Procalcitonin [[id number]]  (Normal) Collected:  02/08/17 0418    Specimen:  Blood Updated:  02/08/17 0756     Procalcitonin 0.11 ng/mL     Narrative:       As a Marker for Sepsis (Non-Neonates):   1. <0.5 ng/mL represents a low risk of severe sepsis and/or septic shock.  1. >2 ng/mL represents a high risk of severe sepsis and/or septic shock.    As a Marker for Lower Respiratory Tract Infections that require antibiotic therapy:  PCT on Admission     Antibiotic Therapy             6-12 Hrs later  > 0.5                Strongly Recommended            >0.25 - <0.5         Recommended  0.1 - 0.25           Discouraged                   Remeasure/reassess PCT  <0.1                 Strongly Discouraged          Remeasure/reassess PCT      As 28 day mortality risk marker: \"Change in Procalcitonin Result\" (> 80 % or <=80 %) if Day 0 (or Day 1) and Day 4 values are available. Refer to http://www.[employer]-pct-calculator.com/   Change in PCT <=80 %   A decrease of PCT levels below or equal to 80 % defines a positive change in PCT test result representing a higher risk for 28-day all-cause mortality of patients diagnosed with severe sepsis or septic shock.  Change in PCT > 80 %   A decrease of PCT levels of more than 80 % defines a negative change in PCT result representing a lower risk for 28-day all-cause mortality of patients diagnosed with severe sepsis or septic shock.                POC Glucose Fingerstick [[id number]]  (Normal) Collected:  02/08/17 0817    Specimen:  Blood Updated:  02/08/17 0824     Glucose 79 mg/dL     Narrative:       Meter: " [id number] : [id number] Shukri Watkins    POC Glucose Fingerstick [[id number]]  (Abnormal) Collected:  02/08/17 1146    Specimen:  Blood Updated:  02/08/17 1157     Glucose 329 (H) mg/dL     Narrative:       Meter: [id number] : [id number] [employer] Divine Fan    POC Glucose Fingerstick [[id number]]  (Abnormal) Collected:  02/08/17 1149    Specimen:  Blood Updated:  02/08/17 1158     Glucose 320 (H) mg/dL     Narrative:       Meter: [id number] : [id number] [employer] [facility]    POC Glucose Fingerstick [[id number]]  (Abnormal) Collected:  02/08/17 1652    Specimen:  Blood Updated:  02/08/17 1712     Glucose 254 (H) mg/dL     Narrative:       Meter: [id number] : [id number] Ofelia BAILEY    Urinalysis With / Culture If Indicated [[id number]]  (Abnormal) Collected:  02/08/17 1912    Specimen:  Urine from Urine, Clean Catch Updated:  02/08/17 1915     Color, UA Yellow      Appearance, UA Slightly Cloudy (A)      pH, UA 6.5      Specific Gravity, UA 1.020      Glucose,  mg/dL (2+) (A)      Ketones, UA Negative      Bilirubin, UA Negative      Blood, UA Large (3+) (A)      Protein,  mg/dL (2+) (A)      Leuk Esterase, UA Small (1+) (A)      Nitrite, UA Negative      Urobilinogen, UA 0.2 E.U./dL     Urinalysis, Microscopic Only [[id number]]  (Abnormal) Collected:  02/08/17 1912    Specimen:  Urine from Urine, Clean Catch Updated:  02/08/17 1921     RBC, UA 13-20 (A) /HPF      WBC, UA 31-50 (A) /HPF      Bacteria, UA 1+ (A) /HPF      Squamous Epithelial Cells, UA 0-2 /HPF      Yeast, UA  /HPF      Large/3+ Budding Yeast w/Hyphae     Hyaline Casts, UA None Seen /LPF      Methodology Manual Light Microscopy     POC Glucose Fingerstick [[id number]]  (Abnormal) Collected:  02/08/17 2027    Specimen:  Blood Updated:  02/08/17 2033     Glucose 212 (H) mg/dL     Narrative:       Meter: [id number] : [id number] Nestor Beckford    CBC & Differential [[id number]] Collected:  02/09/17 0559    Specimen:  Blood Updated:   02/09/17 0609    Narrative:       The following orders were created for panel order CBC & Differential.  Procedure                               Abnormality         Status                     ---------                               -----------         ------                     CBC Auto Differential[[id number]]         Abnormal            Final result                 Please view results for these tests on the individual orders.    CBC Auto Differential [[id number]]  (Abnormal) Collected:  02/09/17 0559    Specimen:  Blood Updated:  02/09/17 0609     WBC 14.38 (H) 10*3/mm3      RBC 4.19 (L) 10*6/mm3      Hemoglobin 12.1 (L) g/dL      Hematocrit 39.2 (L) %      MCV 93.6 fL      MCH 28.9 pg      MCHC 30.9 (L) g/dL      RDW 13.9 %      RDW-SD 46.9 fl      MPV 10.7 (H) fL      Platelets 181 10*3/mm3      Neutrophil % 87.0 (H) %      Lymphocyte % 7.0 (L) %      Monocyte % 4.3 %      Eosinophil % 1.1 %      Basophil % 0.2 %      Immature Grans % 0.4 %      Neutrophils, Absolute 12.50 (H) 10*3/mm3      Lymphocytes, Absolute 1.01 10*3/mm3      Monocytes, Absolute 0.62 10*3/mm3      Eosinophils, Absolute 0.16 10*3/mm3      Basophils, Absolute 0.03 10*3/mm3      Immature Grans, Absolute 0.06 (H) 10*3/mm3      nRBC 0.0 /100 WBC     Lactic Acid, Plasma [[id number]]  (Normal) Collected:  02/09/17 0559    Specimen:  Blood Updated:  02/09/17 0620     Lactate 0.8 mmol/L     Comprehensive Metabolic Panel [[id number]]  (Abnormal) Collected:  02/09/17 0559    Specimen:  Blood Updated:  02/09/17 0713     Glucose 48 (C) mg/dL      BUN 17 mg/dL      Creatinine 0.81 mg/dL      Sodium 149 (H) mmol/L      Potassium 3.6 mmol/L      Chloride 111 (H) mmol/L      CO2 30.2 (H) mmol/L      Calcium 8.5 (L) mg/dL      Total Protein 6.2 g/dL      Albumin 2.40 (L) g/dL      ALT (SGPT) 8 U/L      AST (SGOT) 16 U/L      Alkaline Phosphatase 83 U/L      Total Bilirubin 0.5 mg/dL      eGFR Non African Amer 91 mL/min/1.73      Globulin 3.8 gm/dL      A/G Ratio 0.6  g/dL      BUN/Creatinine Ratio 21.0      Anion Gap 7.8 mmol/L     Narrative:       The MDRD GFR formula is only valid for adults with stable renal function between ages 18 and 70.    Urine Culture [[id number]]  (Abnormal) Collected:  02/07/17 1051    Specimen:  Urine from Urine, Clean Catch Updated:  02/09/17 0734     Urine Culture >100,000 CFU/mL Candida albicans (A)     POC Glucose Fingerstick [[id number]]  (Abnormal) Collected:  02/09/17 0722    Specimen:  Blood Updated:  02/09/17 0735     Glucose 45 (C) mg/dL     Narrative:       Meter: [id number] : [id number] Mings Teresita PCA    POC Glucose Fingerstick [[id number]]  (Abnormal) Collected:  02/09/17 0816    Specimen:  Blood Updated:  02/09/17 0826     Glucose 47 (C) mg/dL     Narrative:       Meter: [id number] : [id number] Mings Teresita PCA    POC Glucose Fingerstick [[id number]]  (Abnormal) Collected:  02/09/17 0851    Specimen:  Blood Updated:  02/09/17 0913     Glucose 51 (L) mg/dL     Narrative:       Meter: [id number] : [id number] Lebron Ashley RN Validator    POC Glucose Fingerstick [[id number]]  (Normal) Collected:  02/09/17 0858    Specimen:  Blood Updated:  02/09/17 0913     Glucose 75 mg/dL     Narrative:       Meter: [id number] : [id number] Lebron Ashley RN Validator    Blood Culture [[id number]]  (Normal) Collected:  02/07/17 1108    Specimen:  Blood from Arm, Right Updated:  02/09/17 1201     Blood Culture No growth at 2 days     POC Glucose Fingerstick [[id number]]  (Abnormal) Collected:  02/09/17 1150    Specimen:  Blood Updated:  02/09/17 1215     Glucose 168 (H) mg/dL     Narrative:       Meter: [id number] : [id number] Mings Teresita PCA    MRSA Screen [[id number]]  (Normal) Collected:  02/07/17 2210    Specimen:  Swab from Nares Updated:  02/09/17 1435     MRSA SCREEN CX        No Methicillin Resistant Staphylococcus aureus Isolated at 24 hours    POC Glucose Fingerstick [[id number]]  (Abnormal) Collected:  02/09/17 1624    Specimen:  Blood Updated:   02/09/17 1638     Glucose 266 (H) mg/dL     Narrative:       Meter: [id number] : [id number] Ofelia BAILEY    POC Glucose Fingerstick [[id number]]  (Abnormal) Collected:  02/09/17 2059    Specimen:  Blood Updated:  02/09/17 2109     Glucose 288 (H) mg/dL     Narrative:       Meter: [id number] : [id number] Mecca Carlson    Troponin [[id number]]  (Normal) Collected:  02/09/17 2354    Specimen:  Blood Updated:  02/10/17 0024     Troponin T <0.010 ng/mL     Narrative:       Troponin T Reference Ranges:  Less than 0.03 ng/mL:    Negative for AMI  0.03 to 0.09 ng/mL:      Indeterminant for AMI  Greater than 0.09 ng/mL: Positive for AMI    CBC & Differential [[id number]] Collected:  02/10/17 0526    Specimen:  Blood Updated:  02/10/17 0606    Narrative:       The following orders were created for panel order CBC & Differential.  Procedure                               Abnormality         Status                     ---------                               -----------         ------                     CBC Auto Differential[[id number]]         Abnormal            Final result                 Please view results for these tests on the individual orders.    CBC Auto Differential [[id number]]  (Abnormal) Collected:  02/10/17 0526    Specimen:  Blood Updated:  02/10/17 0606     WBC 9.30 10*3/mm3      RBC 3.97 (L) 10*6/mm3      Hemoglobin 11.5 (L) g/dL      Hematocrit 37.2 (L) %      MCV 93.7 fL      MCH 29.0 pg      MCHC 30.9 (L) g/dL      RDW 13.7 %      RDW-SD 46.8 fl      MPV 11.7 (H) fL      Platelets 156 10*3/mm3      Neutrophil % 75.0 (H) %      Lymphocyte % 16.2 (L) %      Monocyte % 4.9 %      Eosinophil % 3.3 %      Basophil % 0.2 %      Immature Grans % 0.4 %      Neutrophils, Absolute 6.96 10*3/mm3      Lymphocytes, Absolute 1.51 10*3/mm3      Monocytes, Absolute 0.46 10*3/mm3      Eosinophils, Absolute 0.31 (H) 10*3/mm3      Basophils, Absolute 0.02 10*3/mm3      Immature Grans, Absolute 0.04 (H) 10*3/mm3      nRBC 0.0  /100 WBC     Lactic Acid, Plasma [[id number]]  (Normal) Collected:  02/10/17 0526    Specimen:  Blood Updated:  02/10/17 0615     Lactate 1.3 mmol/L     Comprehensive Metabolic Panel [[id number]]  (Abnormal) Collected:  02/10/17 0526    Specimen:  Blood Updated:  02/10/17 0630     Glucose 122 (H) mg/dL      BUN 11 mg/dL      Creatinine 0.74 (L) mg/dL      Sodium 145 mmol/L      Potassium 3.7 mmol/L      Chloride 108 (H) mmol/L      CO2 29.5 (H) mmol/L      Calcium 8.2 (L) mg/dL      Total Protein 5.8 (L) g/dL      Albumin 2.20 (L) g/dL      ALT (SGPT) 9 U/L      AST (SGOT) 16 U/L      Alkaline Phosphatase 80 U/L      Total Bilirubin 0.5 mg/dL      eGFR Non African Amer 102 mL/min/1.73      Globulin 3.6 gm/dL      A/G Ratio 0.6 g/dL      BUN/Creatinine Ratio 14.9      Anion Gap 7.5 mmol/L     Narrative:       The MDRD GFR formula is only valid for adults with stable renal function between ages 18 and 70.    Wound Culture [[id number]] Collected:  02/10/17 0636    Specimen:  Wound from Penis Updated:  02/10/17 0638    Urine Culture [[id number]]  (Abnormal) Collected:  02/08/17 1912    Specimen:  Urine from Urine, Clean Catch Updated:  02/10/17 0744     Urine Culture >100,000 CFU/mL Candida albicans (A)     POC Glucose Fingerstick [[id number]]  (Normal) Collected:  02/10/17 0756    Specimen:  Blood Updated:  02/10/17 0814     Glucose 117 mg/dL     Narrative:       Meter: [id number] : [id number] Naomi BAILEY Validator    Blood Culture [[id number]]  (Normal) Collected:  02/07/17 1048    Specimen:  Blood from Arm, Left Updated:  02/10/17 1101     Blood Culture No growth at 3 days         Imaging Results (most recent)     Procedure Component Value Units Date/Time    XR Chest 2 View [[id number]] Collected:  02/07/17 1206     Updated:  02/07/17 1210    Narrative:       CHEST X-RAY, 02/07/2017:     HISTORY:   81-year-old male in the ED with 2-3 day history of shortness of air,  cough and fever.     TECHNIQUE:  AP and lateral upright  chest series.     FINDINGS:  The lungs are expanded and clear. Heart size and pulmonary vascularity  are normal. No visible pulmonary infiltrate or pleural effusion. No  change since 01/21/2017. Advanced chronic arthropathy both shoulders.       Impression:       No active disease in the chest. No change since 01/21/2017.     This report was finalized on 2/7/2017 12:08 PM by Dr. Josesito Michel MD.           PROCEDURES: NONE    Condition on Discharge:  Stable    Physical Exam at Discharge  Vital Signs  Temp:  [97.2 °F (36.2 °C)-97.9 °F (36.6 °C)] 97.6 °F (36.4 °C)  Heart Rate:  [80-96] 96  Resp:  [18-22] 20  BP: (111-129)/(65-77) 129/71    Physical Exam:  Physical Exam   Constitutional: Patient appears well-developed and well-nourished and in no acute distress, elderly, coughing while eating with eggs in mouth   HEENT:   Head: Normocephalic and atraumatic.   Eyes:  Pupils are equal, round, and reactive to light. EOM are intact. Sclera are anicteric and non-injected.  Mouth and Throat: Patient has moist mucous membranes. Oropharynx is clear of any erythema or exudate.     Neck: Neck supple. No JVD present. No thyromegaly present. No lymphadenopathy present.  Cardiovascular: Regular rate, irregular rhythm, S1 normal and S2 normal.  Exam reveals no gallop and no friction rub.  No murmur heard.  Pulmonary/Chest: Lungs are clear to auscultation bilaterally, diminished bases. No respiratory distress. No wheezes.    Abdominal: Soft. Bowel sounds are normal. No distension and no mass. There is no hepatosplenomegaly. There is no tenderness.   : fuller catheter in place  Musculoskeletal: Normal Muscle tone  Extremities: No edema. Pulses are palpable in all 4 extremities.  Neurological: Patient is alert and oriented to person, place, and time. Cranial nerves II-XII are grossly intact with no focal deficits.  Skin: Skin is warm. No rash noted. Nails show no clubbing.  No cyanosis or erythema.    Discharge  Disposition  [place]    Visiting Nurse:    As per facility    Home PT/OT:  As per facility    Home Safety Evaluation:  As per facility    DME  As per facility    Discharge Diet:         Dietary Orders            Start     Ordered    02/09/17 1621  Calorie Count  (Calorie Count)  Until Discontinued     Comments:  Continue calorie count through 2-12-17   Question:  Duration of Calorie Count  Answer:  3 Days    02/09/17 1620    02/08/17 0843  Diet Pureed; [place] / Syrup Thick; Cardiac, Consistent Carbohydrate  Diet Effective Now     Question Answer Comment   Diet Texture / Consistency Pureed    Fluid Consistency [place] / Syrup Thick    Common Modifiers Cardiac    Common Modifiers Consistent Carbohydrate        02/08/17 0842    02/07/17 1800  Dietary Nutrition Supplement: Glucerna Shakes  Daily With Breakfast, Lunch & Dinner     Question:  Select Supplement:  Answer:  Glucerna Shakes    02/07/17 1620        Activity at Discharge:  As tolerated    Pre-discharge education  Diabetic, Cardiac, wound care, injectables, follow up, medications    Follow-up Appointments  No future appointments.  Referrals and Follow-ups to Schedule     Additional Follow-Up    As directed    Urology   Follow Up Details:  1-2 weeks       Follow-Up    As directed    Thomas Amos MD   Follow Up Details:  1 week               Test Results Pending at Discharge   Order Current Status    Wound Culture In process    Blood Culture Preliminary result    Blood Culture Preliminary result    MRSA Screen Preliminary result         Fabienne Melendez, MAURICE  02/10/17  12:13 PM    Time: Discharge over 30 min (if over 30 minutes give explanation as to why it took greater than 30 minutes)  Secondary to:  Review of diagnostics/labs/notes  Coordination of transport/care/follow up  Medication reconciliation

## 2017-02-10 NOTE — PLAN OF CARE
Problem: Patient Care Overview (Adult)  Goal: Plan of Care Review  Outcome: Ongoing (interventions implemented as appropriate)    02/10/17 0325   Coping/Psychosocial Response Interventions   Plan Of Care Reviewed With patient   Patient Care Overview   Progress no change   Outcome Evaluation   Outcome Summary/Follow up Plan continues with abt         Problem: Pneumonia (Adult)  Goal: Signs and Symptoms of Listed Potential Problems Will be Absent or Manageable (Pneumonia)  Outcome: Ongoing (interventions implemented as appropriate)    02/10/17 0325   Pneumonia   Problems Assessed (Pneumonia) all   Problems Present (Pneumonia) none         Problem: Pressure Ulcer (Adult)  Goal: Signs and Symptoms of Listed Potential Problems Will be Absent or Manageable (Pressure Ulcer)  Outcome: Ongoing (interventions implemented as appropriate)

## 2017-02-10 NOTE — PROGRESS NOTES
"    HOSPITALIST SERVICES  @ [place], [place]            HOSPITALIST TEAM   PROGRESS NOTE      Patient Care Team:  Thomas Amos MD as PCP - General  Thomas Amos MD as PCP - Family Medicine        Chief Complaint:        fever/noisy breathing      Subjective      Patient was admitted with acute respiratory failure with hypoxia, pneumonia and acute metabolic encephalopathy and leukocytosis, elevated Troponin and FREDY.      Interval History and ROS:     Patient States he is doing fine  Patient Complaints: No new complaints  Patient Denies:  Any sx of chest pain, shortness of breath  History taken from: Patient      Objective    Vital Signs  Temp:  [97.3 °F (36.3 °C)-97.9 °F (36.6 °C)] 97.8 °F (36.6 °C)  Heart Rate:  [] 80  Resp:  [18-22] 18  BP: (111-132)/(63-77) 129/77    Flowsheet Rows         First Filed Value    Admission Height  72\" (182.9 cm) Documented at 02/07/2017 1023    Admission Weight  145 lb (65.8 kg) Documented at 02/07/2017 1045              Physical Exam:      PHYSICAL EXAMINATION:    VITAL SIGNS: As per Nurse's notes    GENERAL APPEARANCE: The patient is a well developed, well nourished,  male, in no acute distress.    HEENT: Normocephalic, atraumatic. PERRL.     NECK: Supple and symmetric. No thyromegaly.     SKIN: Warm, dry and intact.     LUNGS: A few rales on auscultation.      CARDIOVASCULAR: RRR. S1, S2 normal without murmur/gallop/rub.     ABDOMEN: Soft, non-tender, non-distended. No masses.     EXTREMITIES:  No evidence of cyanosis, clubbing, or edema.     MUSCULOSKELETAL: Unremarkable. Muscle strength and tone normal.    PSYCHIATRY: No evidence of acute anxiety, depression, panic attacks or hallucinations.    MENTAL STATUS EXAMINATION: Patient is alert and oriented x3.      NEUROLOGIC: Grossly intact globally with no focal deficits.       Results Review:     I reviewed the patient's new clinical results.    Lab Results (last 24 hours)     Procedure Component Value " Units Date/Time    Urinalysis With / Culture If Indicated [[id number]]  (Abnormal) Collected:  02/08/17 1912    Specimen:  Urine from Urine, Clean Catch Updated:  02/08/17 1915     Color, UA Yellow      Appearance, UA Slightly Cloudy (A)      pH, UA 6.5      Specific Gravity, UA 1.020      Glucose,  mg/dL (2+) (A)      Ketones, UA Negative      Bilirubin, UA Negative      Blood, UA Large (3+) (A)      Protein,  mg/dL (2+) (A)      Leuk Esterase, UA Small (1+) (A)      Nitrite, UA Negative      Urobilinogen, UA 0.2 E.U./dL     Urinalysis, Microscopic Only [[id number]]  (Abnormal) Collected:  02/08/17 1912    Specimen:  Urine from Urine, Clean Catch Updated:  02/08/17 1921     RBC, UA 13-20 (A) /HPF      WBC, UA 31-50 (A) /HPF      Bacteria, UA 1+ (A) /HPF      Squamous Epithelial Cells, UA 0-2 /HPF      Yeast, UA  /HPF      Large/3+ Budding Yeast w/Hyphae     Hyaline Casts, UA None Seen /LPF      Methodology Manual Light Microscopy     POC Glucose Fingerstick [[id number]]  (Abnormal) Collected:  02/08/17 2027    Specimen:  Blood Updated:  02/08/17 2033     Glucose 212 (H) mg/dL     Narrative:       Meter: [id number] : [id number] Nestor Beckford    CBC & Differential [[id number]] Collected:  02/09/17 0559    Specimen:  Blood Updated:  02/09/17 0609    Narrative:       The following orders were created for panel order CBC & Differential.  Procedure                               Abnormality         Status                     ---------                               -----------         ------                     CBC Auto Differential[[id number]]         Abnormal            Final result                 Please view results for these tests on the individual orders.    CBC Auto Differential [[id number]]  (Abnormal) Collected:  02/09/17 0559    Specimen:  Blood Updated:  02/09/17 0609     WBC 14.38 (H) 10*3/mm3      RBC 4.19 (L) 10*6/mm3      Hemoglobin 12.1 (L) g/dL      Hematocrit 39.2 (L) %      MCV 93.6 fL      MCH  28.9 pg      MCHC 30.9 (L) g/dL      RDW 13.9 %      RDW-SD 46.9 fl      MPV 10.7 (H) fL      Platelets 181 10*3/mm3      Neutrophil % 87.0 (H) %      Lymphocyte % 7.0 (L) %      Monocyte % 4.3 %      Eosinophil % 1.1 %      Basophil % 0.2 %      Immature Grans % 0.4 %      Neutrophils, Absolute 12.50 (H) 10*3/mm3      Lymphocytes, Absolute 1.01 10*3/mm3      Monocytes, Absolute 0.62 10*3/mm3      Eosinophils, Absolute 0.16 10*3/mm3      Basophils, Absolute 0.03 10*3/mm3      Immature Grans, Absolute 0.06 (H) 10*3/mm3      nRBC 0.0 /100 WBC     Lactic Acid, Plasma [[id number]]  (Normal) Collected:  02/09/17 0559    Specimen:  Blood Updated:  02/09/17 0620     Lactate 0.8 mmol/L     Comprehensive Metabolic Panel [[id number]]  (Abnormal) Collected:  02/09/17 0559    Specimen:  Blood Updated:  02/09/17 0713     Glucose 48 (C) mg/dL      BUN 17 mg/dL      Creatinine 0.81 mg/dL      Sodium 149 (H) mmol/L      Potassium 3.6 mmol/L      Chloride 111 (H) mmol/L      CO2 30.2 (H) mmol/L      Calcium 8.5 (L) mg/dL      Total Protein 6.2 g/dL      Albumin 2.40 (L) g/dL      ALT (SGPT) 8 U/L      AST (SGOT) 16 U/L      Alkaline Phosphatase 83 U/L      Total Bilirubin 0.5 mg/dL      eGFR Non African Amer 91 mL/min/1.73      Globulin 3.8 gm/dL      A/G Ratio 0.6 g/dL      BUN/Creatinine Ratio 21.0      Anion Gap 7.8 mmol/L     Narrative:       The MDRD GFR formula is only valid for adults with stable renal function between ages 18 and 70.    Urine Culture [[id number]]  (Abnormal) Collected:  02/07/17 1051    Specimen:  Urine from Urine, Clean Catch Updated:  02/09/17 0734     Urine Culture >100,000 CFU/mL Candida albicans (A)     POC Glucose Fingerstick [[id number]]  (Abnormal) Collected:  02/09/17 0722    Specimen:  Blood Updated:  02/09/17 0735     Glucose 45 (C) mg/dL     Narrative:       Meter: [id number] : [id number] Ofelia BAILEY    POC Glucose Fingerstick [[id number]]  (Abnormal) Collected:  02/09/17 0816    Specimen:  Blood  Updated:  02/09/17 0826     Glucose 47 (C) mg/dL     Narrative:       Meter: [id number] : [id number] Mings Teresita PCA    POC Glucose Fingerstick [[id number]]  (Abnormal) Collected:  02/09/17 0851    Specimen:  Blood Updated:  02/09/17 0913     Glucose 51 (L) mg/dL     Narrative:       Meter: [id number] : [id number] Lebron Ashley RN Validator    POC Glucose Fingerstick [[id number]]  (Normal) Collected:  02/09/17 0858    Specimen:  Blood Updated:  02/09/17 0913     Glucose 75 mg/dL     Narrative:       Meter: [id number] : [id number] Lebron Ashley RN Validator    Blood Culture [[id number]]  (Normal) Collected:  02/07/17 1048    Specimen:  Blood from Arm, Left Updated:  02/09/17 1101     Blood Culture No growth at 2 days     Blood Culture [[id number]]  (Normal) Collected:  02/07/17 1108    Specimen:  Blood from Arm, Right Updated:  02/09/17 1201     Blood Culture No growth at 2 days     POC Glucose Fingerstick [[id number]]  (Abnormal) Collected:  02/09/17 1150    Specimen:  Blood Updated:  02/09/17 1215     Glucose 168 (H) mg/dL     Narrative:       Meter: [id number] : [id number] Mings Teresita PCA    Urine Culture [[id number]]  (Normal) Collected:  02/08/17 1912    Specimen:  Urine from Urine, Clean Catch Updated:  02/09/17 1313     Urine Culture Culture in progress     MRSA Screen [[id number]]  (Normal) Collected:  02/07/17 2210    Specimen:  Swab from Nares Updated:  02/09/17 1435     MRSA SCREEN CX        No Methicillin Resistant Staphylococcus aureus Isolated at 24 hours    POC Glucose Fingerstick [[id number]]  (Abnormal) Collected:  02/09/17 1624    Specimen:  Blood Updated:  02/09/17 1638     Glucose 266 (H) mg/dL     Narrative:       Meter: [id number] : [id number] Mings Teresita PCA          Imaging Results (last 24 hours)     ** No results found for the last 24 hours. **          Xray not reviewed personally by physician.      ECG not reviewed personally by physician  ECG/EMG Results (most recent)     Procedure  Component Value Units Date/Time    ECG 12 Lead [[id number]] Collected:  02/07/17 1039     Updated:  02/07/17 1212    Narrative:       RR Interval= 492 ms  MI Interval= 140 ms  QRSD Interval= 94 ms  QT Interval= 324 ms  QTc Interval= 462 ms  Heart Rate= 122 ms  P Axis= 73 deg  QRS Axis= 14 deg  T Wave Axis= 41 deg  I: 40 Axis= -23 deg  T: 40 Axis= 63 deg  ST Axis= 186 deg  SINUS TACHYCARDIA  PROBABLE INFERIOR INFARCT, AGE INDETERMINATE  NO SIGNIFICANT CHANGE FROM PREVIOUS ECG  Electronically Signed by:  Eusebio Bowling ([facility]) 07-Feb-2017 12:07:44  Date and Time of Study: 2017-02-07 10:39:32          Medication Review:   I have reviewed the patient's current medication list    Current Facility-Administered Medications:   •  acetaminophen (TYLENOL) tablet 500 mg, 500 mg, Oral, Q4H PRN, Fabienne R Al, APRN  •  budesonide (PULMICORT) nebulizer solution 0.5 mg, 0.5 mg, Nebulization, BID - RT, Fabienne R Sarepta, APRN, 0.5 mg at 02/09/17 0736  •  dextrose (D50W) solution 25 g, 25 g, Intravenous, Q15 Min PRN, Fabienne R Al, APRN  •  dextrose (GLUTOSE) oral gel 15 g, 15 g, Oral, Q15 Min PRN, Fabienne R Sarepta, APRN  •  enoxaparin (LOVENOX) syringe 40 mg, 40 mg, Subcutaneous, Q24H, Fabienne R Al, APRN, 40 mg at 02/09/17 1725  •  famotidine (PEPCID) tablet 20 mg, 20 mg, Oral, BID, Fabienne R Al, APRN, 20 mg at 02/09/17 1725  •  glucagon (GLUCAGEN) injection 1 mg, 1 mg, Subcutaneous, Q15 Min PRN, Fabienne R Al, APRN  •  guaiFENesin (MUCINEX) 12 hr tablet 1,200 mg, 1,200 mg, Oral, BID, Fabienne R Sarepta, APRN, 1,200 mg at 02/09/17 1725  •  insulin aspart (novoLOG) injection 0-7 Units, 0-7 Units, Subcutaneous, 4x Daily AC & at Bedtime, MAURICE Smith, 4 Units at 02/09/17 1718  •  insulin detemir (LEVEMIR) injection 30 Units, 30 Units, Subcutaneous, Nightly, MAURICE Smith, 30 Units at 02/08/17 0879  •  ipratropium-albuterol (DUO-NEB) nebulizer solution 3 mL, 3 mL, Nebulization, Q4H While Awake - RT,  Angela Wiley MD, 3 mL at 02/09/17 1514  •  lactated ringers infusion, 100 mL/hr, Intravenous, Continuous, MAURICE Smith, Last Rate: 100 mL/hr at 02/09/17 1545, 100 mL/hr at 02/09/17 1545  •  lactobacillus acidophilus (RISAQUAD) capsule 1 capsule, 1 capsule, Oral, BID, Fabienne Melendez APRN, 1 capsule at 02/09/17 1718  •  [START ON 2/10/2017] levoFLOXacin (LEVAQUIN) 250 mg/50 mL D5W (premix) 250 mg, 250 mg, Intravenous, Q24H, Angela Wiley MD  •  miconazole (MICOTIN) 2 % powder, , Topical, Q12H, Shailesh Worthy MD  •  Pharmacy Consult - Pharmacy to dose, , Does not apply, Continuous PRN, Angela Wiley MD  •  Pharmacy to Dose enoxaparin (LOVENOX), , Does not apply, Continuous PRN, Fabienne Melendez, APRN  •  sodium chloride 0.9 % flush 10 mL, 10 mL, Intravenous, PRN, Mason Rizzo MD  •  vancomycin (VANCOCIN) 1,250 mg in sodium chloride 0.9 % 250 mL IVPB, 20 mg/kg, Intravenous, Once, Mason Rizzo MD, 1,250 mg at 02/07/17 1821      Assessment/Plan       ASSESSMENT AND PLAN:       SUMMARY:        PROPHYLAXIS:   -DVT Prophylaxis: On Inj. Lovenox   -Perry Catheter: Present     NUTRITION AND FLUIDS:  -Diet/ Nutrition: Pureed, Nectar, Cardiac, consistent carbohydrate diet   -Fluid Status/Electrolytes: Lactated Ringer 100 mL/Hr      SOCIAL ISSUES:    -Behavioral/ Agitation Issues: NONE   -Social Issues: Lives at home with his family.       THERAPEUTIC:    -ANTIBIOTICS: Inj Vanco and Inj Levaquin  -PAIN MANAGEMENT: N/A                    PRIMARY DIAGNOSES:     1. Acute hypoxic respiratory failure:   Recent discharge 1/26/17 with RLL PNA and MRSA with sepsis  Previously treated with Zyvox and Levaquin (ended approx 5 days ago)  Add duonebs/guaifenesin/IS/acapella/budesonide nebs  SLP eval pending  Received levaquin and vancomycin x 1 dose in ER  Check resp viral/procal/sputum culture/mrsa nares  Resp viral cultures negative; MRSA screen awaited. Urine culture in  progress      2. Leukocytosis: possibly reactive, afebrile, monitor   As per number 1, received dose of IV abx      3. Indeterminate troponin: likely secondary to FREDY  Continue hydration, repeat troponin, monitor on telemetry      4. Acute kidney injury:   H/O urinary retention and BPH-now with fuller cath in place  Hold bumex  Continue IV hydration as per number 5      5. Hypernatremia:   Bolus 250 ml LR now  change IVFs to LR from NS at 100 ml/hr      6. Hypertension: low goal off meds, on IVFs, monitor      7. DM2:   Glucose elevated  check A1C  Continue partial dose home levemir at 30 units nightly (home dose 44 units)  Add SSI and accuchecks ac/hs  Hold januvia      8. H/O CHF: no acute issues monitor on IVFs  Sinus tachy on EKG, no significant change from previous per Dr. Bowling      9. CAD/hyperlipidemia: nothing acute currently, hold statin/imdur for now      10. GERD: famotidine bid      11. H/O DVT/TIA: no acute issues, previously on therapeutic lovenox dosing      12. H/O DEYSI: no acute issues, monitor      DVT prophy: noted previously on therapeutic dose lovenox, unclear why.  Will place on pharmacy dosing lovenox/scd's/pepcid         SECONDARY DIAGNOSES:         As above     SURGICAL DIAGNOSES:        No surgical hx available         PLAN:    -Labs and diagnostic tests reviewed: Gluc 266, Was in 40s in AM, Na 149, K 3.6, CO2 30,2, Creat 0.81, WBC 14.38, Hb 12.1, Plat 181, 87.0N, 7.0L, venous lactate 0.8     -Diagnostic tests reviewed: No tests done in 24 hours    -Consultations requested: Urology consultation requested for indwelling Fuller and pus    -Any new recommendations: N/A    -New Labs ordered: CBC, CMP, Troponin and Lactic acid in AM    -New diagnostic tests ordered: N/A    -Any changes in medications: N/A    -Discharge planning issues: Patient should be able to go back home once ready for discharge    -Placement issues: N/A    -Patient is clinically and hemodynamically stable    -To continue  current management and supportive care    -Will follow patient closely    -Nothing new to add for right now      Plan for disposition:Patient will be able to go back to NH when ready for discharge    Shailesh Worthy MD  02/09/17  7:14 PM            Shailesh Worthy M.D., [facility]  Internal Medicine/ Hospitalist          Time:       EMR Dragon/Transcription disclaimer:      Much of this encounter note is an electronic transcription/translation of spoken language to printed text. The electronic translation of spoken language may permit erroneous, or at times, nonsensical words or phrases to be inadvertently transcribed; Although I have reviewed the note for such errors, some may still exist.

## 2017-02-12 LAB
BACTERIA SPEC AEROBE CULT: NORMAL
BACTERIA SPEC AEROBE CULT: NORMAL

## 2017-02-14 LAB
BACTERIA SPEC AEROBE CULT: ABNORMAL
GRAM STN SPEC: ABNORMAL